# Patient Record
Sex: FEMALE | Race: WHITE | NOT HISPANIC OR LATINO | Employment: OTHER | URBAN - METROPOLITAN AREA
[De-identification: names, ages, dates, MRNs, and addresses within clinical notes are randomized per-mention and may not be internally consistent; named-entity substitution may affect disease eponyms.]

---

## 2017-01-25 ENCOUNTER — HOSPITAL ENCOUNTER (OUTPATIENT)
Dept: RADIOLOGY | Facility: MEDICAL CENTER | Age: 82
End: 2017-01-25
Attending: INTERNAL MEDICINE
Payer: MEDICARE

## 2017-01-25 DIAGNOSIS — I15.9 SECONDARY HYPERTENSION: ICD-10-CM

## 2017-01-25 DIAGNOSIS — I48.92 ATRIAL FLUTTER BY ELECTROCARDIOGRAM (HCC): ICD-10-CM

## 2017-01-25 DIAGNOSIS — E78.5 HYPERLIPIDEMIA, UNSPECIFIED HYPERLIPIDEMIA TYPE: ICD-10-CM

## 2017-02-01 ENCOUNTER — OFFICE VISIT (OUTPATIENT)
Dept: MEDICAL GROUP | Facility: MEDICAL CENTER | Age: 82
End: 2017-02-01
Payer: MEDICARE

## 2017-02-01 VITALS
WEIGHT: 123 LBS | DIASTOLIC BLOOD PRESSURE: 80 MMHG | SYSTOLIC BLOOD PRESSURE: 146 MMHG | HEART RATE: 59 BPM | BODY MASS INDEX: 24.8 KG/M2 | TEMPERATURE: 98.1 F | RESPIRATION RATE: 16 BRPM | OXYGEN SATURATION: 96 % | HEIGHT: 59 IN

## 2017-02-01 DIAGNOSIS — E11.9 CONTROLLED TYPE 2 DIABETES MELLITUS WITHOUT COMPLICATION, UNSPECIFIED LONG TERM INSULIN USE STATUS: ICD-10-CM

## 2017-02-01 DIAGNOSIS — N18.30 CKD (CHRONIC KIDNEY DISEASE) STAGE 3, GFR 30-59 ML/MIN (HCC): ICD-10-CM

## 2017-02-01 DIAGNOSIS — I10 HTN (HYPERTENSION), BENIGN: ICD-10-CM

## 2017-02-01 DIAGNOSIS — I50.22 CHRONIC SYSTOLIC CONGESTIVE HEART FAILURE (HCC): ICD-10-CM

## 2017-02-01 DIAGNOSIS — I48.0 PAROXYSMAL ATRIAL FIBRILLATION (HCC): ICD-10-CM

## 2017-02-01 DIAGNOSIS — E78.5 HYPERLIPIDEMIA, UNSPECIFIED HYPERLIPIDEMIA TYPE: ICD-10-CM

## 2017-02-01 LAB
HBA1C MFR BLD: 7 % (ref ?–5.8)
INT CON NEG: NORMAL
INT CON POS: NORMAL

## 2017-02-01 PROCEDURE — 1101F PT FALLS ASSESS-DOCD LE1/YR: CPT | Performed by: FAMILY MEDICINE

## 2017-02-01 PROCEDURE — 99214 OFFICE O/P EST MOD 30 MIN: CPT | Performed by: FAMILY MEDICINE

## 2017-02-01 PROCEDURE — G8484 FLU IMMUNIZE NO ADMIN: HCPCS | Performed by: FAMILY MEDICINE

## 2017-02-01 PROCEDURE — 4040F PNEUMOC VAC/ADMIN/RCVD: CPT | Performed by: FAMILY MEDICINE

## 2017-02-01 PROCEDURE — G8420 CALC BMI NORM PARAMETERS: HCPCS | Performed by: FAMILY MEDICINE

## 2017-02-01 PROCEDURE — 1036F TOBACCO NON-USER: CPT | Performed by: FAMILY MEDICINE

## 2017-02-01 PROCEDURE — G8432 DEP SCR NOT DOC, RNG: HCPCS | Performed by: FAMILY MEDICINE

## 2017-02-01 PROCEDURE — 83036 HEMOGLOBIN GLYCOSYLATED A1C: CPT | Performed by: FAMILY MEDICINE

## 2017-02-01 PROCEDURE — G8598 ASA/ANTIPLAT THER USED: HCPCS | Performed by: FAMILY MEDICINE

## 2017-02-01 ASSESSMENT — PATIENT HEALTH QUESTIONNAIRE - PHQ9: CLINICAL INTERPRETATION OF PHQ2 SCORE: 0

## 2017-02-01 NOTE — PROGRESS NOTES
CC: Regular follow up .    HPI:   Ana presents today for regular follow up.    Type 2 diabetes mellitus , has not been adequately controlled. A1C is 7.0. Has been asymptomatic.She is currently on Lantus 10 units in PM, and Novolog 6 units 3 times daily, patient has been having multiple hypoglycemic episodes in the past 3 month.    Hypertension, BP has been adequately controlled on current medication. Denies headache, chest pain, and SOB.She ius on Amlodipine 5 mg, carvedilol 25 mg bid. No side effects    Chronic kidney disease) stage 3, her last eGFR was 38. She has been asymptomatic.Advised to void nephrotoxic medication. Will continue watch kidney functions.    Chronic systolic congestive heart failure, her last echo was done recently showed EF of 35 %.Currently denies SOB, her leg swelling has improved a lot.  Currently she is on Lasix from 20 mg daily, and spironolactone 25 mg daily. Disucced with the patient that she might need evaluation for possible AICD if not responding to medical treatment. She follows up with cardiology Dr Simmons.    Paroxysmal atrial fibrillation , has been stable, and asymptomatic.has controlled rate and rhythm. Has been on BB, and on Eliquis     Hyperlipidemia, she has been tolerating the statin. Denies muscle pain LFTs has been normal, she is on Lipitor 80 mg daily.        Patient Active Problem List    Diagnosis Date Noted   • Physical deconditioning 03/24/2016     Priority: High   • Acute kidney injury (CMS-HCC) 03/24/2016     Priority: High   • Generalized weakness 03/19/2016     Priority: High   • Nephritis and nephropathy, not specified as acute or chronic, with other specified pathological lesion in kidney, in diseases classified elsewhere 09/08/2014     Priority: High   • Acute renal failure superimposed on stage 3 chronic kidney disease (CMS-HCC) 03/19/2016     Priority: Medium   • Hyponatremia 03/18/2016     Priority: Medium   • Aortic atherosclerosis (CMS-[facility])  04/28/2015     Priority: Medium   • CKD (chronic kidney disease) stage 3, GFR 30-59 ml/min 10/07/2014     Priority: Medium   • Diabetes mellitus (CMS-HCC) 09/08/2014     Priority: Medium   • Peripheral neuropathy (CMS-HCC) 09/10/2009     Priority: Medium   • HTN (hypertension), benign      Priority: Medium   • CHF (congestive heart failure) (CMS-HCC)      Priority: Medium   • DJD (degenerative joint disease)      Priority: Medium   • Hyperlipidemia      Priority: Low   • CAD (coronary artery disease)      Priority: Low   • HTN (hypertension) 11/23/2016   • DM type 2 (diabetes mellitus, type 2) (CMS-HCC) 11/23/2016   • Syncope 11/22/2016   • Acute systolic congestive heart failure, NYHA class 2 (CMS-HCC) 11/22/2016   • 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   • Aspiration into airway 07/18/2016   • Acute respiratory failure (CMS-HCC) 07/18/2016   • Lower urinary tract infectious disease 07/17/2016   • PAF (paroxysmal atrial fibrillation)-probably reason for Eliquis 07/17/2016   • Leukocytosis 07/16/2016   • Presbyesophagus 07/16/2016   • Pneumonia 07/15/2016   • Hypoglycemia associated with diabetes (CMS-HCC) 07/15/2016   • Medical home 03/25/2016   • Type 2 diabetes mellitus (CMS-HCC) 03/19/2016   • Encounter for long-term (current) use of insulin (CMS-HCC) 09/08/2014   • Renal failure 01/09/2013   • Diabetes    • Cystocele        Current Outpatient Prescriptions   Medication Sig Dispense Refill   • apixaban (ELIQUIS) 5mg Tab Take 1 Tab by mouth 2 Times a Day. 60 Tab 11   • carvedilol (COREG) 25 MG Tab Take 1 Tab by mouth 2 times a day, with meals. 60 Tab 2   • atorvastatin (LIPITOR) 80 MG tablet Take 1 Tab by mouth every morning. 90 Tab 1   • amlodipine (NORVASC) 5 MG Tab Take 1 Tab by mouth every day. 90 Tab 1   • spironolactone (ALDACTONE) 25 MG Tab Take 1 Tab by mouth every day. 30 Tab 3   • multivitamin (THERAGRAN) Tab Take 1 Tab by mouth every day. 30 Tab 1   • furosemide (LASIX) 20 MG Tab Take 1 Tab by mouth 2 times a  "day. 60 Tab 3   • insulin glargine (LANTUS) 100 UNIT/ML Solution Inject 20 Units as instructed every evening.     • insulin aspart (NOVOLOG) 100 UNIT/ML Solution Inject 5 Units as instructed 3 times a day before meals.     • docusate sodium (COLACE) 100 MG Cap Take 100 mg by mouth 2 times a day as needed for Constipation. Take 1 capsule by mouth twice daily as needed for constipation for up to 15 days.       No current facility-administered medications for this visit.         Allergies as of 02/01/2017 - Kirill as Reviewed 02/01/2017   Allergen Reaction Noted   • Tetracaine hcl [tetcaine]  10/29/2014   • Vancomycin  10/29/2014   • Vigamox [moxifloxacin]  10/29/2014        ROS: Denies any chest pain, Shortness of breath, Changes bowel or bladder, Lower extremity edema.    Physical Exam:  /80 mmHg  Pulse 59  Temp(Src) 36.7 °C (98.1 °F)  Resp 16  Ht 1.499 m (4' 11.02\")  Wt 55.792 kg (123 lb)  BMI 24.83 kg/m2  SpO2 96%  Gen.: Well-developed, well-nourished, no apparent distress,pleasant and cooperative with the examination  Skin:  Warm and dry with good turgor. No rashes or suspicious lesions in visible areas  HEENT:Sinuses nontender with palpation, TMs clear, nares patent with pink mucosa and clear rhinorrhea,no septal deviation ,polyps or lesions. lips without lesions, oropharynx clear.  Neck: Trachea midline,no masses or adenopathy. No JVD.  Cor: Regular rate and rhythm without murmur, gallop or rub.  Lungs: Respirations unlabored.Clear to auscultation with equal breath sounds bilaterally. No wheezes, rhonchi.  Extremities: No cyanosis, no clubbing , bilateral leg edema.          Assessment and Plan.   81 y.o. female     1.Type 2 diabetes mellitus without complication  Adequately controlled. A1C is 7.  Continue on Lantus 10 units in PM, patient has h/o multiple hypoglycemic episodes so will stop Novolog for now. Patient advised to check BS 3 times daily for a month, and RTC in 1 month for " reevaluation.    - POCT A1C    3. HTN (hypertension), benign  Has been adequately controlled on current medication. Denies headache, chest pain, and SOB.  Continue on Amlodipine 5 mg, carvedilol 25 mg bid    4. CKD (chronic kidney disease) stage 3, GFR 30-59 ml/min  Last eGFR was 38. Asymptomatic.  Could be because of her CHF.  Will continue watch.    5. Chronic systolic congestive heart failure  Last echo was done recently showed EF of 35 %.  Currently she is on medical treatment.  Continue Lasix from 20 mg daily, and spironolactone 25 mg daily  Needs evaluation for possible AICD if not responding to medical treatment.  RTC in a month for reevaluation.  She follows up with cardiology Dr Simmons.    5. Paroxysmal atrial fibrillation (CMS-HCC)  Stable, asymptomatic.Controlled rate and rhythm.   Continue  on Eliquis     6. Hyperlipidemia, unspecified hyperlipidemia type  He has been tolerating the statin. Denies muscle pain LFTs has been normal  Continue on Lipitor 80 mg daily.

## 2017-02-01 NOTE — MR AVS SNAPSHOT
"        Ana Uriostegui Melissa   2017 11:20 AM   Office Visit   MRN: [id number]    Department:  [address] [place] [address] Group   Dept Phone:  [phone]    Description:  Female : 1935   Provider:  Rich Coffey M.D.           Reason for Visit     Follow-Up routine f/u       Allergies as of 2017     Allergen Noted Reactions    Tetracaine Hcl [Tetcaine] 10/29/2014       Vancomycin 10/29/2014       Vigamox [Moxifloxacin] 10/29/2014         You were diagnosed with     Controlled type 2 diabetes mellitus without complication, unspecified long term insulin use status (CMS-HCC)   [6461497]       HTN (hypertension), benign   [606194]       Hyperlipidemia, unspecified hyperlipidemia type   [6793445]       CKD (chronic kidney disease) stage 3, GFR 30-59 ml/min   [379191]       Chronic systolic congestive heart failure (CMS-HCC)   [372398]       Paroxysmal atrial fibrillation (CMS-HCC)   [514905]         Vital Signs     Blood Pressure Pulse Temperature Respirations Height Weight    146/80 mmHg 59 36.7 °C (98.1 °F) 16 1.499 m (4' 11.02\") 55.792 kg (123 lb)    Body Mass Index Oxygen Saturation Smoking Status             24.83 kg/m2 96% Never Smoker          Basic Information     Date Of Birth Sex Race Ethnicity Preferred Language    1935 Female White Non- English      Your appointments     2017  3:15 PM   ECHO with ECHO [facility] EXAM 10   ECHOCARDIOLOGY [facility] ([facility])    [address] [facility] [address]   [phone]           No prep            2017 10:15 AM   NM CARDIAC STRESS TEST (30) with [facility] NM DSPECT 2   [facility] ([facility])    [address] [facility] [id number] [phone]           NPO for 4 hours prior to scan.  No caffeine for 24 hours prior to test (decaf, coffee, cola, tea, chocolate, Excedrin, and Anacin).  No Viagra or other ED meds for 48 hours.  Warn patient of length of test and to bring list of meds.      "    Feb 28, 2017  1:40 PM   Established Patient with Rich Coffey M.D.   [facility] [address]    [address]  [facility] [address]  [place] [address] [phone]           You will be receiving a confirmation call a few days before your appointment from our automated call confirmation system.              Problem List              ICD-10-CM Priority Class Noted - Resolved    HTN (hypertension), benign I10 Medium  Unknown - Present    Diabetes    Unknown - Present    Hyperlipidemia E78.5 Low  Unknown - Present    CAD (coronary artery disease) I25.10 Low  Unknown - Present    CHF (congestive heart failure) (CMS-HCC) I50.9 Medium  Unknown - Present    DJD (degenerative joint disease) M19.90 Medium  Unknown - Present    Cystocele N81.10   Unknown - Present    Peripheral neuropathy (CMS-HCC) G62.9 Medium  9/10/2009 - Present    Renal failure N19   1/9/2013 - Present    Diabetes mellitus (CMS-HCC) E11.9 Medium  9/8/2014 - Present    Encounter for long-term (current) use of insulin (CMS-HCC) Z79.4   9/8/2014 - Present    Nephritis and nephropathy, not specified as acute or chronic, with other specified pathological lesion in kidney, in diseases classified elsewhere N05.8 High  9/8/2014 - Present    CKD (chronic kidney disease) stage 3, GFR 30-59 ml/min N18.3 Medium  10/7/2014 - Present    Aortic atherosclerosis (CMS-HCC) I70.0 Medium  4/28/2015 - Present    Hyponatremia E87.1 Medium  3/18/2016 - Present    Generalized weakness R53.1 High  3/19/2016 - Present    Acute renal failure superimposed on stage 3 chronic kidney disease (CMS-HCC) N17.9, N18.3 Medium  3/19/2016 - Present    Type 2 diabetes mellitus (CMS-HCC) E11.9   3/19/2016 - Present    Physical deconditioning R53.81 High  3/24/2016 - Present    Acute kidney injury (CMS-HCC) N17.9 High  3/24/2016 - Present    Medical home Z78.9   3/25/2016 - Present    Pneumonia J18.9   7/15/2016 - Present    Hypoglycemia associated with diabetes  (CMS-HCC) E11.649   7/15/2016 - Present    Leukocytosis D72.829   7/16/2016 - Present    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   7/16/2016 - Present    Lower urinary tract infectious disease N39.0   7/17/2016 - Present    PAF (paroxysmal atrial fibrillation)-probably reason for Eliquis I48.0   7/17/2016 - Present    Aspiration into airway T17.908A   7/18/2016 - Present    Acute respiratory failure (CMS-HCC) J96.00   7/18/2016 - Present    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   7/19/2016 - Present    Syncope R55   11/22/2016 - Present    Acute systolic congestive heart failure, NYHA class 2 (CMS-HCC) I50.21   11/22/2016 - Present    HTN (hypertension) I10   11/23/2016 - Present    DM type 2 (diabetes mellitus, type 2) (CMS-HCC) E11.9   11/23/2016 - Present      Health Maintenance        Date Due Completion Dates    IMM DTaP/Tdap/Td Vaccine (1 - Tdap) 6/22/1956 ---    PAP SMEAR 6/22/1958 ---    COLONOSCOPY 6/22/1987 ---    IMM ZOSTER VACCINE 6/22/1997 ---    BONE DENSITY 1/23/2013 1/23/2008, 1/23/2008    URINE ACR / MICROALBUMIN 11/5/2015 11/5/2014, 7/1/2010    MAMMOGRAM 2/6/2016 2/6/2015, 2/3/2015, 2/3/2015, 2/3/2015, 9/21/2010, 9/24/2008, 8/30/2006, 8/24/2005, 8/23/2004    RETINAL SCREENING 3/17/2016 3/17/2015 (Done), 4/10/2014    Override on 3/17/2015: Done    IMM INFLUENZA (1) 9/1/2016 10/1/2015, 10/3/2014    DIABETES MONOFILAMENT / LE EXAM 9/1/2016 9/1/2015 (Done), 3/2/2015 (N/S), 3/2/2015 (Done), 3/2/2015 (N/S), 3/2/2015 (N/S), 3/2/2015 (Done), 9/8/2014    Override on 9/1/2015: Done    Override on 3/2/2015: (N/S)    Override on 3/2/2015: Done    Override on 3/2/2015: (N/S)    Override on 3/2/2015: (N/S)    Override on 3/2/2015: Done    A1C SCREENING 10/19/2016 4/19/2016, 2/3/2016, 8/3/2015, 3/2/2015, 10/29/2014, 9/8/2014, 1/29/2014    FASTING LIPID PROFILE 11/24/2017 11/24/2016, 8/10/2015, 11/5/2014, 6/2/2014, 4/4/2014, 7/1/2010    SERUM CREATININE 12/9/2017 12/9/2016, 11/26/2016, 11/25/2016, 11/24/2016, 11/22/2016, 7/20/2016, 7/18/2016,  7/17/2016, 7/16/2016, 7/15/2016, 5/16/2016, 4/25/2016, 4/18/2016, 4/14/2016, 4/11/2016, 4/9/2016, 4/5/2016, 4/4/2016, 4/3/2016, 4/2/2016, 4/1/2016, 3/31/2016, 3/30/2016, 3/29/2016, 3/25/2016, 3/24/2016, 3/23/2016, 3/22/2016, 3/21/2016, 3/20/2016, 3/19/2016, 3/18/2016, 3/17/2016, 2/22/2016, 8/10/2015, 2/17/2015, 2/17/2015, 12/15/2014, 11/19/2014, 11/5/2014, 10/11/2014, 10/10/2014, 10/9/2014, 10/8/2014, 10/7/2014, 10/6/2014, 4/29/2014, 4/4/2014, 1/29/2014, 7/1/2010            Results     POCT Hemoglobin A1C      Component    Glycohemoglobin    7.0    Internal Control Negative    Internal Control Positive                        Current Immunizations     13-VALENT PCV PREVNAR 9/16/2015    Influenza TIV (IM) 10/1/2015, 10/3/2014    Pneumococcal polysaccharide vaccine (PPSV-23) 10/29/2014, 9/6/2009    Tuberculin Skin Test 6/26/2016, 4/19/2016 10:57 AM      Below and/or attached are the medications your provider expects you to take. Review all of your home medications and newly ordered medications with your provider and/or pharmacist. Follow medication instructions as directed by your provider and/or pharmacist. Please keep your medication list with you and share with your provider. Update the information when medications are discontinued, doses are changed, or new medications (including over-the-counter products) are added; and carry medication information at all times in the event of emergency situations     Allergies:  TETRACAINE HCL - (reactions not documented)     VANCOMYCIN - (reactions not documented)     VIGAMOX - (reactions not documented)               Medications  Valid as of: February 01, 2017 - 11:55 AM    Generic Name Brand Name Tablet Size Instructions for use    AmLODIPine Besylate (Tab) NORVASC 5 MG Take 1 Tab by mouth every day.        Apixaban (Tab) ELIQUIS 5mg Take 1 Tab by mouth 2 Times a Day.        Atorvastatin Calcium (Tab) LIPITOR 80 MG Take 1 Tab by mouth every morning.        Carvedilol (Tab)  COREG 25 MG Take 1 Tab by mouth 2 times a day, with meals.        Docusate Sodium (Cap) COLACE 100 MG Take 100 mg by mouth 2 times a day as needed for Constipation. Take 1 capsule by mouth twice daily as needed for constipation for up to 15 days.        Furosemide (Tab) LASIX 20 MG Take 1 Tab by mouth 2 times a day.        Insulin Aspart (Solution) NOVOLOG 100 UNIT/ML Inject 5 Units as instructed 3 times a day before meals.        Insulin Glargine (Solution) LANTUS 100 UNIT/ML Inject 20 Units as instructed every evening.        Multiple Vitamin (Tab) THERAGRAN  Take 1 Tab by mouth every day.        Spironolactone (Tab) ALDACTONE 25 MG Take 1 Tab by mouth every day.        .                 Medicines prescribed today were sent to:     [facility] - [place], [place] - [address]    [address] [facility] [address]    Phone: [phone] Fax: [phone]    Open 24 Hours?: No    [facility], [place] - [address] [address]    Phone: [phone] Fax: [phone]    Open 24 Hours?: No    [facility] [address] [place], [place] - [address] [facility]    [address] [facility] [address]    Phone: [phone] Fax: [phone]    Open 24 Hours?: No      Medication refill instructions:       If your prescription bottle indicates you have medication refills left, it is not necessary to call your provider’s office. Please contact your pharmacy and they will refill your medication.    If your prescription bottle indicates you do not have any refills left, you may request refills at any time through one of the following ways: The online [employer] system (except Urgent Care), by calling your provider’s office, or by asking your pharmacy to contact your provider’s office with a refill request. Medication refills are processed only during regular business hours and may not be available until the next business day. Your provider may request additional  information or to have a follow-up visit with you prior to refilling your medication.   *Please Note: Medication refills are assigned a new Rx number when refilled electronically. Your pharmacy may indicate that no refills were authorized even though a new prescription for the same medication is available at the pharmacy. Please request the medicine by name with the pharmacy before contacting your provider for a refill.        Other Notes About Your Plan     Patient is enrolled in [facility] with Dr. Coffey.    8/3/16--Chart reviewed for case management intervention.  Not appropriate for chronic care management at this time.           MyChart Status: Patient Declined

## 2017-02-06 DIAGNOSIS — I10 ESSENTIAL HYPERTENSION: ICD-10-CM

## 2017-02-06 RX ORDER — AMLODIPINE BESYLATE 5 MG/1
5 TABLET ORAL DAILY
Qty: 90 TAB | Refills: 1 | Status: SHIPPED | OUTPATIENT
Start: 2017-02-06 | End: 2017-02-07 | Stop reason: SDUPTHER

## 2017-02-07 DIAGNOSIS — I10 ESSENTIAL HYPERTENSION: ICD-10-CM

## 2017-02-07 RX ORDER — AMLODIPINE BESYLATE 5 MG/1
5 TABLET ORAL DAILY
Qty: 90 TAB | Refills: 1 | Status: SHIPPED | OUTPATIENT
Start: 2017-02-07 | End: 2017-07-25

## 2017-02-08 ENCOUNTER — HOSPITAL ENCOUNTER (OUTPATIENT)
Dept: CARDIOLOGY | Facility: MEDICAL CENTER | Age: 82
End: 2017-02-08
Attending: FAMILY MEDICINE
Payer: MEDICARE

## 2017-02-08 ENCOUNTER — HOSPITAL ENCOUNTER (OUTPATIENT)
Dept: LAB | Facility: MEDICAL CENTER | Age: 82
End: 2017-02-08
Attending: INTERNAL MEDICINE
Payer: MEDICARE

## 2017-02-08 DIAGNOSIS — I48.92 ATRIAL FLUTTER BY ELECTROCARDIOGRAM (HCC): ICD-10-CM

## 2017-02-08 DIAGNOSIS — E78.5 HYPERLIPIDEMIA, UNSPECIFIED HYPERLIPIDEMIA TYPE: ICD-10-CM

## 2017-02-08 DIAGNOSIS — I15.9 SECONDARY HYPERTENSION: ICD-10-CM

## 2017-02-08 LAB
ANION GAP SERPL CALC-SCNC: 9 MMOL/L (ref 0–11.9)
BASOPHILS # BLD AUTO: 0.04 K/UL (ref 0–0.12)
BASOPHILS NFR BLD AUTO: 0.6 % (ref 0–1.8)
BNP SERPL-MCNC: 248 PG/ML (ref 0–100)
BUN SERPL-MCNC: 61 MG/DL (ref 8–22)
CALCIUM SERPL-MCNC: 10.9 MG/DL (ref 8.5–10.5)
CHLORIDE SERPL-SCNC: 100 MMOL/L (ref 96–112)
CHOLEST SERPL-MCNC: 125 MG/DL (ref 100–199)
CO2 SERPL-SCNC: 29 MMOL/L (ref 20–33)
CREAT SERPL-MCNC: 1.78 MG/DL (ref 0.5–1.4)
EOSINOPHIL # BLD: 0.13 K/UL (ref 0–0.51)
EOSINOPHIL NFR BLD AUTO: 1.9 % (ref 0–6.9)
ERYTHROCYTE [DISTWIDTH] IN BLOOD BY AUTOMATED COUNT: 48 FL (ref 35.9–50)
GLUCOSE SERPL-MCNC: 133 MG/DL (ref 65–99)
HCT VFR BLD AUTO: 40.2 % (ref 37–47)
HDLC SERPL-MCNC: 48 MG/DL
HGB BLD-MCNC: 12.9 G/DL (ref 12–16)
IMM GRANULOCYTES # BLD AUTO: 0.02 K/UL (ref 0–0.11)
IMM GRANULOCYTES NFR BLD AUTO: 0.3 % (ref 0–0.9)
LDLC SERPL CALC-MCNC: 57 MG/DL
LYMPHOCYTES # BLD: 1.15 K/UL (ref 1–4.8)
LYMPHOCYTES NFR BLD AUTO: 17.2 % (ref 22–41)
MCH RBC QN AUTO: 31.5 PG (ref 27–33)
MCHC RBC AUTO-ENTMCNC: 32.1 G/DL (ref 33.6–35)
MCV RBC AUTO: 98.3 FL (ref 81.4–97.8)
MONOCYTES # BLD: 0.61 K/UL (ref 0–0.85)
MONOCYTES NFR BLD AUTO: 9.1 % (ref 0–13.4)
NEUTROPHILS # BLD: 4.75 K/UL (ref 2–7.15)
NEUTROPHILS NFR BLD AUTO: 70.9 % (ref 44–72)
NRBC # BLD AUTO: 0 K/UL
NRBC BLD-RTO: 0 /100 WBC
PLATELET # BLD AUTO: 209 K/UL (ref 164–446)
PMV BLD AUTO: 12.2 FL (ref 9–12.9)
POTASSIUM SERPL-SCNC: 4 MMOL/L (ref 3.6–5.5)
RBC # BLD AUTO: 4.09 M/UL (ref 4.2–5.4)
SODIUM SERPL-SCNC: 138 MMOL/L (ref 135–145)
TRIGL SERPL-MCNC: 101 MG/DL (ref 0–149)
WBC # BLD AUTO: 6.7 K/UL (ref 4.8–10.8)

## 2017-02-08 PROCEDURE — 93306 TTE W/DOPPLER COMPLETE: CPT

## 2017-02-10 LAB
LV EJECT FRACT MOD 2C 99903: 46.52
LV EJECT FRACT MOD 4C 99902: 44.01
LV EJECT FRACT MOD BP 99901: 47.4

## 2017-02-28 ENCOUNTER — APPOINTMENT (OUTPATIENT)
Dept: MEDICAL GROUP | Facility: MEDICAL CENTER | Age: 82
End: 2017-02-28
Payer: MEDICARE

## 2017-03-20 ENCOUNTER — OFFICE VISIT (OUTPATIENT)
Dept: MEDICAL GROUP | Facility: MEDICAL CENTER | Age: 82
End: 2017-03-20
Payer: MEDICARE

## 2017-03-20 VITALS
OXYGEN SATURATION: 92 % | DIASTOLIC BLOOD PRESSURE: 60 MMHG | BODY MASS INDEX: 24.8 KG/M2 | HEIGHT: 59 IN | SYSTOLIC BLOOD PRESSURE: 120 MMHG | HEART RATE: 64 BPM | WEIGHT: 123 LBS | TEMPERATURE: 98.2 F

## 2017-03-20 DIAGNOSIS — I10 HTN (HYPERTENSION), BENIGN: ICD-10-CM

## 2017-03-20 DIAGNOSIS — Z79.4 TYPE 2 DIABETES MELLITUS WITHOUT COMPLICATION, WITH LONG-TERM CURRENT USE OF INSULIN (HCC): ICD-10-CM

## 2017-03-20 DIAGNOSIS — E78.5 HYPERLIPIDEMIA, UNSPECIFIED HYPERLIPIDEMIA TYPE: ICD-10-CM

## 2017-03-20 DIAGNOSIS — I48.0 PAF (PAROXYSMAL ATRIAL FIBRILLATION) (HCC): ICD-10-CM

## 2017-03-20 DIAGNOSIS — I50.22 CHRONIC SYSTOLIC CONGESTIVE HEART FAILURE (HCC): ICD-10-CM

## 2017-03-20 DIAGNOSIS — N18.30 CKD (CHRONIC KIDNEY DISEASE) STAGE 3, GFR 30-59 ML/MIN (HCC): ICD-10-CM

## 2017-03-20 DIAGNOSIS — E11.9 TYPE 2 DIABETES MELLITUS WITHOUT COMPLICATION, WITH LONG-TERM CURRENT USE OF INSULIN (HCC): ICD-10-CM

## 2017-03-20 PROCEDURE — 99215 OFFICE O/P EST HI 40 MIN: CPT | Mod: 25 | Performed by: NURSE PRACTITIONER

## 2017-03-20 PROCEDURE — 1101F PT FALLS ASSESS-DOCD LE1/YR: CPT | Performed by: NURSE PRACTITIONER

## 2017-03-20 PROCEDURE — G8598 ASA/ANTIPLAT THER USED: HCPCS | Performed by: NURSE PRACTITIONER

## 2017-03-20 PROCEDURE — 4040F PNEUMOC VAC/ADMIN/RCVD: CPT | Performed by: NURSE PRACTITIONER

## 2017-03-20 PROCEDURE — G8420 CALC BMI NORM PARAMETERS: HCPCS | Performed by: NURSE PRACTITIONER

## 2017-03-20 PROCEDURE — 1036F TOBACCO NON-USER: CPT | Performed by: NURSE PRACTITIONER

## 2017-03-20 PROCEDURE — G8484 FLU IMMUNIZE NO ADMIN: HCPCS | Performed by: NURSE PRACTITIONER

## 2017-03-20 RX ORDER — FUROSEMIDE 40 MG/1
60 TABLET ORAL DAILY
Qty: 30 TAB | Status: SHIPPED | DISCHARGE
Start: 2017-03-20 | End: 2020-09-10

## 2017-03-20 NOTE — PROGRESS NOTES
RN-PHYLLISE Note  Type 2 Diabetes  Subjective:     Health changes since last visit/interval Hx: General Health has been good.  States not hungry but eating 3 meals.      Medications (including changes made today)  Current Outpatient Prescriptions   Medication Sig Dispense Refill   • amlodipine (NORVASC) 5 MG Tab Take 1 Tab by mouth every day. 90 Tab 1   • apixaban (ELIQUIS) 5mg Tab Take 1 Tab by mouth 2 Times a Day. 60 Tab 11   • carvedilol (COREG) 25 MG Tab Take 1 Tab by mouth 2 times a day, with meals. 60 Tab 2   • atorvastatin (LIPITOR) 80 MG tablet Take 1 Tab by mouth every morning. 90 Tab 1   • spironolactone (ALDACTONE) 25 MG Tab Take 1 Tab by mouth every day. 30 Tab 3   • multivitamin (THERAGRAN) Tab Take 1 Tab by mouth every day. 30 Tab 1   • furosemide (LASIX) 20 MG Tab Take 1 Tab by mouth 2 times a day. 60 Tab 3   • insulin glargine (LANTUS) 100 UNIT/ML Solution Inject 10 Units as instructed every evening.     • insulin aspart (NOVOLOG) 100 UNIT/ML Solution Inject 5 Units as instructed 3 times a day before meals.     • docusate sodium (COLACE) 100 MG Cap Take 100 mg by mouth 2 times a day as needed for Constipation. Take 1 capsule by mouth twice daily as needed for constipation for up to 15 days.       No current facility-administered medications for this visit.         Taking daily ASA: No, on Eliquis  Taking above medications as prescribed: Yes   Patient Denies side effects of medication.    Exercise: Up with cane walking  Diet: Breakfast us muffin or oatmeal.  Lunch is soup.  Dinner is meat, vegetable, and starch.    Health Maintenance:   Health Maintenance Topics with due status: Overdue       Topic Date Due    Annual Wellness Visit 1935    IMM DTaP/Tdap/Td Vaccine 06/22/1956    PAP SMEAR 06/22/1958    COLONOSCOPY 06/22/1987    IMM ZOSTER VACCINE 06/22/1997    BONE DENSITY 01/23/2013    URINE ACR / MICROALBUMIN 11/05/2015    MAMMOGRAM 02/06/2016    RETINAL SCREENING 03/17/2016    IMM INFLUENZA  09/01/2016    DIABETES MONOFILAMENT / LE EXAM 09/01/2016         DM:   Last A1c:   Lab Results   Component Value Date/Time    GLYCOHEMOGLOBIN 7.0 02/01/2017 11:38 AM      A1c goal: < 7    Glucose monitoring frequency: Testing 3 times daily.  trend:   Hypoglycemic episodes: no, not since stopping the Novolog     Last Retinal Exam: Due for her annual eye exam  Daily Foot Exam: yes  Routine Dental Exams: no    Lab Results   Component Value Date/Time    MICRO ALB CREAT RATIO 145* 11/05/2014 10:04 AM    MICROALBUMIN, URINE RANDOM 18.7 11/05/2014 10:04 AM        ACR Albumin/Creatinine Ratio goal <30     Diabetic complications: cardiovascular disease    HTN:   Blood pressure goal <140/<80 .   Currently Rx ACE/ARB: No    Dyslipidemia:    Lab Results   Component Value Date/Time    CHOLESTEROL, 02/08/2017 11:31 AM    LDL 57 02/08/2017 11:31 AM    HDL 48 02/08/2017 11:31 AM    TRIGLYCERIDES 101 02/08/2017 11:31 AM       Lab Results   Component Value Date/Time    SODIUM 138 02/08/2017 11:31 AM    POTASSIUM 4.0 02/08/2017 11:31 AM    CHLORIDE 100 02/08/2017 11:31 AM    CO2 29 02/08/2017 11:31 AM    GLUCOSE 133* 02/08/2017 11:31 AM    BUN 61* 02/08/2017 11:31 AM    CREATININE 1.78* 02/08/2017 11:31 AM     Lab Results   Component Value Date/Time    ALKALINE PHOSPHATASE 72 11/22/2016 10:28 PM    AST(SGOT) 22 11/22/2016 10:28 PM    ALT(SGPT) 14 11/22/2016 10:28 PM    TOTAL BILIRUBIN 0.5 11/22/2016 10:28 PM        Currently Rx Statin: Yes      She  reports that she has never smoked. She has never used smokeless tobacco.    Objective:     Exam:  Monofilament: done  Monofilament testing with a 10 gram force: sensation intact: intact bilaterally  Visual Inspection: Feet without maceration, ulcers, fissures.  Feet dry and mild edema.  Pedal pulses: intact bilaterally      Plan:     - Diabetic diet discussed in detail-plate method.  - Home glucose monitoring.  - She will test and log.    - She will walk for 20-30 minutes  daily.  - Discussed importance of immunizations and yearly eye exams.   - Encouraged patient to attend diabetes education program.   -Educational material distributed.   - Advised daily foot exams. Educated on signs of infection.       Recommended medication changes: States watching her diet and her blood sugars have stayed in the  range with just Lantus 10 units daily.  Denies any neuropathy pain at this time.

## 2017-03-20 NOTE — PROGRESS NOTES
Subjective:      Ana Torres is a 81 y.o. female who presents with Diabetes            Diabetes    Ana Torres is here today to be seen by myself and the diabetic nurse for diabetes and other chronic problems.      1. Type 2 diabetes mellitus without complication, with long-term current use of insulin ([facility])  Patient is currently only taking Lantus 10 units and stopped her short acting insulin. Despite this, her hemoglobin A1c is good at 7.0 for her age and comorbidities. She is not having hypoglycemia any longer. She brings with her home blood sugar readings which also are good. She denies any recent hypoglycemia and is alert and talkative.    2. CKD (chronic kidney disease) stage 3, GFR 30-59 ml/min  Patient has shown consistently low GFR readings and is currently on spironolactone and has no problems with urination. Blood pressure and diabetes is controlled.    3. HTN (hypertension), benign  Blood pressure looks good today on her amlodipine and carvedilol. She also takes spironolactone and Lasix.    4. Chronic systolic congestive heart failure (CMS-[facility])  Patient reports she saw her cardiologist not too long ago and her Lasix dosage was changed to 60 mg a day. She has lab work done on a regular basis and reports no increased lower extremity edema today, shortness of breath or chest pain.    5. Hyperlipidemia, unspecified hyperlipidemia type  Most recent lipid panel was good on her statin.    6. PAF (paroxysmal atrial fibrillation)-probably reason for Eliquis  Patient currently takes Eliquis and reports no palpitations, chest pain or bruising.    Social History   Substance Use Topics   • Smoking status: Never Smoker    • Smokeless tobacco: Never Used   • Alcohol Use: No     Current Outpatient Prescriptions   Medication Sig Dispense Refill   • furosemide (LASIX) 40 MG Tab Take 1.5 Tabs by mouth every day. 30 Tab    • amlodipine (NORVASC) 5 MG Tab Take 1 Tab by mouth every day. 90 Tab 1  "  • apixaban (ELIQUIS) 5mg Tab Take 1 Tab by mouth 2 Times a Day. 60 Tab 11   • carvedilol (COREG) 25 MG Tab Take 1 Tab by mouth 2 times a day, with meals. 60 Tab 2   • atorvastatin (LIPITOR) 80 MG tablet Take 1 Tab by mouth every morning. 90 Tab 1   • spironolactone (ALDACTONE) 25 MG Tab Take 1 Tab by mouth every day. 30 Tab 3   • multivitamin (THERAGRAN) Tab Take 1 Tab by mouth every day. 30 Tab 1   • insulin glargine (LANTUS) 100 UNIT/ML Solution Inject 10 Units as instructed every evening.     • docusate sodium (COLACE) 100 MG Cap Take 100 mg by mouth 2 times a day as needed for Constipation. Take 1 capsule by mouth twice daily as needed for constipation for up to 15 days.       No current facility-administered medications for this visit.   History reviewed. No pertinent family history.   Past Medical History   Diagnosis Date   • HTN (hypertension), benign    • Hyperlipidemia    • CAD (coronary artery disease)    • Cystocele    • PVD (peripheral vascular disease) (CMS-HCC)    • DJD (degenerative joint disease)    • CHF (congestive heart failure) (CMS-HCC)    • Unspecified urinary incontinence    • Urinary bladder disorder    • Diabetes      insulin   • CATARACT    • Renal disorder      \"watching my kidneys\"     • Myocardial infarct (CMS-HCC) 2000   • Medical home        Review of Systems   All other systems reviewed and are negative.         Objective:     /60 mmHg  Pulse 64  Temp(Src) 36.8 °C (98.2 °F)  Ht 1.499 m (4' 11\")  Wt 55.792 kg (123 lb)  BMI 24.83 kg/m2  SpO2 92%     Physical Exam   Constitutional: She is oriented to person, place, and time. She appears well-developed and well-nourished. No distress.   HENT:   Head: Normocephalic and atraumatic.   Right Ear: External ear normal.   Left Ear: External ear normal.   Nose: Nose normal.   Eyes: Right eye exhibits no discharge. Left eye exhibits no discharge.   Neck: Normal range of motion. Neck supple. No thyromegaly present.   Cardiovascular: " Normal rate and regular rhythm.  Exam reveals no gallop and no friction rub.    Murmur heard.  1+ bilateral lower extremity edema.   Pulmonary/Chest: Effort normal and breath sounds normal. She has no wheezes. She has no rales.   Musculoskeletal: She exhibits no edema or tenderness.   Neurological: She is alert and oriented to person, place, and time. She displays normal reflexes.   Skin: Skin is warm and dry. No rash noted. She is not diaphoretic.   Psychiatric: She has a normal mood and affect. Her behavior is normal. Judgment and thought content normal.   Nursing note and vitals reviewed.              Assessment/Plan:     1. Type 2 diabetes mellitus without complication, with long-term current use of insulin ([facility])  The diabetic nurse and myself discussed this case and we feel the patient is doing well with her current Lantus 10 units and no changes will be made. A diabetic retinal exam was done today as well as a monofilament exam by the RN. She will be following with , her PCP.   - Diabetic Monofilament LE Exam  - POCT Retinal Eye Exam    2. CKD (chronic kidney disease) stage 3, GFR 30-59 ml/min  GFR is continuing to be low and is most likely secondary to her age, hypertension and diabetes. She is not taking NSAIDs.    3. HTN (hypertension), benign  Blood pressure continues to be well controlled on her amlodipine, furosemide, carvedilol and spironolactone.    4. Chronic systolic congestive heart failure (CMS-[facility])  Patient's medication list updated and her cardiologist was added to her care teams. No evidence of heart failure in the office today.  - furosemide (LASIX) 40 MG Tab; Take 1.5 Tabs by mouth every day.  Dispense: 30 Tab    5. Hyperlipidemia, unspecified hyperlipidemia type  Cholesterol levels look very good from her last testing.    6. PAF (paroxysmal atrial fibrillation)-probably reason for Eliquis  Heart rhythm is normal in the office today and she continues on her anticoagulation and beta  blocker.

## 2017-03-20 NOTE — MR AVS SNAPSHOT
"        Ana Uriostegui Sabasjess   3/20/2017 11:00 AM   Office Visit   MRN: [id number]    Department:  [address] [place] [address]   Dept Phone:  [phone]    Description:  Female : 1935   Provider:  RENNY Boone; DIABETES RN           Reason for Visit     Diabetes           Allergies as of 3/20/2017     Allergen Noted Reactions    Tetracaine Hcl [Tetcaine] 10/29/2014       Vancomycin 10/29/2014       Vigamox [Moxifloxacin] 10/29/2014         You were diagnosed with     Type 2 diabetes mellitus without complication, with long-term current use of insulin (CMS-HCC)   [3330823]       CKD (chronic kidney disease) stage 3, GFR 30-59 ml/min   [022223]       HTN (hypertension), benign   [811109]       Chronic systolic congestive heart failure (CMS-HCC)   [916059]       Hyperlipidemia, unspecified hyperlipidemia type   [5633654]       PAF (paroxysmal atrial fibrillation) (CMS-HCC)   [612854]         Vital Signs     Blood Pressure Pulse Temperature Height Weight Body Mass Index    120/60 mmHg 64 36.8 °C (98.2 °F) 1.499 m (4' 11\") 55.792 kg (123 lb) 24.83 kg/m2    Oxygen Saturation Smoking Status                92% Never Smoker           Basic Information     Date Of Birth Sex Race Ethnicity Preferred Language    1935 Female White Non- English      Your appointments     2017 11:00 AM   Established Patient with Rich Coffey M.D.   [facility] [address]    [address] [place] [id number] [phone]           You will be receiving a confirmation call a few days before your appointment from our automated call confirmation system.              Problem List              ICD-10-CM Priority Class Noted - Resolved    HTN (hypertension), benign I10 Medium  Unknown - Present    Hyperlipidemia E78.5 Low  Unknown - Present    CAD (coronary artery disease) I25.10 Low  Unknown - Present    CHF (congestive heart failure) (CMS-HCC) I50.9 Medium  Unknown - " Present    DJD (degenerative joint disease) M19.90 Medium  Unknown - Present    Cystocele N81.10   Unknown - Present    Peripheral neuropathy (CMS-HCC) G62.9 Medium  9/10/2009 - Present    Renal failure N19   1/9/2013 - Present    Encounter for long-term (current) use of insulin (CMS-HCC) Z79.4   9/8/2014 - Present    Nephritis and nephropathy, not specified as acute or chronic, with other specified pathological lesion in kidney, in diseases classified elsewhere N05.8 High  9/8/2014 - Present    CKD (chronic kidney disease) stage 3, GFR 30-59 ml/min N18.3 Medium  10/7/2014 - Present    Aortic atherosclerosis (CMS-HCC) I70.0 Medium  4/28/2015 - Present    Hyponatremia E87.1 Medium  3/18/2016 - Present    Generalized weakness R53.1 High  3/19/2016 - Present    Acute renal failure superimposed on stage 3 chronic kidney disease (CMS-HCC) N17.9, N18.3 Medium  3/19/2016 - Present    Physical deconditioning R53.81 High  3/24/2016 - Present    Acute kidney injury (CMS-HCC) N17.9 High  3/24/2016 - Present    Medical home Z78.9   3/25/2016 - Present    Hypoglycemia associated with diabetes (CMS-HCC) E11.649   7/15/2016 - Present    Leukocytosis D72.829   7/16/2016 - Present    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   7/16/2016 - Present    Lower urinary tract infectious disease N39.0   7/17/2016 - Present    PAF (paroxysmal atrial fibrillation)-probably reason for Eliquis I48.0   7/17/2016 - Present    Aspiration into airway T17.908A   7/18/2016 - Present    Acute respiratory failure (CMS-HCC) J96.00   7/18/2016 - Present    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   7/19/2016 - Present    Syncope R55   11/22/2016 - Present    Acute systolic congestive heart failure, NYHA class 2 (CMS-HCC) I50.21   11/22/2016 - Present    HTN (hypertension) I10   11/23/2016 - Present    DM type 2 (diabetes mellitus, type 2) (CMS-HCC) E11.9   11/23/2016 - Present    Type 2 diabetes mellitus without complication, with long-term current use of insulin ([facility]) E11.9, Z79.4    3/20/2017 - Present      Health Maintenance        Date Due Completion Dates    IMM DTaP/Tdap/Td Vaccine (1 - Tdap) 6/22/1956 ---    PAP SMEAR 6/22/1958 ---    COLONOSCOPY 6/22/1987 ---    IMM ZOSTER VACCINE 6/22/1997 ---    BONE DENSITY 1/23/2013 1/23/2008, 1/23/2008    URINE ACR / MICROALBUMIN 11/5/2015 11/5/2014, 7/1/2010    MAMMOGRAM 2/6/2016 2/6/2015, 2/3/2015, 2/3/2015, 2/3/2015, 9/21/2010, 9/24/2008, 8/30/2006, 8/24/2005, 8/23/2004    RETINAL SCREENING 3/17/2016 3/17/2015 (Done), 4/10/2014    Override on 3/17/2015: Done    IMM INFLUENZA (1) 9/1/2016 10/1/2015, 10/3/2014    A1C SCREENING 8/1/2017 2/1/2017, 4/19/2016, 2/3/2016, 8/3/2015, 3/2/2015, 10/29/2014, 9/8/2014, 1/29/2014    FASTING LIPID PROFILE 2/8/2018 2/8/2017, 11/24/2016, 8/10/2015, 11/5/2014, 6/2/2014, 4/4/2014, 7/1/2010    SERUM CREATININE 2/8/2018 2/8/2017, 12/9/2016, 11/26/2016, 11/25/2016, 11/24/2016, 11/22/2016, 7/20/2016, 7/18/2016, 7/17/2016, 7/16/2016, 7/15/2016, 5/16/2016, 4/25/2016, 4/18/2016, 4/14/2016, 4/11/2016, 4/9/2016, 4/5/2016, 4/4/2016, 4/3/2016, 4/2/2016, 4/1/2016, 3/31/2016, 3/30/2016, 3/29/2016, 3/25/2016, 3/24/2016, 3/23/2016, 3/22/2016, 3/21/2016, 3/20/2016, 3/19/2016, 3/18/2016, 3/17/2016, 2/22/2016, 8/10/2015, 2/17/2015, 2/17/2015, 12/15/2014, 11/19/2014, 11/5/2014, 10/11/2014, 10/10/2014, 10/9/2014, 10/8/2014, 10/7/2014, 10/6/2014, 4/29/2014, 4/4/2014, 1/29/2014, 7/1/2010    DIABETES MONOFILAMENT / LE EXAM 3/20/2018 3/20/2017, 9/1/2015 (Done), 3/2/2015 (N/S), 3/2/2015 (Done), 3/2/2015 (N/S), 3/2/2015 (N/S), 3/2/2015 (Done), 9/8/2014    Override on 9/1/2015: Done    Override on 3/2/2015: (N/S)    Override on 3/2/2015: Done    Override on 3/2/2015: (N/S)    Override on 3/2/2015: (N/S)    Override on 3/2/2015: Done            Current Immunizations     13-VALENT PCV PREVNAR 9/16/2015    Influenza TIV (IM) 10/1/2015, 10/3/2014    Pneumococcal polysaccharide vaccine (PPSV-23) 10/29/2014, 9/6/2009    Tuberculin Skin Test  6/26/2016, 4/19/2016 10:57 AM      Below and/or attached are the medications your provider expects you to take. Review all of your home medications and newly ordered medications with your provider and/or pharmacist. Follow medication instructions as directed by your provider and/or pharmacist. Please keep your medication list with you and share with your provider. Update the information when medications are discontinued, doses are changed, or new medications (including over-the-counter products) are added; and carry medication information at all times in the event of emergency situations     Allergies:  TETRACAINE HCL - (reactions not documented)     VANCOMYCIN - (reactions not documented)     VIGAMOX - (reactions not documented)               Medications  Valid as of: March 20, 2017 - 11:43 AM    Generic Name Brand Name Tablet Size Instructions for use    AmLODIPine Besylate (Tab) NORVASC 5 MG Take 1 Tab by mouth every day.        Apixaban (Tab) ELIQUIS 5mg Take 1 Tab by mouth 2 Times a Day.        Atorvastatin Calcium (Tab) LIPITOR 80 MG Take 1 Tab by mouth every morning.        Carvedilol (Tab) COREG 25 MG Take 1 Tab by mouth 2 times a day, with meals.        Docusate Sodium (Cap) COLACE 100 MG Take 100 mg by mouth 2 times a day as needed for Constipation. Take 1 capsule by mouth twice daily as needed for constipation for up to 15 days.        Furosemide (Tab) LASIX 40 MG Take 1.5 Tabs by mouth every day.        Insulin Glargine (Solution) LANTUS 100 UNIT/ML Inject 10 Units as instructed every evening.        Multiple Vitamin (Tab) THERAGRAN  Take 1 Tab by mouth every day.        Spironolactone (Tab) ALDACTONE 25 MG Take 1 Tab by mouth every day.        .                 Medicines prescribed today were sent to:     [id number] - [place] - [address] [facility]    [address] [place] [address]    Phone: [phone] Fax: [phone]    Open 24 Hours?: No    [facility] - [place] -  [address] [address]    Phone: [phone] Fax: [phone]    Open 24 Hours?: No    [facility] [address] [place] [address] - [address] [facility]    [address] [facility] [address]    Phone: [phone] Fax: [phone]    Open 24 Hours?: No      Medication refill instructions:       If your prescription bottle indicates you have medication refills left, it is not necessary to call your provider’s office. Please contact your pharmacy and they will refill your medication.    If your prescription bottle indicates you do not have any refills left, you may request refills at any time through one of the following ways: The online Arzeda system (except Urgent Care), by calling your provider’s office, or by asking your pharmacy to contact your provider’s office with a refill request. Medication refills are processed only during regular business hours and may not be available until the next business day. Your provider may request additional information or to have a follow-up visit with you prior to refilling your medication.   *Please Note: Medication refills are assigned a new Rx number when refilled electronically. Your pharmacy may indicate that no refills were authorized even though a new prescription for the same medication is available at the pharmacy. Please request the medicine by name with the pharmacy before contacting your provider for a refill.        Other Notes About Your Plan     Patient is enrolled in [facility] with Dr. Coffey.    8/3/16--Chart reviewed for case management intervention.  Not appropriate for chronic care management at this time.           Arzeda Status: Patient Declined

## 2017-04-19 ENCOUNTER — HOSPITAL ENCOUNTER (OUTPATIENT)
Dept: LAB | Facility: MEDICAL CENTER | Age: 82
End: 2017-04-19
Attending: INTERNAL MEDICINE
Payer: MEDICARE

## 2017-04-19 LAB
ANION GAP SERPL CALC-SCNC: 9 MMOL/L (ref 0–11.9)
BNP SERPL-MCNC: 258 PG/ML (ref 0–100)
BUN SERPL-MCNC: 69 MG/DL (ref 8–22)
CALCIUM SERPL-MCNC: 9.8 MG/DL (ref 8.5–10.5)
CHLORIDE SERPL-SCNC: 108 MMOL/L (ref 96–112)
CO2 SERPL-SCNC: 20 MMOL/L (ref 20–33)
CREAT SERPL-MCNC: 1.74 MG/DL (ref 0.5–1.4)
GFR SERPL CREATININE-BSD FRML MDRD: 28 ML/MIN/1.73 M 2
GLUCOSE SERPL-MCNC: 109 MG/DL (ref 65–99)
POTASSIUM SERPL-SCNC: 4.6 MMOL/L (ref 3.6–5.5)
SODIUM SERPL-SCNC: 137 MMOL/L (ref 135–145)

## 2017-04-19 PROCEDURE — 83880 ASSAY OF NATRIURETIC PEPTIDE: CPT

## 2017-04-19 PROCEDURE — 80048 BASIC METABOLIC PNL TOTAL CA: CPT

## 2017-04-19 PROCEDURE — 36415 COLL VENOUS BLD VENIPUNCTURE: CPT

## 2017-05-23 ENCOUNTER — PATIENT OUTREACH (OUTPATIENT)
Dept: HEALTH INFORMATION MANAGEMENT | Facility: OTHER | Age: 82
End: 2017-05-23

## 2017-05-23 ENCOUNTER — TELEPHONE (OUTPATIENT)
Dept: HEALTH INFORMATION MANAGEMENT | Facility: OTHER | Age: 82
End: 2017-05-23

## 2017-05-23 NOTE — Clinical Note
5/23/2017  Ana Uriostegui [facility]  [address] [place] [address]     Dear Ana,    Based on your medical history, our records indicate that you are eligible for [facility] Care Coordination, a free program that focuses on coordinating the care of individuals with ongoing or complex medical needs. Coordinated care can decrease the total cost of care and, most importantly, improve your overall health.    As a Care Coordination participant, you’ll be assigned a personal care coordinator - a medical provider that specializes in carefully monitoring your health and providing care in between visits with your primary care provider and specialists. This program is of no cost to you as it’s a part of [facility]’s ongoing commitment to serving the people of our community.    Benefits of our free Care Coordination program include:  • Priority appointment scheduling with your [facility] healthcare team  • A personal care coordinator to coordinate your healthcare   • A comprehensive needs assessment  • Development of an individualized care plan   • Chronic disease education  • A free in-home monitoring device (for eligible patients)    Please contact us at [phone] as soon as possible to confirm your enrollment. Once you have confirmed your participation in this program, we will schedule an introduction with your personal care coordinator.     For your convenience, we are available 5 days a week from 8:30 a.m. - 5 p.m.    We look forward to speaking with you soon.    Sincerely,   Teetee BAIRD

## 2017-05-23 NOTE — TELEPHONE ENCOUNTER
Patient is over the recommended age and is showing overdue for Pap Smear, Mammogram, Colonoscopy and Bone Density in Health Maintenance. She said she no longer gets any of these exams.    Please reply to this message as to whether these tests are appropriate and I will update the Health Maintenance Topic or contact the patient to schedule.

## 2017-05-23 NOTE — PROGRESS NOTES
Attempt #:1    WebIZ Checked & Epic Updated: yes  HealthConnect Verified: yes  Verify PCP: yes    Communication Preference Obtained: yes     Review Care Team: yes    Annual Wellness Visit Scheduling  1. Scheduling Status:Scheduled     Care Coordination Enrollment (Attempt to Enroll in Care Coordination)  2. Is patient appropriate: YES  3. Is patient interested: NO - not interested     Care Gap Scheduling (Attempt to Schedule EACH Overdue Care Gap!)     Health Maintenance Due   Topic Date Due   • Annual Wellness Visit  Scheduled   • PAP SMEAR  Excempt   • COLONOSCOPY  Excempt   • IMM ZOSTER VACCINE  07/10/2014   • BONE DENSITY  Excempt   • URINE ACR / MICROALBUMIN  Informed   • MAMMOGRAM  Excempt   • RETINAL SCREENING  Requesting Records         MyChart Activation: declined  MyChart Bud: no  Virtual Visits: no  Opt In to Text Messages: no

## 2017-06-01 ENCOUNTER — PATIENT OUTREACH (OUTPATIENT)
Dept: HEALTH INFORMATION MANAGEMENT | Facility: OTHER | Age: 82
End: 2017-06-01

## 2017-06-01 NOTE — PROGRESS NOTES
Outcome: CALL BACK    WebIZ Checked & Epic Updated:  no    HealthConnect Verified: yes    Attempt # 1

## 2017-06-02 ENCOUNTER — HOSPITAL ENCOUNTER (EMERGENCY)
Facility: MEDICAL CENTER | Age: 82
End: 2017-06-02
Attending: EMERGENCY MEDICINE
Payer: MEDICARE

## 2017-06-02 ENCOUNTER — APPOINTMENT (OUTPATIENT)
Dept: RADIOLOGY | Facility: MEDICAL CENTER | Age: 82
End: 2017-06-02
Attending: EMERGENCY MEDICINE
Payer: MEDICARE

## 2017-06-02 VITALS
SYSTOLIC BLOOD PRESSURE: 110 MMHG | WEIGHT: 130 LBS | TEMPERATURE: 98.2 F | RESPIRATION RATE: 19 BRPM | BODY MASS INDEX: 26.21 KG/M2 | OXYGEN SATURATION: 81 % | HEART RATE: 48 BPM | HEIGHT: 59 IN | DIASTOLIC BLOOD PRESSURE: 57 MMHG

## 2017-06-02 DIAGNOSIS — R60.0 PEDAL EDEMA: ICD-10-CM

## 2017-06-02 DIAGNOSIS — I50.9 CHRONIC CONGESTIVE HEART FAILURE, UNSPECIFIED CONGESTIVE HEART FAILURE TYPE: ICD-10-CM

## 2017-06-02 LAB
ALBUMIN SERPL BCP-MCNC: 4.1 G/DL (ref 3.2–4.9)
ALBUMIN/GLOB SERPL: 1.3 G/DL
ALP SERPL-CCNC: 79 U/L (ref 30–99)
ALT SERPL-CCNC: 18 U/L (ref 2–50)
ANION GAP SERPL CALC-SCNC: 11 MMOL/L (ref 0–11.9)
APPEARANCE UR: CLEAR
APTT PPP: 30.3 SEC (ref 24.7–36)
AST SERPL-CCNC: 22 U/L (ref 12–45)
BACTERIA #/AREA URNS HPF: ABNORMAL /HPF
BASOPHILS # BLD AUTO: 0.7 % (ref 0–1.8)
BASOPHILS # BLD: 0.05 K/UL (ref 0–0.12)
BILIRUB SERPL-MCNC: 0.6 MG/DL (ref 0.1–1.5)
BILIRUB UR QL STRIP.AUTO: NEGATIVE
BNP SERPL-MCNC: 312 PG/ML (ref 0–100)
BUN SERPL-MCNC: 60 MG/DL (ref 8–22)
CALCIUM SERPL-MCNC: 9.9 MG/DL (ref 8.5–10.5)
CHLORIDE SERPL-SCNC: 101 MMOL/L (ref 96–112)
CO2 SERPL-SCNC: 24 MMOL/L (ref 20–33)
COLOR UR: COLORLESS
CREAT SERPL-MCNC: 1.48 MG/DL (ref 0.5–1.4)
CULTURE IF INDICATED INDCX: YES UA CULTURE
EKG IMPRESSION: NORMAL
EOSINOPHIL # BLD AUTO: 0.26 K/UL (ref 0–0.51)
EOSINOPHIL NFR BLD: 3.6 % (ref 0–6.9)
ERYTHROCYTE [DISTWIDTH] IN BLOOD BY AUTOMATED COUNT: 43.6 FL (ref 35.9–50)
GFR SERPL CREATININE-BSD FRML MDRD: 34 ML/MIN/1.73 M 2
GLOBULIN SER CALC-MCNC: 3.1 G/DL (ref 1.9–3.5)
GLUCOSE SERPL-MCNC: 127 MG/DL (ref 65–99)
GLUCOSE UR STRIP.AUTO-MCNC: NEGATIVE MG/DL
HCT VFR BLD AUTO: 38.2 % (ref 37–47)
HGB BLD-MCNC: 12.9 G/DL (ref 12–16)
IMM GRANULOCYTES # BLD AUTO: 0.01 K/UL (ref 0–0.11)
IMM GRANULOCYTES NFR BLD AUTO: 0.1 % (ref 0–0.9)
INR PPP: 1.13 (ref 0.87–1.13)
KETONES UR STRIP.AUTO-MCNC: NEGATIVE MG/DL
LEUKOCYTE ESTERASE UR QL STRIP.AUTO: ABNORMAL
LYMPHOCYTES # BLD AUTO: 1.64 K/UL (ref 1–4.8)
LYMPHOCYTES NFR BLD: 22.9 % (ref 22–41)
MCH RBC QN AUTO: 32.5 PG (ref 27–33)
MCHC RBC AUTO-ENTMCNC: 33.8 G/DL (ref 33.6–35)
MCV RBC AUTO: 96.2 FL (ref 81.4–97.8)
MICRO URNS: ABNORMAL
MONOCYTES # BLD AUTO: 0.73 K/UL (ref 0–0.85)
MONOCYTES NFR BLD AUTO: 10.2 % (ref 0–13.4)
NEUTROPHILS # BLD AUTO: 4.46 K/UL (ref 2–7.15)
NEUTROPHILS NFR BLD: 62.5 % (ref 44–72)
NITRITE UR QL STRIP.AUTO: NEGATIVE
NRBC # BLD AUTO: 0 K/UL
NRBC BLD AUTO-RTO: 0 /100 WBC
PH UR STRIP.AUTO: 6.5 [PH]
PLATELET # BLD AUTO: 233 K/UL (ref 164–446)
PMV BLD AUTO: 11.7 FL (ref 9–12.9)
POTASSIUM SERPL-SCNC: 3.9 MMOL/L (ref 3.6–5.5)
PROT SERPL-MCNC: 7.2 G/DL (ref 6–8.2)
PROT UR QL STRIP: NEGATIVE MG/DL
PROTHROMBIN TIME: 14.9 SEC (ref 12–14.6)
RBC # BLD AUTO: 3.97 M/UL (ref 4.2–5.4)
RBC # URNS HPF: ABNORMAL /HPF
RBC UR QL AUTO: NEGATIVE
SODIUM SERPL-SCNC: 136 MMOL/L (ref 135–145)
SP GR UR STRIP.AUTO: 1
TROPONIN I SERPL-MCNC: 0.04 NG/ML (ref 0–0.04)
WBC # BLD AUTO: 7.2 K/UL (ref 4.8–10.8)
WBC #/AREA URNS HPF: ABNORMAL /HPF

## 2017-06-02 PROCEDURE — 83880 ASSAY OF NATRIURETIC PEPTIDE: CPT

## 2017-06-02 PROCEDURE — 71010 DX-CHEST-PORTABLE (1 VIEW): CPT

## 2017-06-02 PROCEDURE — 93005 ELECTROCARDIOGRAM TRACING: CPT | Performed by: EMERGENCY MEDICINE

## 2017-06-02 PROCEDURE — 85025 COMPLETE CBC W/AUTO DIFF WBC: CPT

## 2017-06-02 PROCEDURE — 700111 HCHG RX REV CODE 636 W/ 250 OVERRIDE (IP): Performed by: EMERGENCY MEDICINE

## 2017-06-02 PROCEDURE — 85730 THROMBOPLASTIN TIME PARTIAL: CPT

## 2017-06-02 PROCEDURE — 80053 COMPREHEN METABOLIC PANEL: CPT

## 2017-06-02 PROCEDURE — A9270 NON-COVERED ITEM OR SERVICE: HCPCS | Performed by: EMERGENCY MEDICINE

## 2017-06-02 PROCEDURE — 99284 EMERGENCY DEPT VISIT MOD MDM: CPT

## 2017-06-02 PROCEDURE — 96374 THER/PROPH/DIAG INJ IV PUSH: CPT

## 2017-06-02 PROCEDURE — 87186 SC STD MICRODIL/AGAR DIL: CPT

## 2017-06-02 PROCEDURE — 85610 PROTHROMBIN TIME: CPT

## 2017-06-02 PROCEDURE — 87086 URINE CULTURE/COLONY COUNT: CPT

## 2017-06-02 PROCEDURE — 81001 URINALYSIS AUTO W/SCOPE: CPT

## 2017-06-02 PROCEDURE — 84484 ASSAY OF TROPONIN QUANT: CPT

## 2017-06-02 PROCEDURE — 87077 CULTURE AEROBIC IDENTIFY: CPT

## 2017-06-02 PROCEDURE — 700102 HCHG RX REV CODE 250 W/ 637 OVERRIDE(OP): Performed by: EMERGENCY MEDICINE

## 2017-06-02 RX ORDER — POTASSIUM CHLORIDE 20 MEQ/1
40 TABLET, EXTENDED RELEASE ORAL ONCE
Status: COMPLETED | OUTPATIENT
Start: 2017-06-02 | End: 2017-06-02

## 2017-06-02 RX ORDER — FUROSEMIDE 10 MG/ML
80 INJECTION INTRAMUSCULAR; INTRAVENOUS ONCE
Status: COMPLETED | OUTPATIENT
Start: 2017-06-02 | End: 2017-06-02

## 2017-06-02 RX ADMIN — POTASSIUM CHLORIDE 40 MEQ: 1500 TABLET, EXTENDED RELEASE ORAL at 19:22

## 2017-06-02 RX ADMIN — FUROSEMIDE 80 MG: 10 INJECTION, SOLUTION INTRAMUSCULAR; INTRAVENOUS at 18:15

## 2017-06-02 ASSESSMENT — LIFESTYLE VARIABLES: DO YOU DRINK ALCOHOL: NO

## 2017-06-02 ASSESSMENT — PAIN SCALES - GENERAL: PAINLEVEL_OUTOF10: 0

## 2017-06-02 NOTE — ED NOTES
"Chief Complaint   Patient presents with   • Sent by MD     Cardiologist [place].    • Weight Gain     5-6 lbs over the past week.  \"My furosemide stopped working.\"    Pt to triage in NAD.  Pt denies SOB or any other symptoms other than weight gain, \"I feel fine.\"  Pt educated on triage process and instructed to notify triage RN of any change in status.           "

## 2017-06-02 NOTE — ED AVS SNAPSHOT
Mobly Access Code: Activation code not generated  Current Mobly Status: Patient Declined    Your email address is not on file at [facility].  Email Addresses are required for you to sign up for Mobly, please contact [phone] to verify your personal information and to provide your email address prior to attempting to register for Tercicat.    Ana Torres  [address] [place] [address]    Mobly  A secure, online tool to manage your health information     [facility]’s Mobly® is a secure, online tool that connects you to your personalized health information from the privacy of your home -- day or night - making it very easy for you to manage your healthcare. Once the activation process is completed, you can even access your medical information using the Mobly bud, which is available for free in the Apple Bud store or Google Play store.     To learn more about Mobly, visit [email or web address]    There are two levels of access available (as shown below):   My Chart Features  [facility] Primary Care Doctor [facility]  Specialists [facility]  Urgent  Care Non-[facility] Primary Care Doctor   Email your healthcare team securely and privately 24/7 X X X    Manage appointments: schedule your next appointment; view details of past/upcoming appointments X      Request prescription refills. X      View recent personal medical records, including lab and immunizations X X X X   View health record, including health history, allergies, medications X X X X   Read reports about your outpatient visits, procedures, consult and ER notes X X X X   See your discharge summary, which is a recap of your hospital and/or ER visit that includes your diagnosis, lab results, and care plan X X  X     How to register for Tercicat:  Once your e-mail address has been verified, follow the following steps to sign up for Tercicat.     1. Go to  [email or web address]  2. Click on the Sign Up Now box, which takes you to the  New Member Sign Up page. You will need to provide the following information:  a. Enter your Listen Edition Access Code exactly as it appears at the top of this page. (You will not need to use this code after you’ve completed the sign-up process. If you do not sign up before the expiration date, you must request a new code.)   b. Enter your date of birth.   c. Enter your home email address.   d. Click Submit, and follow the next screen’s instructions.  3. Create a Listen Edition ID. This will be your Listen Edition login ID and cannot be changed, so think of one that is secure and easy to remember.  4. Create a Listen Edition password. You can change your password at any time.  5. Enter your Password Reset Question and Answer. This can be used at a later time if you forget your password.   6. Enter your e-mail address. This allows you to receive e-mail notifications when new information is available in Listen Edition.  7. Click Sign Up. You can now view your health information.    For assistance activating your Listen Edition account, call [phone]

## 2017-06-02 NOTE — ED AVS SNAPSHOT
6/2/2017    Ana Uriostegui Melissa  [address] [place] [address]    Dear Ana:    [facility] wants to ensure your discharge home is safe and you or your loved ones have had all of your questions answered regarding your care after you leave the hospital.    Below is a list of resources and contact information should you have any questions regarding your hospital stay, follow-up instructions, or active medical symptoms.    Questions or Concerns Regarding… Contact   Medical Questions Related to Your Discharge  (7 days a week, 8am-5pm) Contact a Nurse Care Coordinator   [phone]   Medical Questions Not Related to Your Discharge  (24 hours a day / 7 days a week)  Contact the Nurse Health Line   [phone]    Medications or Discharge Instructions Refer to your discharge packet   or contact your [facility] Primary Care Provider   [phone]   Follow-up Appointment(s) Schedule your appointment via Imgur   or contact Scheduling [phone]   Billing Review your statement via Imgur  or contact Billing [phone]   Medical Records Review your records via Imgur   or contact Medical Records [phone]     You may receive a telephone call within two days of discharge. This call is to make certain you understand your discharge instructions and have the opportunity to have any questions answered. You can also easily access your medical information, test results and upcoming appointments via the Imgur free online health management tool. You can learn more and sign up at [email or web address]. For assistance setting up your Imgur account, please call [phone].    Once again, we want to ensure your discharge home is safe and that you have a clear understanding of any next steps in your care. If you have any questions or concerns, please do not hesitate to contact us, we are here for you. Thank you for choosing [facility] for your healthcare needs.    Sincerely,    Your [facility] Healthcare Team

## 2017-06-02 NOTE — ED AVS SNAPSHOT
Home Care Instructions                                                                                                                Ana Torres   MRN: [id number]    Department:  [facility], Emergency Dept   Date of Visit:  6/2/2017            [facility], Emergency Dept    [address]    [place] [address]    Phone:  [phone]      You were seen by     Ismael Miramontes M.D.      Your Diagnosis Was     Chronic congestive heart failure, unspecified congestive heart failure type (CMS-HCC)     I50.9       These are the medications you received during your hospitalization from 06/02/2017 1412 to 06/02/2017 1916     Date/Time Order Dose Route Action    06/02/2017 1815 furosemide (LASIX) injection 80 mg 80 mg Intravenous Given      Follow-up Information     1. Follow up with Adryan Simmons M.D..    Specialty:  Cardiology    Why:  follow-up with her cardiologist on Monday. Over the weekend  you can take 1 or 2 extra tablets of your Lasix    Contact information    [address] [address] [phone]        Medication Information     Review all of your home medications and newly ordered medications with your primary doctor and/or pharmacist as soon as possible. Follow medication instructions as directed by your doctor and/or pharmacist.     Please keep your complete medication list with you and share with your physician. Update the information when medications are discontinued, doses are changed, or new medications (including over-the-counter products) are added; and carry medication information at all times in the event of emergency situations.               Medication List      ASK your doctor about these medications        Instructions    Morning Afternoon Evening Bedtime    amlodipine 5 MG Tabs   Commonly known as:  NORVASC        Take 1 Tab by mouth every day.   Dose:  5 mg                        apixaban 5mg Tabs   Commonly known  as:  ELIQUIS        Take 1 Tab by mouth 2 Times a Day.   Dose:  5 mg                        atorvastatin 80 MG tablet   Commonly known as:  LIPITOR        Take 1 Tab by mouth every morning.   Dose:  80 mg                        carvedilol 25 MG Tabs   Commonly known as:  COREG        Take 1 Tab by mouth 2 times a day, with meals.   Dose:  25 mg                        docusate sodium 100 MG Caps   Commonly known as:  COLACE        Take 100 mg by mouth 2 times a day as needed for Constipation. Take 1 capsule by mouth twice daily as needed for constipation for up to 15 days.   Dose:  100 mg                        furosemide 40 MG Tabs   Commonly known as:  LASIX        Take 1.5 Tabs by mouth every day.   Dose:  60 mg                        insulin glargine 100 UNIT/ML Soln   Commonly known as:  LANTUS        Inject 10 Units as instructed every evening.   Dose:  10 Units                        multivitamin Tabs        Take 1 Tab by mouth every day.   Dose:  1 Tab                        spironolactone 25 MG Tabs   Commonly known as:  ALDACTONE        Take 1 Tab by mouth every day.   Dose:  25 mg                                Procedures and tests performed during your visit     APTT    BTYPE NATRIURETIC PEPTIDE    CARDIAC MONITORING    CBC WITH DIFFERENTIAL    COMP METABOLIC PANEL    DX-CHEST-PORTABLE (1 VIEW)    EKG (ER)    ESTIMATED GFR    PROTHROMBIN TIME    SALINE LOCK    TROPONIN    URINALYSIS,CULTURE IF INDICATED    URINE CULTURE(NEW)    URINE MICROSCOPIC (W/UA)        Discharge Instructions       Edema  Edema is an abnormal buildup of fluids. It is more common in your legs and thighs. Painless swelling of the feet and ankles is more likely as a person ages. It also is common in looser skin, like around your eyes.  HOME CARE   · Keep the affected body part above the level of the heart while lying down.  · Do not sit still or stand for a long time.  · Do not put anything right under your knees when you lie  down.  · Do not wear tight clothes on your upper legs.  · Exercise your legs to help the puffiness (swelling) go down.  · Wear elastic bandages or support stockings as told by your doctor.  · A low-salt diet may help lessen the puffiness.  · Only take medicine as told by your doctor.  GET HELP IF:  · Treatment is not working.  · You have heart, liver, or kidney disease and notice that your skin looks puffy or shiny.  · You have puffiness in your legs that does not get better when you raise your legs.  · You have sudden weight gain for no reason.  GET HELP RIGHT AWAY IF:   · You have shortness of breath or chest pain.  · You cannot breathe when you lie down.  · You have pain, redness, or warmth in the areas that are puffy.  · You have heart, liver, or kidney disease and get edema all of a sudden.  · You have a fever and your symptoms get worse all of a sudden.  MAKE SURE YOU:   · Understand these instructions.  · Will watch your condition.  · Will get help right away if you are not doing well or get worse.     This information is not intended to replace advice given to you by your health care provider. Make sure you discuss any questions you have with your health care provider.     Document Released: 06/05/2009 Document Revised: 12/23/2014 Document Reviewed: 10/10/2014  [employer] Interactive Patient Education ©2016 [employer]  Heart Failure  Heart failure is a condition in which the heart has trouble pumping blood. This means your heart does not pump blood efficiently for your body to work well. In some cases of heart failure, fluid may back up into your lungs or you may have swelling (edema) in your lower legs. Heart failure is usually a long-term (chronic) condition. It is important for you to take good care of yourself and follow your health care provider's treatment plan.  CAUSES   Some health conditions can cause heart failure. Those health conditions include:  · High blood pressure (hypertension).  Hypertension causes the heart muscle to work harder than normal. When pressure in the blood vessels is high, the heart needs to pump (contract) with more force in order to circulate blood throughout the body. High blood pressure eventually causes the heart to become stiff and weak.  · Coronary artery disease (CAD). CAD is the buildup of cholesterol and fat (plaque) in the arteries of the heart. The blockage in the arteries deprives the heart muscle of oxygen and blood. This can cause chest pain and may lead to a heart attack. High blood pressure can also contribute to CAD.  · Heart attack (myocardial infarction). A heart attack occurs when one or more arteries in the heart become blocked. The loss of oxygen damages the muscle tissue of the heart. When this happens, part of the heart muscle dies. The injured tissue does not contract as well and weakens the heart's ability to pump blood.  · Abnormal heart valves. When the heart valves do not open and close properly, it can cause heart failure. This makes the heart muscle pump harder to keep the blood flowing.  · Heart muscle disease (cardiomyopathy or myocarditis). Heart muscle disease is damage to the heart muscle from a variety of causes. These can include drug or alcohol abuse, infections, or unknown reasons. These can increase the risk of heart failure.  · Lung disease. Lung disease makes the heart work harder because the lungs do not work properly. This can cause a strain on the heart, leading it to fail.  · Diabetes. Diabetes increases the risk of heart failure. High blood sugar contributes to high fat (lipid) levels in the blood. Diabetes can also cause slow damage to tiny blood vessels that carry important nutrients to the heart muscle. When the heart does not get enough oxygen and food, it can cause the heart to become weak and stiff. This leads to a heart that does not contract efficiently.  · Other conditions can contribute to heart failure. These include  abnormal heart rhythms, thyroid problems, and low blood counts (anemia).  Certain unhealthy behaviors can increase the risk of heart failure, including:  · Being overweight.  · Smoking or chewing tobacco.  · Eating foods high in fat and cholesterol.  · Abusing illicit drugs or alcohol.  · Lacking physical activity.  SYMPTOMS   Heart failure symptoms may vary and can be hard to detect. Symptoms may include:  · Shortness of breath with activity, such as climbing stairs.  · Persistent cough.  · Swelling of the feet, ankles, legs, or abdomen.  · Unexplained weight gain.  · Difficulty breathing when lying flat (orthopnea).  · Waking from sleep because of the need to sit up and get more air.  · Rapid heartbeat.  · Fatigue and loss of energy.  · Feeling light-headed, dizzy, or close to fainting.  · Loss of appetite.  · Nausea.  · Increased urination during the night (nocturia).  DIAGNOSIS   A diagnosis of heart failure is based on your history, symptoms, physical examination, and diagnostic tests. Diagnostic tests for heart failure may include:  · Echocardiography.  · Electrocardiography.  · Chest X-ray.  · Blood tests.  · Exercise stress test.  · Cardiac angiography.  · Radionuclide scans.  TREATMENT   Treatment is aimed at managing the symptoms of heart failure. Medicines, behavioral changes, or surgical intervention may be necessary to treat heart failure.  · Medicines to help treat heart failure may include:  ¨ Angiotensin-converting enzyme (ACE) inhibitors. This type of medicine blocks the effects of a blood protein called angiotensin-converting enzyme. ACE inhibitors relax (dilate) the blood vessels and help lower blood pressure.  ¨ Angiotensin receptor blockers (ARBs). This type of medicine blocks the actions of a blood protein called angiotensin. Angiotensin receptor blockers dilate the blood vessels and help lower blood pressure.  ¨ Water pills (diuretics). Diuretics cause the kidneys to remove salt and water from  the blood. The extra fluid is removed through urination. This loss of extra fluid lowers the volume of blood the heart pumps.  ¨ Beta blockers. These prevent the heart from beating too fast and improve heart muscle strength.  ¨ Digitalis. This increases the force of the heartbeat.  · Healthy behavior changes include:  ¨ Obtaining and maintaining a healthy weight.  ¨ Stopping smoking or chewing tobacco.  ¨ Eating heart-healthy foods.  ¨ Limiting or avoiding alcohol.  ¨ Stopping illicit drug use.  ¨ Physical activity as directed by your health care provider.  · Surgical treatment for heart failure may include:  ¨ A procedure to open blocked arteries, repair damaged heart valves, or remove damaged heart muscle tissue.  ¨ A pacemaker to improve heart muscle function and control certain abnormal heart rhythms.  ¨ An internal cardioverter defibrillator to treat certain serious abnormal heart rhythms.  ¨ A left ventricular assist device (LVAD) to assist the pumping ability of the heart.  HOME CARE INSTRUCTIONS   · Take medicines only as directed by your health care provider. Medicines are important in reducing the workload of your heart, slowing the progression of heart failure, and improving your symptoms.  ¨ Do not stop taking your medicine unless directed by your health care provider.  ¨ Do not skip any dose of medicine.  ¨ Refill your prescriptions before you run out of medicine. Your medicines are needed every day.  · Engage in moderate physical activity if directed by your health care provider. Moderate physical activity can benefit some people. The elderly and people with severe heart failure should consult with a health care provider for physical activity recommendations.  · Eat heart-healthy foods. Food choices should be free of trans fat and low in saturated fat, cholesterol, and salt (sodium). Healthy choices include fresh or frozen fruits and vegetables, fish, lean meats, legumes, fat-free or low-fat dairy  products, and whole grain or high fiber foods. Talk to a dietitian to learn more about heart-healthy foods.  · Limit sodium if directed by your health care provider. Sodium restriction may reduce symptoms of heart failure in some people. Talk to a dietitian to learn more about heart-healthy seasonings.  · Use healthy cooking methods. Healthy cooking methods include roasting, grilling, broiling, baking, poaching, steaming, or stir-frying. Talk to a dietitian to learn more about healthy cooking methods.  · Limit fluids if directed by your health care provider. Fluid restriction may reduce symptoms of heart failure in some people.  · Weigh yourself every day. Daily weights are important in the early recognition of excess fluid. You should weigh yourself every morning after you urinate and before you eat breakfast. Wear the same amount of clothing each time you weigh yourself. Record your daily weight. Provide your health care provider with your weight record.  · Monitor and record your blood pressure if directed by your health care provider.  · Check your pulse if directed by your health care provider.  · Lose weight if directed by your health care provider. Weight loss may reduce symptoms of heart failure in some people.  · Stop smoking or chewing tobacco. Nicotine makes your heart work harder by causing your blood vessels to constrict. Do not use nicotine gum or patches before talking to your health care provider.  · Keep all follow-up visits as directed by your health care provider. This is important.  · Limit alcohol intake to no more than 1 drink per day for nonpregnant women and 2 drinks per day for men. One drink equals 12 ounces of beer, 5 ounces of wine, or 1½ ounces of hard liquor. Drinking more than that is harmful to your heart. Tell your health care provider if you drink alcohol several times a week. Talk with your health care provider about whether alcohol is safe for you. If your heart has already been  damaged by alcohol or you have severe heart failure, drinking alcohol should be stopped completely.  · Stop illicit drug use.  · Stay up-to-date with immunizations. It is especially important to prevent respiratory infections through current pneumococcal and influenza immunizations.  · Manage other health conditions such as hypertension, diabetes, thyroid disease, or abnormal heart rhythms as directed by your health care provider.  · Learn to manage stress.  · Plan rest periods when fatigued.  · Learn strategies to manage high temperatures. If the weather is extremely hot:  ¨ Avoid vigorous physical activity.  ¨ Use air conditioning or fans or seek a cooler location.  ¨ Avoid caffeine and alcohol.  ¨ Wear loose-fitting, lightweight, and light-colored clothing.  · Learn strategies to manage cold temperatures. If the weather is extremely cold:  ¨ Avoid vigorous physical activity.  ¨ Layer clothes.  ¨ Wear mittens or gloves, a hat, and a scarf when going outside.  ¨ Avoid alcohol.  · Obtain ongoing education and support as needed.  · Participate in or seek rehabilitation as needed to maintain or improve independence and quality of life.  SEEK MEDICAL CARE IF:   · You have a rapid weight gain.  · You have increasing shortness of breath that is unusual for you.  · You are unable to participate in your usual physical activities.  · You tire easily.  · You cough more than normal, especially with physical activity.  · You have any or more swelling in areas such as your hands, feet, ankles, or abdomen.  · You are unable to sleep because it is hard to breathe.  · You feel like your heart is beating fast (palpitations).  · You become dizzy or light-headed upon standing up.  SEEK IMMEDIATE MEDICAL CARE IF:   · You have difficulty breathing.  · There is a change in mental status such as decreased alertness or difficulty with concentration.  · You have a pain or discomfort in your chest.  · You have an episode of fainting  (syncope).  MAKE SURE YOU:   · Understand these instructions.  · Will watch your condition.  · Will get help right away if you are not doing well or get worse.     This information is not intended to replace advice given to you by your health care provider. Make sure you discuss any questions you have with your health care provider.     Document Released: 12/18/2006 Document Revised: 05/03/2016 Document Reviewed: 01/17/2014  [employer] Interactive Patient Education ©2016 [employer]            Patient Information     Patient Information    Following emergency treatment: all patient requiring follow-up care must return either to a private physician or a clinic if your condition worsens before you are able to obtain further medical attention, please return to the emergency room.     Billing Information    At [facility], we work to make the billing process streamlined for our patients.  Our Representatives are here to answer any questions you may have regarding your hospital bill.  If you have insurance coverage and have supplied your insurance information to us, we will submit a claim to your insurer on your behalf.  Should you have any questions regarding your bill, we can be reached online or by phone as follows:  Online: You are able pay your bills online or live chat with our representatives about any billing questions you may have. We are here to help Monday - Friday from 8:00am to 7:30pm and 9:00am - 12:00pm on Saturdays.  Please visit [email or web address]  for more information.   Phone:  [phone] or [phone]    Please note that your emergency physician, surgeon, pathologist, radiologist, anesthesiologist, and other specialists are not employed by [facility] and will therefore bill separately for their services.  Please contact them directly for any questions concerning their bills at the numbers below:     Emergency Physician Services:  [phone]  [facility]:  [phone]  [facility]:  [phone]  [facility]:  [phone]    1. Your final bill may vary from the amount quoted upon discharge if all procedures are not complete at that time, or if your doctor has additional procedures of which we are not aware. You will receive an additional bill if you return to the Emergency Department at [facility] for suture removal regardless of the facility of which the sutures were placed.     2. Please arrange for settlement of this account at the emergency registration.    3. All self-pay accounts are due in full at the time of treatment.  If you are unable to meet this obligation then payment is expected within 4-5 days.     4. If you have had radiology studies (CT, X-ray, Ultrasound, MRI), you have received a preliminary result during your emergency department visit. Please contact the radiology department [phone] to receive a copy of your final result. Please discuss the Final result with your primary physician or with the follow up physician provided.     Crisis Hotline:  [place] Crisis Hotline:  5-389-SAYPLGJ or 1-880.412.3597  [place] Crisis Hotline:    1-280.607.7697 or 090-311-1716         ED Discharge Follow Up Questions    1. In order to provide you with very good care, we would like to follow up with a phone call in the next few days.  May we have your permission to contact you?     YES /  NO    2. What is the best phone number to call you? (       )_____-__________    3. What is the best time to call you?      Morning  /  Afternoon  /  Evening                   Patient Signature:  ____________________________________________________________    Date:  ____________________________________________________________      Your appointments     Jul 25, 2017 10:00 AM   ANNUAL WELLNESS with Rich Coffey M.D., [facility]    [address]    [address] [place], [place] [address]e [place] [address]  [place]  [address]   [phone]

## 2017-06-02 NOTE — ED PROVIDER NOTES
"ED Provider Note    CHIEF COMPLAINT  Chief Complaint   Patient presents with   • Sent by MD     Cardiologist Troy.    • Weight Gain     5-6 lbs over the past week.  \"My furosemide stopped working.\"        HPI  Ana Torres is a 81 y.o. female who presents for evaluation of leg swelling, 5-6 pounds weight gain. The patient has a complex past medical history as listed below. This includes coronary artery disease, CHF stage III kidney disease. She is on an extensive medication regimen including diuretics. She has reportedly been taking all them not changing any doses skipping any doses. She described the symptoms to her cardiologist Dr. Simmons. He is concerned that she could either be going into worsening heart and/or renal failure or combination of both and recommended she come here for evaluation. The patient denies any chest pain area she reports chronic shortness of breath. She reports worsening leg swelling over the past few days.    REVIEW OF SYSTEMS  See HPI for further details. Positive for weight gain or leg swelling general fatigue All other systems are negative.     PAST MEDICAL HISTORY  Past Medical History   Diagnosis Date   • HTN (hypertension), benign    • Hyperlipidemia    • CAD (coronary artery disease)    • Cystocele    • PVD (peripheral vascular disease) (CMS-[facility])    • DJD (degenerative joint disease)    • CHF (congestive heart failure) (CMS-HCC)    • Unspecified urinary incontinence    • Urinary bladder disorder    • Diabetes      insulin   • CATARACT    • Renal disorder      \"watching my kidneys\"     • Myocardial infarct (CMS-HCC) 2000   • Medical home        FAMILY HISTORY  No history of bleeding disorder    SOCIAL HISTORY  Social History     Social History   • Marital Status:      Spouse Name: N/A   • Number of Children: N/A   • Years of Education: N/A     Social History Main Topics   • Smoking status: Never Smoker    • Smokeless tobacco: Never Used   • Alcohol Use: No   • " "Drug Use: No   • Sexual Activity: Not Currently     Other Topics Concern   • Not on file     Social History Narrative    nonsmoker lives locally    SURGICAL HISTORY  Past Surgical History   Procedure Laterality Date   • Abdominal hysterectomy total     • Multiple coronary artery bypass  2004   • Cataract phaco with iol  3/12/2014     Performed by Murtaza Casey M.D. at SURGERY SAME DAY [facility] ORS       CURRENT MEDICATIONS    Current facility-administered medications:   •  potassium chloride SA (Kdur) tablet 40 mEq, 40 mEq, Oral, Once, Ismael Miramontes M.D.    Current outpatient prescriptions:   •  furosemide (LASIX) 40 MG Tab, Take 1.5 Tabs by mouth every day., Disp: 30 Tab, Rfl:   •  amlodipine (NORVASC) 5 MG Tab, Take 1 Tab by mouth every day., Disp: 90 Tab, Rfl: 1  •  insulin glargine (LANTUS) 100 UNIT/ML Solution, Inject 10 Units as instructed every evening., Disp: , Rfl:   •  apixaban (ELIQUIS) 5mg Tab, Take 1 Tab by mouth 2 Times a Day., Disp: 60 Tab, Rfl: 11  •  carvedilol (COREG) 25 MG Tab, Take 1 Tab by mouth 2 times a day, with meals., Disp: 60 Tab, Rfl: 2  •  atorvastatin (LIPITOR) 80 MG tablet, Take 1 Tab by mouth every morning., Disp: 90 Tab, Rfl: 1  •  spironolactone (ALDACTONE) 25 MG Tab, Take 1 Tab by mouth every day., Disp: 30 Tab, Rfl: 3  •  docusate sodium (COLACE) 100 MG Cap, Take 100 mg by mouth 2 times a day as needed for Constipation. Take 1 capsule by mouth twice daily as needed for constipation for up to 15 days., Disp: , Rfl:   •  multivitamin (THERAGRAN) Tab, Take 1 Tab by mouth every day., Disp: 30 Tab, Rfl: 1      ALLERGIES  Allergies   Allergen Reactions   • Tetracaine Hcl [Tetcaine]    • Vancomycin    • Vigamox [Moxifloxacin]        PHYSICAL EXAM  VITAL SIGNS: /57 mmHg  Pulse 47  Temp(Src) 36.8 °C (98.2 °F) (Temporal)  Resp 22  Ht 1.499 m (4' 11.02\")  Wt 58.968 kg (130 lb)  BMI 26.24 kg/m2  SpO2 97%      Constitutional: Well developed, Well nourished, No acute " distress, Non-toxic appearance.   HENT: Normocephalic, Atraumatic, Bilateral external ears normal, Oropharynx moist, No oral exudates, Nose normal.   Eyes: PERRLA, EOMI, Conjunctiva normal, No discharge.   Neck: Normal range of motion, No tenderness, Supple, No stridor.   Cardiovascular: Normal heart rate, Normal rhythm, No murmurs, No rubs, No gallops.   Thorax & Lungs: Lungs are clear to auscultation bilaterally No chest tenderness.   Abdomen: Bowel sounds normal, Soft, No tenderness, No masses, No pulsatile masses.   Skin: Warm, Dry, No erythema, No rash.   Back: No tenderness, No CVA tenderness.   Extremities: Intact distal pulses, bilateral 3+ pitting edema from the mid tibial area down to the feet.   Neurologic: Alert & oriented x 3, Normal motor function, Normal sensory function, No focal deficits noted.   Psychiatric: Affect normal, Judgment normal, Mood normal.     EKG  Interpretation by me rate 51 abnormal R-wave progression early repolarization noted in the precordial and lateral leads which is unchanged from prior. P waves are inconsistently visualized.  Results for orders placed or performed during the hospital encounter of 06/02/17   URINALYSIS,CULTURE IF INDICATED   Result Value Ref Range    Micro Urine Req Microscopic     Color Colorless     Character Clear     Specific Gravity 1.004 <1.035    Ph 6.5 5.0-8.0    Glucose Negative Negative mg/dL    Ketones Negative Negative mg/dL    Protein Negative Negative mg/dL    Bilirubin Negative Negative    Nitrite Negative Negative    Leukocyte Esterase Trace (A) Negative    Occult Blood Negative Negative    Culture Indicated Yes UA Culture   CBC WITH DIFFERENTIAL   Result Value Ref Range    WBC 7.2 4.8 - 10.8 K/uL    RBC 3.97 (L) 4.20 - 5.40 M/uL    Hemoglobin 12.9 12.0 - 16.0 g/dL    Hematocrit 38.2 37.0 - 47.0 %    MCV 96.2 81.4 - 97.8 fL    MCH 32.5 27.0 - 33.0 pg    MCHC 33.8 33.6 - 35.0 g/dL    RDW 43.6 35.9 - 50.0 fL    Platelet Count 233 164 - 446 K/uL     MPV 11.7 9.0 - 12.9 fL    Neutrophils-Polys 62.50 44.00 - 72.00 %    Lymphocytes 22.90 22.00 - 41.00 %    Monocytes 10.20 0.00 - 13.40 %    Eosinophils 3.60 0.00 - 6.90 %    Basophils 0.70 0.00 - 1.80 %    Immature Granulocytes 0.10 0.00 - 0.90 %    Nucleated RBC 0.00 /100 WBC    Neutrophils (Absolute) 4.46 2.00 - 7.15 K/uL    Lymphs (Absolute) 1.64 1.00 - 4.80 K/uL    Monos (Absolute) 0.73 0.00 - 0.85 K/uL    Eos (Absolute) 0.26 0.00 - 0.51 K/uL    Baso (Absolute) 0.05 0.00 - 0.12 K/uL    Immature Granulocytes (abs) 0.01 0.00 - 0.11 K/uL    NRBC (Absolute) 0.00 K/uL   COMP METABOLIC PANEL   Result Value Ref Range    Sodium 136 135 - 145 mmol/L    Potassium 3.9 3.6 - 5.5 mmol/L    Chloride 101 96 - 112 mmol/L    Co2 24 20 - 33 mmol/L    Anion Gap 11.0 0.0 - 11.9    Glucose 127 (H) 65 - 99 mg/dL    Bun 60 (H) 8 - 22 mg/dL    Creatinine 1.48 (H) 0.50 - 1.40 mg/dL    Calcium 9.9 8.5 - 10.5 mg/dL    AST(SGOT) 22 12 - 45 U/L    ALT(SGPT) 18 2 - 50 U/L    Alkaline Phosphatase 79 30 - 99 U/L    Total Bilirubin 0.6 0.1 - 1.5 mg/dL    Albumin 4.1 3.2 - 4.9 g/dL    Total Protein 7.2 6.0 - 8.2 g/dL    Globulin 3.1 1.9 - 3.5 g/dL    A-G Ratio 1.3 g/dL   TROPONIN   Result Value Ref Range    Troponin I 0.04 0.00 - 0.04 ng/mL   BTYPE NATRIURETIC PEPTIDE   Result Value Ref Range    B Natriuretic Peptide 312 (H) 0 - 100 pg/mL   PROTHROMBIN TIME   Result Value Ref Range    PT 14.9 (H) 12.0 - 14.6 sec    INR 1.13 0.87 - 1.13   APTT   Result Value Ref Range    APTT 30.3 24.7 - 36.0 sec   ESTIMATED GFR   Result Value Ref Range    GFR If  41 (A) >60 mL/min/1.73 m 2    GFR If Non  34 (A) >60 mL/min/1.73 m 2   URINE MICROSCOPIC (W/UA)   Result Value Ref Range    WBC 2-5 /hpf    RBC 0-2 /hpf    Bacteria Few (A) None /hpf   EKG (ER)   Result Value Ref Range    Report       [facility] Emergency Dept.    Test Date:  2017-06-02  Pt Name:    MORRO PINZON            Department: ER  MRN:         [id number]                      Room:       [facility]  Gender:     F                            Technician: [id number]  :        [id number]                   Requested By:ISMAEL MIRAMONTES  Order #:    [id number]                    Reading MD:    Measurements  Intervals                                Axis  Rate:       51                           P:  SD:                                      QRS:        -5  QRSD:       106                          T:          125  QT:         460  QTc:        424    Interpretive Statements  JUNCTIONAL ESCAPE RHYTHM  ABNRM R PROG, CONSIDER ASMI OR LEAD PLACEMENT  REPOL ABNRM SUGGESTS ISCHEMIA, LATERAL LEADS  Compared to ECG 2016 18:29:06  Junctional rhythm now present  Early repolarization now present  Possible ischemia now present  Sinus rhythm no longer present  Ventricular premature complex(es) no longer present  Myoc ardial infarct finding still present        RADIOLOGY/PROCEDURES  DX-CHEST-PORTABLE (1 VIEW)   Final Result      1.  Cardiomegaly.      2.  Elevation of the right hemidiaphragm.            COURSE & MEDICAL DECISION MAKING  Pertinent Labs & Imaging studies reviewed. (See chart for details)  Patient presents here with lower extremity edema and slightly elevated BNP. She has chronic CHF and cardiomegaly but no evidence of overt heart failure on exam. I think that she came in time before she got quite ill. I think that she will do well by getting a large dose of IV Lasix here. She was given 80 mg of IV Lasix. She is starting to diurese. I also gave her some supplemental oral potassium. I think that this should get her diuresed over the weekend and I recommended that she follow up with her regular doctor on Monday and she can take a few extra oral Lasix over the weekend     FINAL IMPRESSION  1. Lower extremity edema with mild CHF exacerbation         Electronically signed by: Ismael Miramontes, 2017 4:37 PM

## 2017-06-03 NOTE — ED NOTES
Discharge home w friend, given discharge instructions  And follow up, no questions at this time, iv dced no bleeding, provided clean dry clothes, to lobby via wheelchair

## 2017-06-03 NOTE — DISCHARGE INSTRUCTIONS
Edema  Edema is an abnormal buildup of fluids. It is more common in your legs and thighs. Painless swelling of the feet and ankles is more likely as a person ages. It also is common in looser skin, like around your eyes.  HOME CARE   · Keep the affected body part above the level of the heart while lying down.  · Do not sit still or stand for a long time.  · Do not put anything right under your knees when you lie down.  · Do not wear tight clothes on your upper legs.  · Exercise your legs to help the puffiness (swelling) go down.  · Wear elastic bandages or support stockings as told by your doctor.  · A low-salt diet may help lessen the puffiness.  · Only take medicine as told by your doctor.  GET HELP IF:  · Treatment is not working.  · You have heart, liver, or kidney disease and notice that your skin looks puffy or shiny.  · You have puffiness in your legs that does not get better when you raise your legs.  · You have sudden weight gain for no reason.  GET HELP RIGHT AWAY IF:   · You have shortness of breath or chest pain.  · You cannot breathe when you lie down.  · You have pain, redness, or warmth in the areas that are puffy.  · You have heart, liver, or kidney disease and get edema all of a sudden.  · You have a fever and your symptoms get worse all of a sudden.  MAKE SURE YOU:   · Understand these instructions.  · Will watch your condition.  · Will get help right away if you are not doing well or get worse.     This information is not intended to replace advice given to you by your health care provider. Make sure you discuss any questions you have with your health care provider.     Document Released: 06/05/2009 Document Revised: 12/23/2014 Document Reviewed: 10/10/2014  [employer] Interactive Patient Education ©2016 [employer]  Heart Failure  Heart failure is a condition in which the heart has trouble pumping blood. This means your heart does not pump blood efficiently for your body to work well. In some  cases of heart failure, fluid may back up into your lungs or you may have swelling (edema) in your lower legs. Heart failure is usually a long-term (chronic) condition. It is important for you to take good care of yourself and follow your health care provider's treatment plan.  CAUSES   Some health conditions can cause heart failure. Those health conditions include:  · High blood pressure (hypertension). Hypertension causes the heart muscle to work harder than normal. When pressure in the blood vessels is high, the heart needs to pump (contract) with more force in order to circulate blood throughout the body. High blood pressure eventually causes the heart to become stiff and weak.  · Coronary artery disease (CAD). CAD is the buildup of cholesterol and fat (plaque) in the arteries of the heart. The blockage in the arteries deprives the heart muscle of oxygen and blood. This can cause chest pain and may lead to a heart attack. High blood pressure can also contribute to CAD.  · Heart attack (myocardial infarction). A heart attack occurs when one or more arteries in the heart become blocked. The loss of oxygen damages the muscle tissue of the heart. When this happens, part of the heart muscle dies. The injured tissue does not contract as well and weakens the heart's ability to pump blood.  · Abnormal heart valves. When the heart valves do not open and close properly, it can cause heart failure. This makes the heart muscle pump harder to keep the blood flowing.  · Heart muscle disease (cardiomyopathy or myocarditis). Heart muscle disease is damage to the heart muscle from a variety of causes. These can include drug or alcohol abuse, infections, or unknown reasons. These can increase the risk of heart failure.  · Lung disease. Lung disease makes the heart work harder because the lungs do not work properly. This can cause a strain on the heart, leading it to fail.  · Diabetes. Diabetes increases the risk of heart failure.  High blood sugar contributes to high fat (lipid) levels in the blood. Diabetes can also cause slow damage to tiny blood vessels that carry important nutrients to the heart muscle. When the heart does not get enough oxygen and food, it can cause the heart to become weak and stiff. This leads to a heart that does not contract efficiently.  · Other conditions can contribute to heart failure. These include abnormal heart rhythms, thyroid problems, and low blood counts (anemia).  Certain unhealthy behaviors can increase the risk of heart failure, including:  · Being overweight.  · Smoking or chewing tobacco.  · Eating foods high in fat and cholesterol.  · Abusing illicit drugs or alcohol.  · Lacking physical activity.  SYMPTOMS   Heart failure symptoms may vary and can be hard to detect. Symptoms may include:  · Shortness of breath with activity, such as climbing stairs.  · Persistent cough.  · Swelling of the feet, ankles, legs, or abdomen.  · Unexplained weight gain.  · Difficulty breathing when lying flat (orthopnea).  · Waking from sleep because of the need to sit up and get more air.  · Rapid heartbeat.  · Fatigue and loss of energy.  · Feeling light-headed, dizzy, or close to fainting.  · Loss of appetite.  · Nausea.  · Increased urination during the night (nocturia).  DIAGNOSIS   A diagnosis of heart failure is based on your history, symptoms, physical examination, and diagnostic tests. Diagnostic tests for heart failure may include:  · Echocardiography.  · Electrocardiography.  · Chest X-ray.  · Blood tests.  · Exercise stress test.  · Cardiac angiography.  · Radionuclide scans.  TREATMENT   Treatment is aimed at managing the symptoms of heart failure. Medicines, behavioral changes, or surgical intervention may be necessary to treat heart failure.  · Medicines to help treat heart failure may include:  ¨ Angiotensin-converting enzyme (ACE) inhibitors. This type of medicine blocks the effects of a blood protein  called angiotensin-converting enzyme. ACE inhibitors relax (dilate) the blood vessels and help lower blood pressure.  ¨ Angiotensin receptor blockers (ARBs). This type of medicine blocks the actions of a blood protein called angiotensin. Angiotensin receptor blockers dilate the blood vessels and help lower blood pressure.  ¨ Water pills (diuretics). Diuretics cause the kidneys to remove salt and water from the blood. The extra fluid is removed through urination. This loss of extra fluid lowers the volume of blood the heart pumps.  ¨ Beta blockers. These prevent the heart from beating too fast and improve heart muscle strength.  ¨ Digitalis. This increases the force of the heartbeat.  · Healthy behavior changes include:  ¨ Obtaining and maintaining a healthy weight.  ¨ Stopping smoking or chewing tobacco.  ¨ Eating heart-healthy foods.  ¨ Limiting or avoiding alcohol.  ¨ Stopping illicit drug use.  ¨ Physical activity as directed by your health care provider.  · Surgical treatment for heart failure may include:  ¨ A procedure to open blocked arteries, repair damaged heart valves, or remove damaged heart muscle tissue.  ¨ A pacemaker to improve heart muscle function and control certain abnormal heart rhythms.  ¨ An internal cardioverter defibrillator to treat certain serious abnormal heart rhythms.  ¨ A left ventricular assist device (LVAD) to assist the pumping ability of the heart.  HOME CARE INSTRUCTIONS   · Take medicines only as directed by your health care provider. Medicines are important in reducing the workload of your heart, slowing the progression of heart failure, and improving your symptoms.  ¨ Do not stop taking your medicine unless directed by your health care provider.  ¨ Do not skip any dose of medicine.  ¨ Refill your prescriptions before you run out of medicine. Your medicines are needed every day.  · Engage in moderate physical activity if directed by your health care provider. Moderate physical  activity can benefit some people. The elderly and people with severe heart failure should consult with a health care provider for physical activity recommendations.  · Eat heart-healthy foods. Food choices should be free of trans fat and low in saturated fat, cholesterol, and salt (sodium). Healthy choices include fresh or frozen fruits and vegetables, fish, lean meats, legumes, fat-free or low-fat dairy products, and whole grain or high fiber foods. Talk to a dietitian to learn more about heart-healthy foods.  · Limit sodium if directed by your health care provider. Sodium restriction may reduce symptoms of heart failure in some people. Talk to a dietitian to learn more about heart-healthy seasonings.  · Use healthy cooking methods. Healthy cooking methods include roasting, grilling, broiling, baking, poaching, steaming, or stir-frying. Talk to a dietitian to learn more about healthy cooking methods.  · Limit fluids if directed by your health care provider. Fluid restriction may reduce symptoms of heart failure in some people.  · Weigh yourself every day. Daily weights are important in the early recognition of excess fluid. You should weigh yourself every morning after you urinate and before you eat breakfast. Wear the same amount of clothing each time you weigh yourself. Record your daily weight. Provide your health care provider with your weight record.  · Monitor and record your blood pressure if directed by your health care provider.  · Check your pulse if directed by your health care provider.  · Lose weight if directed by your health care provider. Weight loss may reduce symptoms of heart failure in some people.  · Stop smoking or chewing tobacco. Nicotine makes your heart work harder by causing your blood vessels to constrict. Do not use nicotine gum or patches before talking to your health care provider.  · Keep all follow-up visits as directed by your health care provider. This is important.  · Limit  alcohol intake to no more than 1 drink per day for nonpregnant women and 2 drinks per day for men. One drink equals 12 ounces of beer, 5 ounces of wine, or 1½ ounces of hard liquor. Drinking more than that is harmful to your heart. Tell your health care provider if you drink alcohol several times a week. Talk with your health care provider about whether alcohol is safe for you. If your heart has already been damaged by alcohol or you have severe heart failure, drinking alcohol should be stopped completely.  · Stop illicit drug use.  · Stay up-to-date with immunizations. It is especially important to prevent respiratory infections through current pneumococcal and influenza immunizations.  · Manage other health conditions such as hypertension, diabetes, thyroid disease, or abnormal heart rhythms as directed by your health care provider.  · Learn to manage stress.  · Plan rest periods when fatigued.  · Learn strategies to manage high temperatures. If the weather is extremely hot:  ¨ Avoid vigorous physical activity.  ¨ Use air conditioning or fans or seek a cooler location.  ¨ Avoid caffeine and alcohol.  ¨ Wear loose-fitting, lightweight, and light-colored clothing.  · Learn strategies to manage cold temperatures. If the weather is extremely cold:  ¨ Avoid vigorous physical activity.  ¨ Layer clothes.  ¨ Wear mittens or gloves, a hat, and a scarf when going outside.  ¨ Avoid alcohol.  · Obtain ongoing education and support as needed.  · Participate in or seek rehabilitation as needed to maintain or improve independence and quality of life.  SEEK MEDICAL CARE IF:   · You have a rapid weight gain.  · You have increasing shortness of breath that is unusual for you.  · You are unable to participate in your usual physical activities.  · You tire easily.  · You cough more than normal, especially with physical activity.  · You have any or more swelling in areas such as your hands, feet, ankles, or abdomen.  · You are unable  to sleep because it is hard to breathe.  · You feel like your heart is beating fast (palpitations).  · You become dizzy or light-headed upon standing up.  SEEK IMMEDIATE MEDICAL CARE IF:   · You have difficulty breathing.  · There is a change in mental status such as decreased alertness or difficulty with concentration.  · You have a pain or discomfort in your chest.  · You have an episode of fainting (syncope).  MAKE SURE YOU:   · Understand these instructions.  · Will watch your condition.  · Will get help right away if you are not doing well or get worse.     This information is not intended to replace advice given to you by your health care provider. Make sure you discuss any questions you have with your health care provider.     Document Released: 12/18/2006 Document Revised: 05/03/2016 Document Reviewed: 01/17/2014  [employer] Interactive Patient Education ©2016 [employer]

## 2017-06-04 LAB
BACTERIA UR CULT: ABNORMAL
BACTERIA UR CULT: ABNORMAL
SIGNIFICANT IND 70042: ABNORMAL
SITE SITE: ABNORMAL
SOURCE SOURCE: ABNORMAL

## 2017-06-04 NOTE — ED NOTES
ED Positive Culture Follow-up/Notification Note:    Date: 6/4/17     Patient seen in the ED on 6/2/2017 for weight gain. Referred by cardiologist due to concern for worsening CHF.     1. Chronic congestive heart failure, unspecified congestive heart failure type (CMS-HCC)    2. Pedal edema       Discharge Medication List as of 6/2/2017  7:16 PM          Allergies: Tetracaine hcl; Vancomycin; and Vigamox     Final cultures:   Results     Procedure Component Value Units Date/Time    URINE CULTURE(NEW) [[id number]]  (Abnormal)  (Susceptibility) Collected:  06/02/17 1700    Order Status:  Completed Specimen Information:  Urine Updated:  06/04/17 0913     Significant Indicator POS (POS)      Source UR      Site --      Urine Culture -- (A)      Urine Culture -- (A)      Result:        Escherichia coli  >100,000 cfu/mL      Culture & Susceptibility     ESCHERICHIA COLI     Antibiotic Sensitivity Microscan Unit Status    Ampicillin Sensitive <=8 mcg/mL Final    Cefepime Sensitive <=8 mcg/mL Final    Cefotaxime Sensitive <=2 mcg/mL Final    Cefotetan Sensitive <=16 mcg/mL Final    Ceftazidime Sensitive <=1 mcg/mL Final    Ceftriaxone Sensitive <=8 mcg/mL Final    Cefuroxime Sensitive <=4 mcg/mL Final    Cephalothin Sensitive <=8 mcg/mL Final    Ciprofloxacin Sensitive <=1 mcg/mL Final    Gentamicin Sensitive <=4 mcg/mL Final    Levofloxacin Sensitive <=2 mcg/mL Final    Nitrofurantoin Sensitive <=32 mcg/mL Final    Pip/Tazobactam Sensitive <=16 mcg/mL Final    Piperacillin Sensitive <=16 mcg/mL Final    Tigecycline Sensitive <=2 mcg/mL Final    Tobramycin Sensitive <=4 mcg/mL Final    Trimeth/Sulfa Sensitive <=2/38 mcg/mL Final                       URINALYSIS,CULTURE IF INDICATED [[id number]]  (Abnormal) Collected:  06/02/17 1700    Order Status:  Completed Specimen Information:  Urine Updated:  06/02/17 1731     Micro Urine Req Microscopic      Color Colorless      Character Clear      Specific Gravity 1.004      Ph 6.5       Glucose Negative mg/dL      Ketones Negative mg/dL      Protein Negative mg/dL      Bilirubin Negative      Nitrite Negative      Leukocyte Esterase Trace (A)      Occult Blood Negative      Culture Indicated Yes UA Culture           Plan:   Urine culture grew E coli.  Patient did not report urinary symptoms. Culture result represents asymptomatic bacteriuria and does not require treatment.    Damaso Darling

## 2017-07-17 ENCOUNTER — PATIENT OUTREACH (OUTPATIENT)
Dept: HEALTH INFORMATION MANAGEMENT | Facility: OTHER | Age: 82
End: 2017-07-17

## 2017-07-19 ENCOUNTER — TELEPHONE (OUTPATIENT)
Dept: MEDICAL GROUP | Facility: MEDICAL CENTER | Age: 82
End: 2017-07-19

## 2017-07-19 NOTE — Clinical Note
Request for Medical Records    Patient Name: Ana Torres    : 1935      Dear Doctor: Murtaza Casey,     The above named patient receives primary care at the [facility] by Rich Coffey M.D..  The patient informs us that you are her eye care Provider.    Please fax a copy of the most recent eye exam to [phone] or answer the  questions below and fax this sheet back to us at the above number.  Attached is a signed Release of Information.      Date of last eye exam: _____________    Retinal eye exam summary:        Please select the choice(s) that apply.    ____ No diabetic retinopathy    ____    Diabetic retinopathy present      Printed Name and Credentials: __________________________________    Signature of Eye Care Provider: _________________________________    We appreciate your assistance and collaboration in providing efficient patient care!    Kindest Regards,    [facility] [address] [place] [address] [phone]

## 2017-07-19 NOTE — LETTER
Request for Medical Records    Patient Name: Ana Torres    : 1935      Dear Doctor Carmela:    The above named patient receives primary care at the [facility] by Rich Coffey M.D..  The patient informs us that you are her eye care Provider.    Please fax a copy of the most recent eye exam to [phone] or answer the  questions below and fax this sheet back to us at the above number.  Attached is a signed Release of Information.      Date of last eye exam: _____________    Retinal eye exam summary:        Please select the choice(s) that apply.    ____ No diabetic retinopathy    ____    Diabetic retinopathy present      Printed Name and Credentials: __________________________________    Signature of Eye Care Provider: _________________________________    We appreciate your assistance and collaboration in providing efficient patient care!    Kindest Regards,    [facility] [address]  [address] [facility]  [place] [address] [phone]

## 2017-07-19 NOTE — LETTER
Critical [facility] hospital  Rich Coffey M.D.  [address] [facility] [address]  [facility] [address]  Fax: [phone]   Authorization for Release/Disclosure of   Protected Health Information   Name: ANA PINZON : 1935 SSN: [id number]   Address: [address] [place] [address] Phone:    [phone] (home)    I authorize the entity listed below to release/disclose the PHI below to:   Critical access hospital/Rich Coffey M.D. and Rich Coffey M.D.   Provider or Entity Name: Dr Casey     [facility]   Phone:      Fax:  [phone]   Reason for request: continuity of care   Information to be released:    [  ] LAST COLONOSCOPY,  including any PATH REPORT and follow-up  [  ] LAST FIT/COLOGUARD RESULT [  ] LAST DEXA  [  ] LAST MAMMOGRAM  [  ] LAST PAP  [  ] LAST LABS [x  ] RETINA EXAM REPORT  [  ] IMMUNIZATION RECORDS  [  ] Release all info      [  ] Check here and initial the line next to each item to release ALL health information INCLUDING  _____ Care and treatment for drug and / or alcohol abuse  _____ HIV testing, infection status, or AIDS  _____ Genetic Testing    DATES OF SERVICE OR TIME PERIOD TO BE DISCLOSED: _____________  I understand and acknowledge that:  * This Authorization may be revoked at any time by you in writing, except if your health information has already been used or disclosed.  * Your health information that will be used or disclosed as a result of you signing this authorization could be re-disclosed by the recipient. If this occurs, your re-disclosed health information may no longer be protected by State or Federal laws.  * You may refuse to sign this Authorization. Your refusal will not affect your ability to obtain treatment.  * This Authorization becomes effective upon signing and will  on (date) __________.      If no date is indicated, this Authorization will  one (1) year from the signature date.    Name: Ana Pinzon    Signature:  continuity of care   Date:     7/28/2021       PLEASE FAX REQUESTED RECORDS BACK TO: [phone]

## 2017-07-19 NOTE — LETTER
[facility]  Rich Coffey M.D.  [address]  [place] [address]  Fax: [phone]   Authorization for Release/Disclosure of   Protected Health Information   Name: MORRO PINZON : 1935 SSN: [id number]   Address: [address] [place] [address] [place] [address] Phone:    [phone] (home)    I authorize the entity listed below to release/disclose the PHI below to:   [facility]/Rich Coffey M.D. and Rich Coffey M.D.   Provider or Entity Name:                                                Dr Simmons   [facility]   Phone:      Fax:  [phone]   Reason for request: continuity of care   Information to be released:    [  ] LAST COLONOSCOPY,  including any PATH REPORT and follow-up  [  ] LAST FIT/COLOGUARD RESULT [  ] LAST DEXA  [  ] LAST MAMMOGRAM  [  ] LAST PAP  [  ] LAST LABS [x  ] RETINA EXAM REPORT  [  ] IMMUNIZATION RECORDS  [ X] Release all info - DOS 3/15/2017      [  ] Check here and initial the line next to each item to release ALL health information INCLUDING  _____ Care and treatment for drug and / or alcohol abuse  _____ HIV testing, infection status, or AIDS  _____ Genetic Testing    DATES OF SERVICE OR TIME PERIOD TO BE DISCLOSED: _____________  I understand and acknowledge that:  * This Authorization may be revoked at any time by you in writing, except if your health information has already been used or disclosed.  * Your health information that will be used or disclosed as a result of you signing this authorization could be re-disclosed by the recipient. If this occurs, your re-disclosed health information may no longer be protected by State or Federal laws.  * You may refuse to sign this Authorization. Your refusal will not affect your ability to obtain treatment.  * This Authorization becomes effective upon signing and will  on (date) __________.      If no date is indicated, this Authorization will  one (1) year  from the signature date.    Name: Ana Uriostegui Sabasjess    Signature:  continuity of care   Date:     7/28/2021     PLEASE FAX REQUESTED RECORDS BACK TO: [phone]

## 2017-07-19 NOTE — TELEPHONE ENCOUNTER
Future Appointments       Provider Department Center    7/25/2017 10:20 AM Rich Coffey M.D.; [place] [facility]  [address]          ANNUAL WELLNESS VISIT PRE-VISIT PLANNING     1.  Reviewed note from last office visit with PCP: YES Last office visit: 03/20/17    2.  If any orders were placed at last visit, do we have Results/Consult Notes?        •  Labs - Labs ordered, completed and results are in chart. 06/02/17       •  Imaging - Imaging ordered, completed and results are in chart. 06/02/17       •  Referrals - No referrals were ordered at last office visit.     3.  Immunizations were updated in [employer] using [employer]?: Yes       •  Web[employer] Recommendations: HEPATITIS A  and TDAP       •  Is patient due for Tdap? YES. Patient was notified of copay.       •  Is patient due for Shingles? NO     4.  Patient is due for the following Health Maintenance Topics:   Health Maintenance Due   Topic Date Due   • Annual Wellness Visit  SCHEDULED FOR 07/25/17   • BONE DENSITY  NEEDS ORDERS   • RETINAL SCREENING  REQUESTING RECORDS   • URINE ACR / MICROALBUMIN  02/22/2017       5.  Reviewed/Updated the following with patient:       •   Preferred Pharmacy? YES       •   Preferred Lab? YES       •   Medications? YES. Was Abstract Encounter opened and chart updated? YES       •   Social History? YES. Was Abstract Encounter opened and chart updated? YES       •   Family History? YES. Was Abstract Encounter opened and chart updated? YES    6.  Care Team Updated:       •   DME Company (gait device, O2, CPAP, etc.): YES       •   Other Specialists (eye doctor, derm, GYN, cardiology, endo, etc): YES       •   Last eye exam: 1 year    7. DPA/Advanced Directive:  Patient has Advanced Directive, but it is not on file. Instructed to bring in a copy to scan into their chart.    8.  Patient has the following Care Path diagnoses on Problem List:  DIABETES    Has patient ever had diabetes education? Yes,  and patient is interested in more. Referral pended.    CHF    1.  Date of last echo: 02/10/17 45%  2.  Has patient ever had education regarding CHF? Yes, and is NOT interested in more at this time.  3.  Is patient under the care of a cardiologist? Yes, CareTeam was updated.      9.  Specialty Comments was updated with diagnosis information provided by SCP: YES there is a note    10.  Patient was advised: “This is a free wellness visit. The provider will screen for medical conditions to help you stay healthy. If you have other concerns to address you may be asked to discuss these at a separate visit or there may be an additional fee.”     11.  Patient was informed to arrive 15 min prior to their scheduled appointment and bring in their medication bottles?  YES

## 2017-07-19 NOTE — Clinical Note
[facility]  Rich Coffey M.D.  [address]  [place] [address]  Fax: [phone]   Authorization for Release/Disclosure of   Protected Health Information   Name: ANA PINZON : 1935 SSN: [id number]   Address: [address] [place] [address] Phone:    [phone] (home)    I authorize the entity listed below to release/disclose the PHI below to:   [facility]/Rich Coffey M.D. and Rich Coffey M.D.   Provider or Entity Name: Murtaza Casey M.D.   Address   [facility]   Phone:    Fax: [phone]   Reason for request: continuity of care   Information to be released:    [  ] LAST COLONOSCOPY,  including any PATH REPORT and follow-up  [  ] LAST FIT/COLOGUARD RESULT [  ] LAST DEXA  [  ] LAST MAMMOGRAM  [  ] LAST PAP  [  ] LAST LABS [x] RETINA EXAM REPORT  [  ] IMMUNIZATION RECORDS  [  ] Release all info      [  ] Check here and initial the line next to each item to release ALL health information INCLUDING  _____ Care and treatment for drug and / or alcohol abuse  _____ HIV testing, infection status, or AIDS  _____ Genetic Testing    DATES OF SERVICE OR TIME PERIOD TO BE DISCLOSED: _____________  I understand and acknowledge that:  * This Authorization may be revoked at any time by you in writing, except if your health information has already been used or disclosed.  * Your health information that will be used or disclosed as a result of you signing this authorization could be re-disclosed by the recipient. If this occurs, your re-disclosed health information may no longer be protected by State or Federal laws.  * You may refuse to sign this Authorization. Your refusal will not affect your ability to obtain treatment.  * This Authorization becomes effective upon signing and will  on (date) __________.      If no date is indicated, this Authorization will  one (1) year from the signature date.    Name: Ana Uriostegui  Melissa    Signature:                   Date: 6/25/2018       PLEASE FAX REQUESTED RECORDS BACK TO: [phone]

## 2017-07-25 ENCOUNTER — TELEPHONE (OUTPATIENT)
Dept: MEDICAL GROUP | Facility: MEDICAL CENTER | Age: 82
End: 2017-07-25

## 2017-07-25 ENCOUNTER — OFFICE VISIT (OUTPATIENT)
Dept: MEDICAL GROUP | Facility: MEDICAL CENTER | Age: 82
End: 2017-07-25
Payer: MEDICARE

## 2017-07-25 VITALS
DIASTOLIC BLOOD PRESSURE: 46 MMHG | OXYGEN SATURATION: 96 % | SYSTOLIC BLOOD PRESSURE: 102 MMHG | WEIGHT: 127.09 LBS | TEMPERATURE: 98 F | HEIGHT: 58 IN | HEART RATE: 52 BPM | RESPIRATION RATE: 16 BRPM | BODY MASS INDEX: 26.68 KG/M2

## 2017-07-25 DIAGNOSIS — Z00.00 HEALTH CARE MAINTENANCE: ICD-10-CM

## 2017-07-25 DIAGNOSIS — I50.22 CHRONIC SYSTOLIC CONGESTIVE HEART FAILURE (HCC): ICD-10-CM

## 2017-07-25 DIAGNOSIS — Z00.00 MEDICARE ANNUAL WELLNESS VISIT, INITIAL: ICD-10-CM

## 2017-07-25 DIAGNOSIS — Z78.0 MENOPAUSE: ICD-10-CM

## 2017-07-25 DIAGNOSIS — I48.0 PAROXYSMAL ATRIAL FIBRILLATION (HCC): ICD-10-CM

## 2017-07-25 DIAGNOSIS — E78.5 HYPERLIPIDEMIA, UNSPECIFIED HYPERLIPIDEMIA TYPE: ICD-10-CM

## 2017-07-25 DIAGNOSIS — N18.3 CKD (CHRONIC KIDNEY DISEASE), STAGE 3 (MODERATE): ICD-10-CM

## 2017-07-25 DIAGNOSIS — E11.9 DIABETES MELLITUS TYPE 2 IN NONOBESE (HCC): ICD-10-CM

## 2017-07-25 DIAGNOSIS — I10 HTN (HYPERTENSION), BENIGN: ICD-10-CM

## 2017-07-25 PROCEDURE — G0438 PPPS, INITIAL VISIT: HCPCS | Performed by: FAMILY MEDICINE

## 2017-07-25 RX ORDER — LISINOPRIL 20 MG/1
20 TABLET ORAL DAILY
COMMUNITY
End: 2020-07-20 | Stop reason: SDUPTHER

## 2017-07-25 ASSESSMENT — PAIN SCALES - GENERAL: PAINLEVEL: NO PAIN

## 2017-07-25 ASSESSMENT — PATIENT HEALTH QUESTIONNAIRE - PHQ9: CLINICAL INTERPRETATION OF PHQ2 SCORE: 0

## 2017-07-25 NOTE — PROGRESS NOTES
Chief Complaint   Patient presents with   • Annual Wellness Visit         HPI:  Ana is a 82 y.o. here for Medicare Annual Wellness Visit    Patient Active Problem List    Diagnosis Date Noted   • Physical deconditioning 03/24/2016     Priority: High   • Acute kidney injury (CMS-HCC) 03/24/2016     Priority: High   • Generalized weakness 03/19/2016     Priority: High   • Nephritis and nephropathy, not specified as acute or chronic, with other specified pathological lesion in kidney, in diseases classified elsewhere 09/08/2014     Priority: High   • Acute renal failure superimposed on stage 3 chronic kidney disease (CMS-HCC) 03/19/2016     Priority: Medium   • Hyponatremia 03/18/2016     Priority: Medium   • Aortic atherosclerosis (CMS-HCC) 04/28/2015     Priority: Medium   • CKD (chronic kidney disease) stage 3, GFR 30-59 ml/min 10/07/2014     Priority: Medium   • Peripheral neuropathy 09/10/2009     Priority: Medium   • HTN (hypertension), benign      Priority: Medium   • CHF (congestive heart failure) (CMS-HCC)      Priority: Medium   • DJD (degenerative joint disease)      Priority: Medium   • Hyperlipidemia      Priority: Low   • CAD (coronary artery disease)      Priority: Low   • Type 2 diabetes mellitus without complication, with long-term current use of insulin ([facility]) 03/20/2017   • HTN (hypertension) 11/23/2016   • DM type 2 (diabetes mellitus, type 2) (CMS-HCC) 11/23/2016   • Syncope 11/22/2016   • Acute systolic congestive heart failure, NYHA class 2 (CMS-HCC) 11/22/2016   • 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   • Aspiration into airway 07/18/2016   • Acute respiratory failure (CMS-HCC) 07/18/2016   • Lower urinary tract infectious disease 07/17/2016   • PAF (paroxysmal atrial fibrillation)-probably reason for Eliquis 07/17/2016   • Leukocytosis 07/16/2016   • Presbyesophagus 07/16/2016   • Hypoglycemia associated with diabetes (CMS-HCC) 07/15/2016   • Medical home 03/25/2016   • Encounter for long-term  (current) use of insulin (CMS-[facility]) 09/08/2014   • Renal failure 01/09/2013   • Cystocele        Current Outpatient Prescriptions   Medication Sig Dispense Refill   • lisinopril (PRINIVIL) 20 MG Tab Take 20 mg by mouth every day.     • furosemide (LASIX) 40 MG Tab Take 1.5 Tabs by mouth every day. (Patient taking differently: Take 40 mg by mouth 2 Times a Day.) 30 Tab    • insulin glargine (LANTUS) 100 UNIT/ML Solution Inject 10 Units as instructed every evening.     • apixaban (ELIQUIS) 5mg Tab Take 1 Tab by mouth 2 Times a Day. (Patient taking differently: Take 2.5 mg by mouth 2 Times a Day.) 60 Tab 11   • carvedilol (COREG) 25 MG Tab Take 1 Tab by mouth 2 times a day, with meals. 60 Tab 2   • atorvastatin (LIPITOR) 80 MG tablet Take 1 Tab by mouth every morning. 90 Tab 1   • spironolactone (ALDACTONE) 25 MG Tab Take 1 Tab by mouth every day. 30 Tab 3   • docusate sodium (COLACE) 100 MG Cap Take 100 mg by mouth 2 times a day as needed for Constipation. Take 1 capsule by mouth twice daily as needed for constipation for up to 15 days.     • multivitamin (THERAGRAN) Tab Take 1 Tab by mouth every day. 30 Tab 1   • amlodipine (NORVASC) 5 MG Tab Take 1 Tab by mouth every day. (Patient not taking: Reported on 7/25/2017) 90 Tab 1     No current facility-administered medications for this visit.        Patient is taking medications as noted in medication list.  Current supplements as per medication list.     Allergies: Tetracaine hcl; Vancomycin; and Vigamox    Current social contact/activities: Visit with family, does all her own household choirs       Is patient current with immunizations? No, due for TDAP. Patient is interested in receiving NONE today.    She  reports that she has never smoked. She has never used smokeless tobacco. She reports that she does not drink alcohol or use illicit drugs.  Counseling given: Not Answered        DPA/Advanced directive: Patient has Advanced Directive, but it is not on file.  Instructed to bring in a copy to scan into their chart.    ROS:    Gait: Uses a cane and 4 wheel walker with seat     Ostomy: no     Other tubes: no     Amputations: no     Chronic oxygen use no     Last eye exam 1 year     Wears hearing aids: no     : Reports incontinence.       Screening:    DIABETES    Has patient ever had diabetes education? Yes, and patient is interested in more. Referral pended.    CHF    1.  Date of last echo: 02/10/17 45%  2.  Has patient ever had education regarding CHF? Yes, and is NOT interested in more at this time.  3.  Is patient under the care of a cardiologist? Yes, CareTeam was updated.    Depression Screening    Little interest or pleasure in doing things?  0 - not at all  Feeling down, depressed, or hopeless? 0 - not at all  Trouble falling or staying asleep, or sleeping too much?     Feeling tired or having little energy?     Poor appetite or overeating?     Feeling bad about yourself - or that you are a failure or have let yourself or your family down?    Trouble concentrating on things, such as reading the newspaper or watching television?    Moving or speaking so slowly that other people could have noticed.  Or the opposite - being so fidgety or restless that you have been moving around a lot more than usual?     Thoughts that you would be better off dead, or of hurting yourself?     Patient Health Questionnaire Score:        If depressive symptoms identified deferred to follow up visit unless specifically addressed in assessment and plan.    Interpretation of PHQ-9 Total Score   Score Severity   1-4 No Depression   5-9 Mild Depression   10-14 Moderate Depression   15-19 Moderately Severe Depression   20-27 Severe Depression      Screening for Cognitive Impairment    Three Minute Recall (apple, watch, vira)  2/3    Draw clock face with all 12 numbers set to the hand to show 10 minutes past 11 o'clock  1 4/5  If cognitive concerns identified, deferred for follow up unless  specifically addressed in assessment and plan.    Fall Risk Assessment    Has the patient had two or more falls in the last year or any fall with injury in the last year?  No  If fall risk identified, deferred for follow up unless specifically addressed in assessment and plan.      Safety Assessment    Throw rugs on floor.  No  Handrails on all stairs.  Yes  Good lighting in all hallways.  Yes  Difficulty hearing.  No  Patient counseled about all safety risks that were identified.    Functional Assessment ADLs    Are there any barriers preventing you from cooking for yourself or meeting nutritional needs?  No.    Are there any barriers preventing you from driving safely or obtaining transportation?  No.    Are there any barriers preventing you from using a telephone or calling for help?  No.    Are there any barriers preventing you from shopping?  No.    Are there any barriers preventing you from taking care of your own finances?  No.    Are there any barriers preventing you from managing your medications?  No.    Are you currently engaging any exercise or physical activity?  Yes.       Health Maintenance Summary                Annual Wellness Visit Overdue 1935     BONE DENSITY Overdue 1/23/2013      Done 1/23/2008 DS-BONE DENSITY STUDY (DEXA)     Patient has more history with this topic...    RETINAL SCREENING Overdue 4/6/2016      Done 4/6/2015 REFERRAL FOR RETINAL SCREENING EXAM     Patient has more history with this topic...    URINE ACR / MICROALBUMIN Overdue 2/22/2017      Done 2/22/2016 PROTEIN/CREAT RATIO URINE (A)     Patient has more history with this topic...    IMM DTaP/Tdap/Td Vaccine Postponed 11/23/2017 Originally 6/22/1954. Patient Refused    A1C SCREENING Next Due 8/1/2017      Done 2/1/2017 POCT A1C     Patient has more history with this topic...    IMM INFLUENZA Next Due 9/1/2017     FASTING LIPID PROFILE Next Due 2/8/2018      Done 2/8/2017 LIPID PROFILE     Patient has more history with  "this topic...    DIABETES MONOFILAMENT / LE EXAM Next Due 3/20/2018      Done 3/20/2017 AMB DIABETIC MONOFILAMENT LOWER EXTREMITY EXAM     Patient has more history with this topic...    SERUM CREATININE Next Due 6/2/2018      Done 6/2/2017 COMP METABOLIC PANEL (A)     Patient has more history with this topic...          Patient Care Team:  Rich Coffey M.D. as PCP - General (Geriatrics)  Adryan Simmons M.D. (Cardiology)  Murtaza Casey M.D. as Consulting Physician (Ophthalmology)      Social History   Substance Use Topics   • Smoking status: Never Smoker    • Smokeless tobacco: Never Used   • Alcohol Use: No     Family History   Problem Relation Age of Onset   • Hypertension Mother    • Heart Disease Mother    • Heart Disease Father      She  has a past medical history of HTN (hypertension), benign; Hyperlipidemia; CAD (coronary artery disease); Cystocele; PVD (peripheral vascular disease) (CMS-HCC); DJD (degenerative joint disease); CHF (congestive heart failure) (CMS-HCC); Unspecified urinary incontinence; Urinary bladder disorder; Diabetes; CATARACT; Renal disorder; Myocardial infarct (CMS-HCC) (2000); Medical home; and Hypertension. She also has no past medical history of Breast cancer (CMS-HCC).   Past Surgical History   Procedure Laterality Date   • Abdominal hysterectomy total     • Multiple coronary artery bypass  2004   • Cataract phaco with iol  3/12/2014     Performed by Murtaza Casey M.D. at SURGERY SAME DAY [facility]S         Exam:     Blood pressure 102/46, pulse 52, temperature 36.7 °C (98 °F), resp. rate 16, height 1.461 m (4' 9.5\"), weight 57.65 kg (127 lb 1.5 oz), SpO2 96 %. Body mass index is 27.01 kg/(m^2).    Hearing, good.  Dentition , good  Alert, oriented in no acute distress.  Eye contact is good, speech goal directed, affect calm    Assessment and Plan. The following treatment and monitoring plan is recommended:    82 y.o. female     1. Diabetes mellitus type 2 in " nonobese (CMS-HCC)  Adequately controlled. Last A1C was 7.  Continue on Lantus 10 units in PM, patient has h/o multiple hypoglycemic episodes so Novolog was stopped . Will check A1C    .- Initial Wellness Visit - Includes PPPS ()  - HEMOGLOBIN A1C; Future  - MICROALBUMIN CREAT RATIO URINE (LAB COLLECT); Future  - POCT Retinal Eye Exam    2. HTN (hypertension), benign  Has been adequately controlled on current medication. Denies headache, chest pain, and SOB.  Continue on Amlodipine 5 mg, carvedilol 25 mg bid    - Initial Wellness Visit - Includes PPPS ()    3. Hyperlipidemia, unspecified hyperlipidemia type  He has been tolerating the statin. Denies muscle pain LFTs has been normal  Continue on Lipitor 80 mg daily.    - Initial Wellness Visit - Includes PPPS ()    4. Chronic systolic congestive heart failure (CMS-HCC)  Last echo was done recently showed EF of 45 %.RVSP was 35 mmHg.( improved , EF was 35 %)  Currently she is on medical treatment.  Continue Lasix from 40 mg twice daily, and spironolactone 25 mg daily  She follows up with cardiology Dr Simmons.    - Initial Wellness Visit - Includes PPPS ()    5. Paroxysmal atrial fibrillation (CMS-HCC)  Stable, asymptomatic.Controlled rate and rhythm.    Continue  on Eliquis     - Initial Wellness Visit - Includes PPPS ()    6. CKD (chronic kidney disease), stage 3 (moderate)  Last eGFR was 34. Asymptomatic.  Could be because of her CHF.  Will continue watch.    - Initial Wellness Visit - Includes PPPS ()    7. Health care maintenance    - Initial Wellness Visit - Includes PPPS ()  - DS-BONE DENSITY STUDY (DEXA); Future    8. Medicare annual wellness visit, initial  Preventive, and chronic medical issues reviewed.    - Initial Wellness Visit - Includes PPPS ()    Health Care Screening recommendations as per orders if indicated.  Referrals offered: PT/OT/Nutrition counseling/Behavioral Health/Smoking cessation as per orders if  indicated.    Discussion today about general wellness and lifestyle habits:    · Prevent falls and reduce trip hazards; Cautioned about securing or removing rugs.  · Have a working fire alarm and carbon monoxide detector;   · Engage in regular physical activity and social activities       Follow-up:3 month.

## 2017-07-25 NOTE — TELEPHONE ENCOUNTER
Patient will go to see her ophthalmologist for her retinal exam.  Her eyes will not focus/dilate enough for the machine in the office.

## 2017-07-25 NOTE — MR AVS SNAPSHOT
"        Ana Uriostegui Melissa   2017 10:20 AM   Office Visit   MRN: [id number]    Department:  [address] [place] [address]   Dept Phone:  [phone]    Description:  Female : 1935   Provider:  Rich Coffey M.D.; HEALTH            Reason for Visit     Annual Wellness Visit           Allergies as of 2017     Allergen Noted Reactions    Tetracaine Hcl [Tetcaine] 10/29/2014       Vancomycin 10/29/2014       Vigamox [Moxifloxacin] 10/29/2014         You were diagnosed with     Diabetes mellitus type 2 in nonobese (CMS-HCC)   [302712]       HTN (hypertension), benign   [365538]       Hyperlipidemia, unspecified hyperlipidemia type   [6511878]       Chronic systolic congestive heart failure (CMS-HCC)   [637919]       Paroxysmal atrial fibrillation (CMS-HCC)   [318293]       CKD (chronic kidney disease), stage 3 (moderate)   [9099810]       Health care maintenance   [333743]       Medicare annual wellness visit, initial   [134576]       Menopause   [542444]         Vital Signs     Blood Pressure Pulse Temperature Respirations Height Weight    102/46 mmHg 52 36.7 °C (98 °F) 16 1.461 m (4' 9.5\") 57.65 kg (127 lb 1.5 oz)    Body Mass Index Oxygen Saturation Smoking Status             27.01 kg/m2 96% Never Smoker          Basic Information     Date Of Birth Sex Race Ethnicity Preferred Language    1935 Female White Non- English      Your appointments     Aug 07, 2017 10:20 AM   Diabetes Care Visit with Rich Coffey M.D., JUNIOR DIABETES RN   [address]    [address] [place] [address]  [facility] [id number]   [phone]           You will be receiving a confirmation call a few days before your appointment from our automated call confirmation system.              Problem List              ICD-10-CM Priority Class Noted - Resolved    HTN (hypertension), benign I10 Medium  Unknown - Present    Hyperlipidemia E78.5 Low  Unknown - Present    CAD " (coronary artery disease) I25.10 Low  Unknown - Present    CHF (congestive heart failure) (CMS-HCC) I50.9 Medium  Unknown - Present    DJD (degenerative joint disease) M19.90 Medium  Unknown - Present    Cystocele N81.10   Unknown - Present    Peripheral neuropathy G62.9 Medium  9/10/2009 - Present    Renal failure N19   1/9/2013 - Present    Encounter for long-term (current) use of insulin (CMS-HCC) Z79.4   9/8/2014 - Present    Nephritis and nephropathy, not specified as acute or chronic, with other specified pathological lesion in kidney, in diseases classified elsewhere N05.8 High  9/8/2014 - Present    CKD (chronic kidney disease) stage 3, GFR 30-59 ml/min N18.3 Medium  10/7/2014 - Present    Aortic atherosclerosis (CMS-HCC) I70.0 Medium  4/28/2015 - Present    Hyponatremia E87.1 Medium  3/18/2016 - Present    Generalized weakness R53.1 High  3/19/2016 - Present    Acute renal failure superimposed on stage 3 chronic kidney disease (CMS-HCC) N17.9, N18.3 Medium  3/19/2016 - Present    Physical deconditioning R53.81 High  3/24/2016 - Present    Acute kidney injury (CMS-HCC) N17.9 High  3/24/2016 - Present    Medical home Z78.9   3/25/2016 - Present    Hypoglycemia associated with diabetes (CMS-HCC) E11.649   7/15/2016 - Present    Leukocytosis D72.829   7/16/2016 - Present    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   7/16/2016 - Present    Lower urinary tract infectious disease N39.0   7/17/2016 - Present    PAF (paroxysmal atrial fibrillation)-probably reason for Eliquis I48.0   7/17/2016 - Present    Aspiration into airway T17.908A   7/18/2016 - Present    Acute respiratory failure (CMS-HCC) J96.00   7/18/2016 - Present    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   7/19/2016 - Present    Syncope R55   11/22/2016 - Present    Acute systolic congestive heart failure, NYHA class 2 (CMS-HCC) I50.21   11/22/2016 - Present    HTN (hypertension) I10   11/23/2016 - Present    DM type 2 (diabetes mellitus, type 2) (CMS-HCC) E11.9   11/23/2016 - Present     Type 2 diabetes mellitus without complication, with long-term current use of insulin (HCC) E11.9, Z79.4   3/20/2017 - Present      Health Maintenance        Date Due Completion Dates    BONE DENSITY 1/23/2013 1/23/2008, 1/23/2008    RETINAL SCREENING 4/6/2016 4/6/2015, 3/17/2015 (Done), 4/10/2014    Override on 3/17/2015: Done    URINE ACR / MICROALBUMIN 2/22/2017 2/22/2016, 11/5/2014, 4/29/2014, 7/1/2010    IMM DTaP/Tdap/Td Vaccine (1 - Tdap) 11/23/2017 (Originally 6/22/1954) ---    A1C SCREENING 8/1/2017 2/1/2017, 4/19/2016, 2/3/2016, 8/3/2015, 3/2/2015, 10/29/2014, 9/8/2014, 1/29/2014    IMM INFLUENZA (1) 9/1/2017 ---    FASTING LIPID PROFILE 2/8/2018 2/8/2017, 11/24/2016, 8/10/2015, 11/5/2014, 6/2/2014, 4/4/2014, 7/1/2010    DIABETES MONOFILAMENT / LE EXAM 3/20/2018 3/20/2017, 9/1/2015 (Done), 3/2/2015 (N/S), 3/2/2015 (Done), 3/2/2015 (N/S), 3/2/2015 (N/S), 3/2/2015 (Done), 9/8/2014    Override on 9/1/2015: Done    Override on 3/2/2015: (N/S)    Override on 3/2/2015: Done    Override on 3/2/2015: (N/S)    Override on 3/2/2015: (N/S)    Override on 3/2/2015: Done    SERUM CREATININE 6/2/2018 6/2/2017, 4/19/2017, 2/8/2017, 12/9/2016, 11/26/2016, 11/25/2016, 11/24/2016, 11/22/2016, 7/20/2016, 7/18/2016, 7/17/2016, 7/16/2016, 7/15/2016, 5/16/2016, 4/25/2016, 4/18/2016, 4/14/2016, 4/11/2016, 4/9/2016, 4/5/2016, 4/4/2016, 4/3/2016, 4/2/2016, 4/1/2016, 3/31/2016, 3/30/2016, 3/29/2016, 3/25/2016, 3/24/2016, 3/23/2016, 3/22/2016, 3/21/2016, 3/20/2016, 3/19/2016, 3/18/2016, 3/17/2016, 2/22/2016, 8/10/2015, 2/17/2015, 2/17/2015, 12/15/2014, 11/19/2014, 11/5/2014, 10/11/2014, 10/10/2014, 10/9/2014, 10/8/2014, 10/7/2014, 10/6/2014, 4/29/2014, 4/4/2014, 1/29/2014, 7/1/2010            Current Immunizations     13-VALENT PCV PREVNAR 9/16/2015    Pneumococcal polysaccharide vaccine (PPSV-23) 10/29/2014, 9/6/2009    SHINGLES VACCINE 10/1/2016    Tuberculin Skin Test 6/26/2016, 4/19/2016 10:57 AM      Below and/or attached are the  medications your provider expects you to take. Review all of your home medications and newly ordered medications with your provider and/or pharmacist. Follow medication instructions as directed by your provider and/or pharmacist. Please keep your medication list with you and share with your provider. Update the information when medications are discontinued, doses are changed, or new medications (including over-the-counter products) are added; and carry medication information at all times in the event of emergency situations     Allergies:  TETRACAINE HCL - (reactions not documented)     VANCOMYCIN - (reactions not documented)     VIGAMOX - (reactions not documented)               Medications  Valid as of: July 25, 2017 - 11:26 AM    Generic Name Brand Name Tablet Size Instructions for use    Apixaban (Tab) ELIQUIS 5mg Take 1 Tab by mouth 2 Times a Day.        Atorvastatin Calcium (Tab) LIPITOR 80 MG Take 1 Tab by mouth every morning.        Carvedilol (Tab) COREG 25 MG Take 1 Tab by mouth 2 times a day, with meals.        Docusate Sodium (Cap) COLACE 100 MG Take 100 mg by mouth 2 times a day as needed for Constipation. Take 1 capsule by mouth twice daily as needed for constipation for up to 15 days.        Furosemide (Tab) LASIX 40 MG Take 1.5 Tabs by mouth every day.        Insulin Glargine (Solution) LANTUS 100 UNIT/ML Inject 10 Units as instructed every evening.        Lisinopril (Tab) PRINIVIL 20 MG Take 20 mg by mouth every day.        Multiple Vitamin (Tab) THERAGRAN  Take 1 Tab by mouth every day.        Spironolactone (Tab) ALDACTONE 25 MG Take 1 Tab by mouth every day.        .                 Medicines prescribed today were sent to:     [id number] - [place] - [address]    [address] [facility] [place] [address]    Phone: [phone] Fax: [phone]    Open 24 Hours?: No      Medication refill instructions:       If your prescription bottle indicates you have medication refills  left, it is not necessary to call your provider’s office. Please contact your pharmacy and they will refill your medication.    If your prescription bottle indicates you do not have any refills left, you may request refills at any time through one of the following ways: The online Online Prasad system (except Urgent Care), by calling your provider’s office, or by asking your pharmacy to contact your provider’s office with a refill request. Medication refills are processed only during regular business hours and may not be available until the next business day. Your provider may request additional information or to have a follow-up visit with you prior to refilling your medication.   *Please Note: Medication refills are assigned a new Rx number when refilled electronically. Your pharmacy may indicate that no refills were authorized even though a new prescription for the same medication is available at the pharmacy. Please request the medicine by name with the pharmacy before contacting your provider for a refill.        Your To Do List     Future Labs/Procedures Complete By Expires    DS-BONE DENSITY STUDY (DEXA)  As directed 1/24/2018    HEMOGLOBIN A1C  As directed 7/25/2018    MICROALBUMIN CREAT RATIO URINE (LAB COLLECT)  As directed 7/25/2018      Other Notes About Your Plan     Patient is enrolled in [facility] with Dr. Coffey.    8/3/16--Chart reviewed for case management intervention.  Not appropriate for chronic care management at this time.           Online Prasad Status: Patient Declined

## 2017-08-03 ENCOUNTER — HOSPITAL ENCOUNTER (OUTPATIENT)
Dept: LAB | Facility: MEDICAL CENTER | Age: 82
End: 2017-08-03
Attending: FAMILY MEDICINE
Payer: MEDICARE

## 2017-08-03 DIAGNOSIS — E11.9 DIABETES MELLITUS TYPE 2 IN NONOBESE (HCC): ICD-10-CM

## 2017-08-03 LAB
CREAT UR-MCNC: 53.3 MG/DL
EST. AVERAGE GLUCOSE BLD GHB EST-MCNC: 140 MG/DL
HBA1C MFR BLD: 6.5 % (ref 0–5.6)
MICROALBUMIN UR-MCNC: 0.9 MG/DL
MICROALBUMIN/CREAT UR: 17 MG/G (ref 0–30)

## 2017-08-03 PROCEDURE — 36415 COLL VENOUS BLD VENIPUNCTURE: CPT

## 2017-08-03 PROCEDURE — 82570 ASSAY OF URINE CREATININE: CPT

## 2017-08-03 PROCEDURE — 82043 UR ALBUMIN QUANTITATIVE: CPT

## 2017-08-03 PROCEDURE — 83036 HEMOGLOBIN GLYCOSYLATED A1C: CPT

## 2017-08-07 ENCOUNTER — OFFICE VISIT (OUTPATIENT)
Dept: MEDICAL GROUP | Facility: MEDICAL CENTER | Age: 82
End: 2017-08-07
Payer: MEDICARE

## 2017-08-07 VITALS
WEIGHT: 125 LBS | HEART RATE: 58 BPM | HEIGHT: 58 IN | TEMPERATURE: 97.5 F | OXYGEN SATURATION: 99 % | SYSTOLIC BLOOD PRESSURE: 130 MMHG | BODY MASS INDEX: 26.24 KG/M2 | DIASTOLIC BLOOD PRESSURE: 60 MMHG

## 2017-08-07 DIAGNOSIS — E11.9 DIABETES MELLITUS TYPE 2 IN NONOBESE (HCC): ICD-10-CM

## 2017-08-07 PROCEDURE — 99213 OFFICE O/P EST LOW 20 MIN: CPT | Performed by: FAMILY MEDICINE

## 2017-08-07 NOTE — MR AVS SNAPSHOT
"        Ana Uriostegui Melissa   2017 10:20 AM   Office Visit   MRN: [id number]    Department:  [address] [place] [address]   Dept Phone:  [phone]    Description:  Female : 1935   Provider:  Rich Coffey M.D.; DIABETES RN           Reason for Visit     Diabetes           Allergies as of 2017     Allergen Noted Reactions    Tetracaine Hcl [Tetcaine] 10/29/2014       Vancomycin 10/29/2014       Vigamox [Moxifloxacin] 10/29/2014         You were diagnosed with     Diabetes mellitus type 2 in nonobese (CMS-HCC)   [321084]         Vital Signs     Blood Pressure Pulse Temperature Height Weight Body Mass Index    130/60 mmHg 58 36.4 °C (97.5 °F) 1.461 m (4' 9.5\") 56.7 kg (125 lb) 26.56 kg/m2    Oxygen Saturation Smoking Status                99% Never Smoker           Basic Information     Date Of Birth Sex Race Ethnicity Preferred Language    1935 Female White Non- English      Problem List              ICD-10-CM Priority Class Noted - Resolved    HTN (hypertension), benign I10 Medium  Unknown - Present    Hyperlipidemia E78.5 Low  Unknown - Present    CAD (coronary artery disease) I25.10 Low  Unknown - Present    CHF (congestive heart failure) (CMS-HCC) I50.9 Medium  Unknown - Present    DJD (degenerative joint disease) M19.90 Medium  Unknown - Present    Cystocele N81.10   Unknown - Present    Peripheral neuropathy G62.9 Medium  9/10/2009 - Present    Renal failure N19   2013 - Present    Encounter for long-term (current) use of insulin (CMS-HCC) Z79.4   2014 - Present    Nephritis and nephropathy, not specified as acute or chronic, with other specified pathological lesion in kidney, in diseases classified elsewhere N05.8 High  2014 - Present    CKD (chronic kidney disease) stage 3, GFR 30-59 ml/min N18.3 Medium  10/7/2014 - Present    Aortic atherosclerosis (CMS-HCC) I70.0 Medium  2015 - Present    Hyponatremia E87.1 Medium  3/18/2016 - Present    Generalized " [facility]    Daily progress Note    Patient: Raoul Montano MRN: [id number]  CSN: [id number]    YOB: 1956  Age: 64 y.o. Sex: female    DOA: 4/26/2017 LOS:  LOS: 17 days                    Subjective:     CC: weakness    Pt is alert and awake, NAD  She is very anxious, wants more medication        Objective:      Visit Vitals    /73 (BP 1 Location: Right arm, BP Patient Position: At rest)    Pulse (!) 116    Temp 98.4 °F (36.9 °C)    Resp 22    Ht 5' 4\" (1.626 m)    Wt 68.1 kg (150 lb 2.1 oz)    SpO2 99%    BMI 25.77 kg/m2       Physical Exam:  NC/AT  OP clear, no thrush   NO JVD,TMG  S1/S2 RRR  CTAB, NO WHEEZING  NT,ND, NABS  NO EDEMA  MS 3+/5 in LE, 4/5 UE, Dec ROM Rt shoulder         Intake and Output:  Current Shift:  05/14 0701 - 05/14 1900  In: 200 [P.O.:200]  Out: -   Last three shifts:  05/12 1901 - 05/14 0700  In: 2130 [P.O.:120; I.V.:2010]  Out: 1625 [Urine:1625]    Recent Results (from the past 24 hour(s))   CBC WITH AUTOMATED DIFF    Collection Time: 05/14/17  4:34 AM   Result Value Ref Range    WBC 4.6 4.6 - 13.2 K/uL    RBC 3.13 (L) 4.20 - 5.30 M/uL    HGB 9.7 (L) 12.0 - 16.0 g/dL    HCT 28.5 (L) 35.0 - 45.0 %    MCV 91.1 74.0 - 97.0 FL    MCH 31.0 24.0 - 34.0 PG    MCHC 34.0 31.0 - 37.0 g/dL    RDW 18.9 (H) 11.6 - 14.5 %    PLATELET 921 721 - 024 K/uL    MPV 10.2 9.2 - 11.8 FL    NEUTROPHILS 41 40 - 73 %    LYMPHOCYTES 47 21 - 52 %    MONOCYTES 12 (H) 3 - 10 %    EOSINOPHILS 0 0 - 5 %    BASOPHILS 0 0 - 2 %    ABS. NEUTROPHILS 1.9 1.8 - 8.0 K/UL    ABS. LYMPHOCYTES 2.1 0.9 - 3.6 K/UL    ABS. MONOCYTES 0.6 0.05 - 1.2 K/UL    ABS. EOSINOPHILS 0.0 0.0 - 0.4 K/UL    ABS.  BASOPHILS 0.0 0.0 - 0.06 K/UL    DF AUTOMATED     METABOLIC PANEL, BASIC    Collection Time: 05/14/17  4:34 AM   Result Value Ref Range    Sodium 140 136 - 145 mmol/L    Potassium 3.6 3.5 - 5.5 mmol/L    Chloride 108 100 - 108 mmol/L    CO2 21 21 - 32 mmol/L    Anion gap 11 3.0 - 18 mmol/L    Glucose 92 74 - 99 mg/dL    BUN 5 (L) 7.0 - 18 MG/DL    Creatinine 0.47 (L) 0.6 - 1.3 MG/DL    BUN/Creatinine ratio 11 (L) 12 - 20      GFR est AA >60 >60 ml/min/1.73m2    GFR est non-AA >60 >60 ml/min/1.73m2    Calcium 7.9 (L) 8.5 - 10.1 MG/DL         Current Facility-Administered Medications:     pseudoephedrine (SUDAFED) tablet 30 mg, 30 mg, Oral, Q8H PRN, Robert Pedro MD    dextrose 5% - 0.45% NaCl with KCl 20 mEq/L infusion, 75 mL/hr, IntraVENous, CONTINUOUS, Viktoriya Recio MD, Last Rate: 75 mL/hr at 05/14/17 0343, 75 mL/hr at 05/14/17 0343    klack's mouthwash oral suspension (COMPOUNDED), 5 mL, Oral, QID, Robert Pedro MD, 5 mL at 05/14/17 1030    loratadine (CLARITIN) tablet 10 mg, 10 mg, Oral, DAILY, Robert Pedro MD, 10 mg at 05/14/17 1026    Thiamine Mononitrate (B-1) tablet 100 mg, 100 mg, Oral, DAILY, Cecilio Cordova MD, 100 mg at 05/14/17 1025    gabapentin (NEURONTIN) capsule 600 mg, 600 mg, Oral, TID, Cecilio Cordova MD, 600 mg at 05/14/17 1029    senna (SENOKOT) tablet 17.2 mg, 2 Tab, Oral, QHS, Bucks Nephew, DO, 17.2 mg at 05/13/17 2220    oxyCODONE IR (ROXICODONE) tablet 10 mg, 10 mg, Oral, Q4H PRN, Bucks Nephew, DO, 10 mg at 98/99/73 7886    folic acid (FOLVITE) tablet 1 mg, 1 mg, Oral, DAILY, Ken Booker MD, 1 mg at 05/14/17 1028    acetaminophen (TYLENOL) tablet 650 mg, 650 mg, Oral, Q4H PRN, Bar Duke MD    scopolamine (TRANSDERM-SCOP) 1.5 mg, 1.5 mg, TransDERmal, Q72H, Kayla Orn, DO, 1.5 mg at 05/12/17 1847    QUEtiapine (SEROquel) tablet 50 mg, 50 mg, Oral, QHS, Kayla Orn, DO, 50 mg at 05/13/17 2220    citalopram (CELEXA) tablet 20 mg, 20 mg, Oral, DAILY, Kayla Orn, DO, 20 mg at 05/14/17 1024    benzocaine-menthol (CEPACOL) lozenge 1 Lozenge, 1 Lozenge, Mucous Membrane, PRN, Kayla Orn, DO, 1 Lozenge at 04/28/17 1659    enoxaparin weakness R53.1 High  3/19/2016 - Present    Acute renal failure superimposed on stage 3 chronic kidney disease (CMS-HCC) N17.9, N18.3 Medium  3/19/2016 - Present    Physical deconditioning R53.81 High  3/24/2016 - Present    Acute kidney injury (CMS-HCC) N17.9 High  3/24/2016 - Present    Medical home Z78.9   3/25/2016 - Present    Hypoglycemia associated with diabetes (CMS-HCC) E11.649   7/15/2016 - Present    Leukocytosis D72.829   7/16/2016 - Present    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   7/16/2016 - Present    Lower urinary tract infectious disease N39.0   7/17/2016 - Present    PAF (paroxysmal atrial fibrillation)-probably reason for Eliquis I48.0   7/17/2016 - Present    Aspiration into airway T17.908A   7/18/2016 - Present    Acute respiratory failure (CMS-HCC) J96.00   7/18/2016 - Present    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   7/19/2016 - Present    Syncope R55   11/22/2016 - Present    Acute systolic congestive heart failure, NYHA class 2 (CMS-HCC) I50.21   11/22/2016 - Present    HTN (hypertension) I10   11/23/2016 - Present    DM type 2 (diabetes mellitus, type 2) (CMS-HCC) E11.9   11/23/2016 - Present    Type 2 diabetes mellitus without complication, with long-term current use of insulin ([facility]) E11.9, Z79.4   3/20/2017 - Present      Health Maintenance        Date Due Completion Dates    BONE DENSITY 1/23/2013 1/23/2008, 1/23/2008    RETINAL SCREENING 4/6/2016 4/6/2015, 3/17/2015 (Done), 4/10/2014    Override on 3/17/2015: Done    IMM DTaP/Tdap/Td Vaccine (1 - Tdap) 11/23/2017 (Originally 6/22/1954) ---    IMM INFLUENZA (1) 9/1/2017 ---    A1C SCREENING 2/3/2018 8/3/2017, 2/1/2017, 4/19/2016, 2/3/2016, 8/3/2015, 3/2/2015, 10/29/2014, 9/8/2014, 1/29/2014    FASTING LIPID PROFILE 2/8/2018 2/8/2017, 11/24/2016, 8/10/2015, 11/5/2014, 6/2/2014, 4/4/2014, 7/1/2010    DIABETES MONOFILAMENT / LE EXAM 3/20/2018 3/20/2017, 9/1/2015 (Done), 3/2/2015 (N/S), 3/2/2015 (Done), 3/2/2015 (N/S), 3/2/2015 (N/S), 3/2/2015 (Done), 9/8/2014     (LOVENOX) injection 40 mg, 40 mg, SubCUTAneous, Q24H, Viktoriya Recio MD, 40 mg at 05/14/17 0502    ondansetron (ZOFRAN) injection 4 mg, 4 mg, IntraVENous, Q4H PRN, Margarita Brush MD, 4 mg at 04/30/17 1159    LORazepam (ATIVAN) tablet 1 mg, 1 mg, Oral, Q6H PRN, Amarilys Claros DO, 1 mg at 05/09/17 1509    oxymetazoline (AFRIN) 0.05 % nasal spray 2 Spray, 2 Spray, Both Nostrils, BID PRN, Amarilys Claros DO    sodium chloride (NS) flush 5-10 mL, 5-10 mL, IntraVENous, PRN, Klaudia Mcfadden MD, 10 mL at 05/05/17 0544    Lab Results   Component Value Date/Time    Glucose 92 05/14/2017 04:34 AM    Glucose 90 05/12/2017 04:50 AM    Glucose 93 05/09/2017 06:25 AM    Glucose 79 05/08/2017 09:13 AM    Glucose 128 05/07/2017 03:15 AM        Assessment/Plan     Principal Problem:    Encephalopathy (4/27/2017)    Active Problems:    Dehydration (4/27/2017)      Hypokalemia (4/27/2017)      Lactic acidosis (4/27/2017)      Polycythemia ([address] (4/27/2017)      Leukocytosis (4/27/2017)      UTI (urinary tract infection) (4/27/2017)      Elevated liver enzymes (4/27/2017)      Acute inflammatory polyneuropathy (HCC) (5/5/2017)      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)      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)      - GBS  Axonal Type  Completed IVIG for 5 days   - UTI, completed treatment   - Transaminitis Likely 2/2 Fatty Liver  - COPD, stable   - Depression w/ Possible Component of Psychosis, on celexa and seroquel   - Acute Metabolic Encephalopathy, resolved  - AICHA, resolved  - otitis media, PCN and levaquin allergy, completed  doxy  - chronic pain   - thiamine and folate def, on supplement   - allergic rhinitis, add claritin  - dysphagia, improved with magic mouthwash  SLP to eval   - transient hypotension 2/2 poor po intake, no sign of sepsis         Placement in process   Monitor for polypharmacy         Olayinka Harrison MD  5/14/2017, 4:17 PM Override on 9/1/2015: Done    Override on 3/2/2015: (N/S)    Override on 3/2/2015: Done    Override on 3/2/2015: (N/S)    Override on 3/2/2015: (N/S)    Override on 3/2/2015: Done    SERUM CREATININE 6/2/2018 6/2/2017, 4/19/2017, 2/8/2017, 12/9/2016, 11/26/2016, 11/25/2016, 11/24/2016, 11/22/2016, 7/20/2016, 7/18/2016, 7/17/2016, 7/16/2016, 7/15/2016, 5/16/2016, 4/25/2016, 4/18/2016, 4/14/2016, 4/11/2016, 4/9/2016, 4/5/2016, 4/4/2016, 4/3/2016, 4/2/2016, 4/1/2016, 3/31/2016, 3/30/2016, 3/29/2016, 3/25/2016, 3/24/2016, 3/23/2016, 3/22/2016, 3/21/2016, 3/20/2016, 3/19/2016, 3/18/2016, 3/17/2016, 2/22/2016, 8/10/2015, 2/17/2015, 2/17/2015, 12/15/2014, 11/19/2014, 11/5/2014, 10/11/2014, 10/10/2014, 10/9/2014, 10/8/2014, 10/7/2014, 10/6/2014, 4/29/2014, 4/4/2014, 1/29/2014, 7/1/2010    URINE ACR / MICROALBUMIN 8/3/2018 8/3/2017, 2/22/2016, 11/5/2014, 4/29/2014, 7/1/2010            Current Immunizations     13-VALENT PCV PREVNAR 9/16/2015    Pneumococcal polysaccharide vaccine (PPSV-23) 10/29/2014, 9/6/2009    SHINGLES VACCINE 10/1/2016    Tuberculin Skin Test 6/26/2016, 4/19/2016 10:57 AM      Below and/or attached are the medications your provider expects you to take. Review all of your home medications and newly ordered medications with your provider and/or pharmacist. Follow medication instructions as directed by your provider and/or pharmacist. Please keep your medication list with you and share with your provider. Update the information when medications are discontinued, doses are changed, or new medications (including over-the-counter products) are added; and carry medication information at all times in the event of emergency situations     Allergies:  TETRACAINE HCL - (reactions not documented)     VANCOMYCIN - (reactions not documented)     VIGAMOX - (reactions not documented)               Medications  Valid as of: August 07, 2017 - 10:57 AM    Generic Name Brand Name Tablet Size Instructions for use    Apixaban  (Tab) ELIQUIS 5mg Take 1 Tab by mouth 2 Times a Day.        Atorvastatin Calcium (Tab) LIPITOR 80 MG Take 1 Tab by mouth every morning.        Carvedilol (Tab) COREG 25 MG Take 1 Tab by mouth 2 times a day, with meals.        Docusate Sodium (Cap) COLACE 100 MG Take 100 mg by mouth 2 times a day as needed for Constipation. Take 1 capsule by mouth twice daily as needed for constipation for up to 15 days.        Furosemide (Tab) LASIX 40 MG Take 1.5 Tabs by mouth every day.        Insulin Glargine (Solution) LANTUS 100 UNIT/ML Inject 10 Units as instructed every evening.        Lisinopril (Tab) PRINIVIL 20 MG Take 20 mg by mouth every day.        Multiple Vitamin (Tab) THERAGRAN  Take 1 Tab by mouth every day.        Spironolactone (Tab) ALDACTONE 25 MG Take 1 Tab by mouth every day.        .                 Medicines prescribed today were sent to:     [id number] - [place], [place] - [address]    [address] [facility] [address]    Phone: [phone] Fax: [phone]    Open 24 Hours?: No      Medication refill instructions:       If your prescription bottle indicates you have medication refills left, it is not necessary to call your provider’s office. Please contact your pharmacy and they will refill your medication.    If your prescription bottle indicates you do not have any refills left, you may request refills at any time through one of the following ways: The online [employer] system (except Urgent Care), by calling your provider’s office, or by asking your pharmacy to contact your provider’s office with a refill request. Medication refills are processed only during regular business hours and may not be available until the next business day. Your provider may request additional information or to have a follow-up visit with you prior to refilling your medication.   *Please Note: Medication refills are assigned a new Rx number when refilled electronically. Your pharmacy may indicate that no  refills were authorized even though a new prescription for the same medication is available at the pharmacy. Please request the medicine by name with the pharmacy before contacting your provider for a refill.        Other Notes About Your Plan     Patient is enrolled in [facility] with Dr. Coffey.    8/3/16--Chart reviewed for case management intervention.  Not appropriate for chronic care management at this time.           MyChart Status: Patient Declined

## 2017-08-07 NOTE — PROGRESS NOTES
RN-CDE Note  Type 2 Diabetes  Subjective:     Health changes since last visit/interval Hx: General  Health good    Medications (including changes made today)  Current Outpatient Prescriptions   Medication Sig Dispense Refill   • lisinopril (PRINIVIL) 20 MG Tab Take 20 mg by mouth every day.     • furosemide (LASIX) 40 MG Tab Take 1.5 Tabs by mouth every day. (Patient taking differently: Take 40 mg by mouth 2 Times a Day.) 30 Tab    • insulin glargine (LANTUS) 100 UNIT/ML Solution Inject 10 Units as instructed every evening.     • apixaban (ELIQUIS) 5mg Tab Take 1 Tab by mouth 2 Times a Day. (Patient taking differently: Take 2.5 mg by mouth 2 Times a Day.) 60 Tab 11   • carvedilol (COREG) 25 MG Tab Take 1 Tab by mouth 2 times a day, with meals. 60 Tab 2   • atorvastatin (LIPITOR) 80 MG tablet Take 1 Tab by mouth every morning. 90 Tab 1   • spironolactone (ALDACTONE) 25 MG Tab Take 1 Tab by mouth every day. 30 Tab 3   • multivitamin (THERAGRAN) Tab Take 1 Tab by mouth every day. 30 Tab 1   • docusate sodium (COLACE) 100 MG Cap Take 100 mg by mouth 2 times a day as needed for Constipation. Take 1 capsule by mouth twice daily as needed for constipation for up to 15 days.       No current facility-administered medications for this visit.         Taking daily ASA: No  Taking above medications as prescribed: Yes   Patient Denies side effects of medication.    Exercise: Up with cane and walks as tolerated  Diet: Eating 3 meals    Health Maintenance:   Health Maintenance Topics with due status: Overdue       Topic Date Due    BONE DENSITY 01/23/2013    RETINAL SCREENING 04/06/2016         DM:   Last A1c:   Lab Results   Component Value Date/Time    GLYCOHEMOGLOBIN 6.5* 08/03/2017 10:26 AM      A1c goal: < 7    Glucose monitoring frequency: Testing twice daily  trend:   Hypoglycemic episodes: no     Last Retinal Exam: Almost a year Provider: Carmela  Daily Foot Exam: yes  Routine Dental Exams: yes    Lab Results    Component Value Date/Time    MICRO ALB CREAT RATIO 17 08/03/2017 10:26 AM    MICROALBUMIN, URINE RANDOM 0.9 08/03/2017 10:26 AM        ACR Albumin/Creatinine Ratio goal <30     Diabetic complications: none    HTN:   Blood pressure goal <140/<80 .   Currently Rx ACE/ARB: Yes    Dyslipidemia:    Lab Results   Component Value Date/Time    CHOLESTEROL, 02/08/2017 11:31 AM    LDL 57 02/08/2017 11:31 AM    HDL 48 02/08/2017 11:31 AM    TRIGLYCERIDES 101 02/08/2017 11:31 AM       Lab Results   Component Value Date/Time    SODIUM 136 06/02/2017 04:54 PM    POTASSIUM 3.9 06/02/2017 04:54 PM    CHLORIDE 101 06/02/2017 04:54 PM    CO2 24 06/02/2017 04:54 PM    GLUCOSE 127* 06/02/2017 04:54 PM    BUN 60* 06/02/2017 04:54 PM    CREATININE 1.48* 06/02/2017 04:54 PM     Lab Results   Component Value Date/Time    ALKALINE PHOSPHATASE 79 06/02/2017 04:54 PM    AST(SGOT) 22 06/02/2017 04:54 PM    ALT(SGPT) 18 06/02/2017 04:54 PM    TOTAL BILIRUBIN 0.6 06/02/2017 04:54 PM        Currently Rx Statin: Yes      She  reports that she has never smoked. She has never used smokeless tobacco.    Objective:     Exam:  Monofilament: done  Monofilament testing with a 10 gram force: sensation intact: intact bilaterally  Visual Inspection: Feet without maceration, ulcers, fissures.  Pedal pulses: intact bilaterally      Plan:     - Diabetic diet discussed in detail-plate method.  - Home glucose monitoring.  - She will test and log.    - She will walk for 20-30 minutes daily.  - Reviewed medications and advised to take metformin after meals to decrease   G.I.upset.   - Discussed importance of immunizations and yearly eye exams.   - Encouraged patient to attend diabetes education program.   -Educational material distributed.   - Advised daily foot exams. Educated on signs of infection.           CROS: Denies any chest pain, Shortness of breath, Changes bowel or bladder, Lower extremity edema.    Physical Exam:  /60 mmHg  Pulse 58   "Temp(Src) 36.4 °C (97.5 °F)  Ht 1.461 m (4' 9.5\")  Wt 56.7 kg (125 lb)  BMI 26.56 kg/m2  SpO2 99%  Gen.: Well-developed, well-nourished, no apparent distress,pleasant and cooperative with the examination  Skin:  Warm and dry with good turgor. No rashes or suspicious lesions in visible areas  HEENT:Sinuses nontender with palpation, TMs clear, nares patent with pink mucosa and clear rhinorrhea,no septal deviation ,polyps or lesions. lips without lesions, oropharynx clear.  Neck: Trachea midline,no masses or adenopathy. No JVD.  Cor: Regular rate and rhythm without murmur, gallop or rub.  Lungs: Respirations unlabored.Clear to auscultation with equal breath sounds bilaterally. No wheezes, rhonchi.  Extremities: No cyanosis, clubbing or edema        Assessment and Plan.   82 y.o. female     1. Diabetes mellitus type 2 in nonobese (CMS-[facility])  Adequately controlled. Last A1C was 6.5..  Continue on Lantus 10 units in PM .     Recommended medication changes: No changes at this time.    "

## 2017-10-26 ENCOUNTER — PATIENT OUTREACH (OUTPATIENT)
Dept: HEALTH INFORMATION MANAGEMENT | Facility: OTHER | Age: 82
End: 2017-10-26

## 2017-10-26 NOTE — PROGRESS NOTES
Outcome: Requested Call Back     Please transfer to Patient Outreach Team at [phone] when patient returns call.    WebIZ Checked & Epic Updated:  yes    [facility] Verified: yes    Attempt # 1

## 2018-03-27 RX ORDER — INSULIN GLARGINE 100 [IU]/ML
10 INJECTION, SOLUTION SUBCUTANEOUS DAILY
Qty: 10 ML | Refills: 6 | Status: SHIPPED | OUTPATIENT
Start: 2018-03-27 | End: 2019-01-14 | Stop reason: SDUPTHER

## 2018-04-01 RX ORDER — INSULIN GLARGINE 100 [IU]/ML
10 INJECTION, SOLUTION SUBCUTANEOUS DAILY
Qty: 10 ML | Refills: 4 | Status: SHIPPED | OUTPATIENT
Start: 2018-04-01 | End: 2018-06-29 | Stop reason: SDUPTHER

## 2018-06-01 ENCOUNTER — HOSPITAL ENCOUNTER (OUTPATIENT)
Dept: LAB | Facility: MEDICAL CENTER | Age: 83
End: 2018-06-01
Attending: INTERNAL MEDICINE
Payer: MEDICARE

## 2018-06-01 LAB
ANION GAP SERPL CALC-SCNC: 10 MMOL/L (ref 0–11.9)
BASOPHILS # BLD AUTO: 0.5 % (ref 0–1.8)
BASOPHILS # BLD: 0.04 K/UL (ref 0–0.12)
BNP SERPL-MCNC: 348 PG/ML (ref 0–100)
BUN SERPL-MCNC: 60 MG/DL (ref 8–22)
CALCIUM SERPL-MCNC: 9.8 MG/DL (ref 8.5–10.5)
CHLORIDE SERPL-SCNC: 99 MMOL/L (ref 96–112)
CHOLEST SERPL-MCNC: 118 MG/DL (ref 100–199)
CO2 SERPL-SCNC: 26 MMOL/L (ref 20–33)
CREAT SERPL-MCNC: 1.66 MG/DL (ref 0.5–1.4)
EOSINOPHIL # BLD AUTO: 0.27 K/UL (ref 0–0.51)
EOSINOPHIL NFR BLD: 3.2 % (ref 0–6.9)
ERYTHROCYTE [DISTWIDTH] IN BLOOD BY AUTOMATED COUNT: 46.4 FL (ref 35.9–50)
GLUCOSE SERPL-MCNC: 66 MG/DL (ref 65–99)
HCT VFR BLD AUTO: 36.8 % (ref 37–47)
HDLC SERPL-MCNC: 53 MG/DL
HGB BLD-MCNC: 12.3 G/DL (ref 12–16)
IMM GRANULOCYTES # BLD AUTO: 0.03 K/UL (ref 0–0.11)
IMM GRANULOCYTES NFR BLD AUTO: 0.4 % (ref 0–0.9)
LDLC SERPL CALC-MCNC: 52 MG/DL
LYMPHOCYTES # BLD AUTO: 1.42 K/UL (ref 1–4.8)
LYMPHOCYTES NFR BLD: 16.8 % (ref 22–41)
MCH RBC QN AUTO: 32.5 PG (ref 27–33)
MCHC RBC AUTO-ENTMCNC: 33.4 G/DL (ref 33.6–35)
MCV RBC AUTO: 97.4 FL (ref 81.4–97.8)
MONOCYTES # BLD AUTO: 0.7 K/UL (ref 0–0.85)
MONOCYTES NFR BLD AUTO: 8.3 % (ref 0–13.4)
NEUTROPHILS # BLD AUTO: 6.01 K/UL (ref 2–7.15)
NEUTROPHILS NFR BLD: 70.8 % (ref 44–72)
NRBC # BLD AUTO: 0 K/UL
NRBC BLD-RTO: 0 /100 WBC
PLATELET # BLD AUTO: 273 K/UL (ref 164–446)
PMV BLD AUTO: 11 FL (ref 9–12.9)
POTASSIUM SERPL-SCNC: 4.2 MMOL/L (ref 3.6–5.5)
RBC # BLD AUTO: 3.78 M/UL (ref 4.2–5.4)
SODIUM SERPL-SCNC: 135 MMOL/L (ref 135–145)
TRIGL SERPL-MCNC: 66 MG/DL (ref 0–149)
WBC # BLD AUTO: 8.5 K/UL (ref 4.8–10.8)

## 2018-06-01 PROCEDURE — 85025 COMPLETE CBC W/AUTO DIFF WBC: CPT

## 2018-06-01 PROCEDURE — 80048 BASIC METABOLIC PNL TOTAL CA: CPT

## 2018-06-01 PROCEDURE — 83880 ASSAY OF NATRIURETIC PEPTIDE: CPT

## 2018-06-01 PROCEDURE — 80061 LIPID PANEL: CPT

## 2018-06-01 PROCEDURE — 36415 COLL VENOUS BLD VENIPUNCTURE: CPT

## 2018-06-29 ENCOUNTER — OFFICE VISIT (OUTPATIENT)
Dept: MEDICAL GROUP | Facility: MEDICAL CENTER | Age: 83
End: 2018-06-29
Payer: MEDICARE

## 2018-06-29 VITALS
TEMPERATURE: 98.3 F | OXYGEN SATURATION: 95 % | WEIGHT: 128 LBS | HEIGHT: 58 IN | RESPIRATION RATE: 16 BRPM | BODY MASS INDEX: 26.87 KG/M2 | SYSTOLIC BLOOD PRESSURE: 120 MMHG | HEART RATE: 56 BPM | DIASTOLIC BLOOD PRESSURE: 62 MMHG

## 2018-06-29 DIAGNOSIS — I48.20 CHRONIC ATRIAL FIBRILLATION (HCC): ICD-10-CM

## 2018-06-29 DIAGNOSIS — Z00.00 MEDICARE ANNUAL WELLNESS VISIT, SUBSEQUENT: ICD-10-CM

## 2018-06-29 DIAGNOSIS — N18.30 CKD (CHRONIC KIDNEY DISEASE) STAGE 3, GFR 30-59 ML/MIN (HCC): ICD-10-CM

## 2018-06-29 DIAGNOSIS — E78.2 MIXED HYPERLIPIDEMIA: ICD-10-CM

## 2018-06-29 DIAGNOSIS — I10 HTN (HYPERTENSION), BENIGN: ICD-10-CM

## 2018-06-29 DIAGNOSIS — E11.9 DIABETES MELLITUS TYPE 2 IN NONOBESE (HCC): ICD-10-CM

## 2018-06-29 DIAGNOSIS — I50.22 CHRONIC SYSTOLIC CONGESTIVE HEART FAILURE (HCC): ICD-10-CM

## 2018-06-29 LAB
HBA1C MFR BLD: 6.4 % (ref ?–5.8)
INT CON NEG: NEGATIVE
INT CON POS: POSITIVE

## 2018-06-29 PROCEDURE — 83036 HEMOGLOBIN GLYCOSYLATED A1C: CPT | Performed by: FAMILY MEDICINE

## 2018-06-29 RX ORDER — INSULIN GLARGINE 100 [IU]/ML
10 INJECTION, SOLUTION SUBCUTANEOUS DAILY
Qty: 10 ML | Refills: 4 | Status: SHIPPED | OUTPATIENT
Start: 2018-06-29 | End: 2020-09-10

## 2018-06-29 ASSESSMENT — PATIENT HEALTH QUESTIONNAIRE - PHQ9: CLINICAL INTERPRETATION OF PHQ2 SCORE: 0

## 2018-06-29 ASSESSMENT — ENCOUNTER SYMPTOMS: GENERAL WELL-BEING: GOOD

## 2018-06-29 ASSESSMENT — ACTIVITIES OF DAILY LIVING (ADL): BATHING_REQUIRES_ASSISTANCE: 0

## 2018-06-29 NOTE — PROGRESS NOTES
Chief Complaint   Patient presents with   • Annual Wellness Visit         HPI:  Ana is a 83 y.o. here for Medicare Annual Wellness Visit    Medicare annual wellness visit, subsequent  Preventive, and chronic medical issues reviewed.    Diabetes mellitus type 2 in nonobese (HCC)  Adequately controlled.A1C today is 6.4.Denies polyuria, polydipsia, and hypoglycemic episode. Due for monofilament foot exam.Has been on Lantus 10 units in PM    HTN (hypertension), benign  Has been adequately controlled on current medication. Denies headache, chest pain, and SOB.Has been on lisinopril 20 mg daily, Spironolactone 25 mg daily, and Carvedilol 25 mg bid.    Mixed hyperlipidemia  She has been tolerating the statin. Denies muscle pain LFTs has been normal, has been on Lipitor 80 mg daily.    Chronic systolic congestive heart failure (HCC)  Has been mostly asymptomatic. Her  last echo was done recently showed improvement in her EF was 35, now is 45 %.RVSP was 35 mmHg.has been Lasix from 40 mg twice daily, and spironolactone 25 mg daily. She follows up with cardiology Dr Simmons.    CKD (chronic kidney disease) stage 3, GFR 30-59 ml/min  She has been asymptomatic. Her last eGFR was 30. Asymptomatic.Discussed a referral to nephrology, patient preferred to weight and watch if it gets to stage 4.    Chronic atrial fibrillation (HCC)  Has been asymptomatic, denies chest pain, palpitation, and SOB..Controlled rate and rhythm. Has been  on Eliquis         Patient Active Problem List    Diagnosis Date Noted   • Physical deconditioning 03/24/2016     Priority: High   • Acute kidney injury (HCC) 03/24/2016     Priority: High   • Generalized weakness 03/19/2016     Priority: High   • Nephritis and nephropathy, not specified as acute or chronic, with other specified pathological lesion in kidney, in diseases classified elsewhere 09/08/2014     Priority: High   • Acute renal failure superimposed on stage 3 chronic kidney disease (HCC)  03/19/2016     Priority: Medium   • Hyponatremia 03/18/2016     Priority: Medium   • Aortic atherosclerosis ([facility]) 04/28/2015     Priority: Medium   • CKD (chronic kidney disease) stage 3, GFR 30-59 ml/min 10/07/2014     Priority: Medium   • Peripheral neuropathy ([facility]) 09/10/2009     Priority: Medium   • HTN (hypertension), benign      Priority: Medium   • CHF (congestive heart failure) ([facility])      Priority: Medium   • DJD (degenerative joint disease)      Priority: Medium   • Hyperlipidemia      Priority: Low   • CAD (coronary artery disease)      Priority: Low   • Type 2 diabetes mellitus without complication, with long-term current use of insulin ([facility]) 03/20/2017   • HTN (hypertension) 11/23/2016   • DM type 2 (diabetes mellitus, type 2) ([facility]) 11/23/2016   • Syncope 11/22/2016   • Acute systolic congestive heart failure, NYHA class 2 ([facility]) 11/22/2016   • 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   • Aspiration into airway 07/18/2016   • Acute respiratory failure ([facility]) 07/18/2016   • Lower urinary tract infectious disease 07/17/2016   • PAF (paroxysmal atrial fibrillation)-probably reason for Eliquis 07/17/2016   • Leukocytosis 07/16/2016   • Presbyesophagus 07/16/2016   • Hypoglycemia associated with diabetes ([facility]) 07/15/2016   • Medical home 03/25/2016   • Encounter for long-term (current) use of insulin ([facility]) 09/08/2014   • Renal failure 01/09/2013   • Cystocele        Current Outpatient Prescriptions   Medication Sig Dispense Refill   • Multiple Vitamins-Minerals (MULTI COMPLETE PO) Take  by mouth.     • insulin glargine (LANTUS) 100 UNIT/ML Solution Inject 10 Units as instructed every day. 10 mL 4   • insulin glargine (LANTUS) 100 UNIT/ML Solution Inject 10 Units as instructed every day. 10 mL 6   • lisinopril (PRINIVIL) 20 MG Tab Take 20 mg by mouth every day.     • furosemide (LASIX) 40 MG Tab Take 1.5 Tabs by mouth every day. (Patient taking differently: Take 40 mg by mouth 2 Times a Day.) 30 Tab    • apixaban (ELIQUIS)  5mg Tab Take 1 Tab by mouth 2 Times a Day. (Patient taking differently: Take 2.5 mg by mouth 2 Times a Day.) 60 Tab 11   • carvedilol (COREG) 25 MG Tab Take 1 Tab by mouth 2 times a day, with meals. 60 Tab 2   • atorvastatin (LIPITOR) 80 MG tablet Take 1 Tab by mouth every morning. 90 Tab 1   • spironolactone (ALDACTONE) 25 MG Tab Take 1 Tab by mouth every day. 30 Tab 3   • docusate sodium (COLACE) 100 MG Cap Take 100 mg by mouth 2 times a day as needed for Constipation. Take 1 capsule by mouth twice daily as needed for constipation for up to 15 days.     • multivitamin (THERAGRAN) Tab Take 1 Tab by mouth every day. (Patient not taking: Reported on 6/29/2018) 30 Tab 1     No current facility-administered medications for this visit.         Patient is taking medications as noted in medication list.  Current supplements as per medication list.     Allergies: Tetracaine hcl [tetcaine]; Vancomycin; and Vigamox [moxifloxacin]    Current social contact/activities: Visits with friends and family     Is patient current with immunizations? No, due for TDAP. Patient is interested in receiving NONE today.    She  reports that she has never smoked. She has never used smokeless tobacco. She reports that she does not drink alcohol or use drugs.  Counseling given: Not Answered        DPA/Advanced directive: Patient does not have an Advanced Directive.  A packet and workshop information was given on Advanced Directives.    ROS:    Gait: Uses a walker 4x4 and cane   Ostomy: No   Other tubes: No   Amputations: No   Chronic oxygen use No   Last eye exam 1 month ago   Wears hearing aids: No   : Reports urinary leakage during the last 6 months that has somewhat interfered with their daily activities or sleep.      Depression Screening    Little interest or pleasure in doing things?  0 - not at all  Feeling down, depressed, or hopeless? 0 - not at all  Patient Health Questionnaire Score: 0    If depressive symptoms identified deferred  to follow up visit unless specifically addressed in assessment and plan.    Interpretation of PHQ-9 Total Score   Score Severity   1-4 No Depression   5-9 Mild Depression   10-14 Moderate Depression   15-19 Moderately Severe Depression   20-27 Severe Depression    Screening for Cognitive Impairment    Three Minute Recall (leader, season, table)  3/3 Leader Season table  Jl clock face with all 12 numbers and set the hands to show 10 past 11.  Yes 5/5 clock 11:10  If cognitive concerns identified, deferred for follow up unless specifically addressed in assessment and plan.    Fall Risk Assessment    Has the patient had two or more falls in the last year or any fall with injury in the last year?  No  If fall risk identified, deferred for follow up unless specifically addressed in assessment and plan.    Safety Assessment    Throw rugs on floor.  No  Handrails on all stairs.  Yes  Good lighting in all hallways.  Yes  Difficulty hearing.  No  Patient counseled about all safety risks that were identified.    Functional Assessment ADLs    Are there any barriers preventing you from cooking for yourself or meeting nutritional needs?  No.    Are there any barriers preventing you from driving safely or obtaining transportation?  Yes. Pt reports friends and son does all the driving  Are there any barriers preventing you from using a telephone or calling for help?  No.    Are there any barriers preventing you from shopping?  No.    Are there any barriers preventing you from taking care of your own finances?  No.    Are there any barriers preventing you from managing your medications?  No.    Are there any barriers preventing you from showering, bathing or dressing yourself?  No.    Are you currently engaging in any exercise or physical activity?  No.     What is your perception of your health?  Good.    Health Maintenance Summary                IMM DTaP/Tdap/Td Vaccine Overdue 6/22/1954     BONE DENSITY Overdue 1/23/2013       Done 1/23/2008 DS-BONE DENSITY STUDY (DEXA)     Patient has more history with this topic...    RETINAL SCREENING Overdue 4/6/2016      Done 4/6/2015 REFERRAL FOR RETINAL SCREENING EXAM     Patient has more history with this topic...    A1C SCREENING Overdue 2/3/2018      Done 8/3/2017 HEMOGLOBIN A1C      Patient has more history with this topic...    DIABETES MONOFILAMENT / LE EXAM Overdue 3/20/2018      Done 3/20/2017 AMB DIABETIC MONOFILAMENT LOWER EXTREMITY EXAM     Patient has more history with this topic...    Annual Wellness Visit Next Due 7/26/2018      Done 7/25/2017 Visit Dx: Medicare annual wellness visit, initial    URINE ACR / MICROALBUMIN Next Due 8/3/2018      Done 8/3/2017 MICROALBUMIN CREAT RATIO URINE     Patient has more history with this topic...    IMM INFLUENZA Next Due 9/1/2018     FASTING LIPID PROFILE Next Due 6/1/2019      Done 6/1/2018 LIPID PROFILE     Patient has more history with this topic...    SERUM CREATININE Next Due 6/1/2019      Done 6/1/2018 BASIC METABOLIC PANEL      Patient has more history with this topic...          Patient Care Team:  Rich Coffey M.D. as PCP - General (Geriatrics)  Adryan Simmons M.D. (Cardiology)    Social History   Substance Use Topics   • Smoking status: Never Smoker   • Smokeless tobacco: Never Used   • Alcohol use No     Family History   Problem Relation Age of Onset   • Hypertension Mother    • Heart Disease Mother    • No Known Problems Maternal Grandmother    • No Known Problems Maternal Grandfather    • Diabetes Daughter    • Seizures Son    • No Known Problems Son    • Heart Disease Son      She  has a past medical history of CAD (coronary artery disease); CATARACT; CHF (congestive heart failure) (HCC); Cystocele; Diabetes; DJD (degenerative joint disease); HTN (hypertension), benign; Hyperlipidemia; Hypertension; Medical home; Myocardial infarct (HCC) (2000); PVD (peripheral vascular disease) (HCC); Renal disorder; Unspecified urinary  incontinence; and Urinary bladder disorder. She also has no past medical history of Breast cancer (HCC).   Past Surgical History:   Procedure Laterality Date   • CATARACT PHACO WITH IOL  3/12/2014    Performed by Murtaza Casey M.D. at SURGERY SAME DAY [facility]   • MULTIPLE CORONARY ARTERY BYPASS  2004   • ABDOMINAL HYSTERECTOMY TOTAL             Exam:     There were no vitals taken for this visit. There is no height or weight on file to calculate BMI.    Hearing, good .    Dentition , good  Alert, oriented in no acute distress.  Eye contact is good, speech goal directed, affect calm    Monofilament: done  Monofilament testing with a 10 gram force: sensation intact: intact bilaterally  Visual Inspection: Feet without maceration, ulcers, fissures.  Pedal pulses: intact bilaterally    Assessment and Plan. The following treatment and monitoring plan is recommended:    83 y.o. female     1. Medicare annual wellness visit, subsequent  Preventive, and chronic medical issues reviewed.    - ANNUAL WELLNESS VISIT SUBSEQ    2. Diabetes mellitus type 2 in nonobese (HCC)  Adequately controlled.A1C today is 6.4.  Continue on Lantus 10 units in PM  Monofilament foot exam showed no sign of neuropathy.    - POCT A1C  - Diabetic Monofilament LE Exam  - ANNUAL WELLNESS VISIT SUBSEQ    3. HTN (hypertension), benign  Has been adequately controlled on current medication. Denies headache, chest pain, and SOB.  Continue on lisinopril 20 mg daily, Spironolactone 25 mg daily, and Carvedilol 25 mg bid.    - ANNUAL WELLNESS VISIT SUBSEQ    4. Mixed hyperlipidemia  He has been tolerating the statin. Denies muscle pain LFTs has been normal  Continue on Lipitor 80 mg daily.    - ANNUAL WELLNESS VISIT SUBSEQ    5. Chronic systolic congestive heart failure (HCC)  Last echo was done recently showed EF of 45 %.RVSP was 35 mmHg.(EF improved , was 35 %)  Currently she is on medical treatment.  Continue Lasix from 40 mg twice daily, and  spironolactone 25 mg daily  She follows up with cardiology Dr Simmons.     - ANNUAL WELLNESS VISIT SUBSEQ    6. CKD (chronic kidney disease) stage 3, GFR 30-59 ml/min  Last eGFR was 30. Asymptomatic.Discussed a referral to nephrology, patient preferred to weight and watch if it gets to stage 4.  Could be because of her CHF.  Will continue watch.    - ANNUAL WELLNESS VISIT SUBSEQ    7. Chronic atrial fibrillation (HCC)  Stable, asymptomatic.Controlled rate and rhythm.    Continue  on Eliquis     - ANNUAL WELLNESS VISIT SUBSEQ        Services suggested:   Health Care Screening recommendations as per orders if indicated.  Referrals offered: PT/OT/Nutrition counseling/Behavioral Health/Smoking cessation as per orders if indicated.    Discussion today about general wellness and lifestyle habits:    · Prevent falls and reduce trip hazards; Cautioned about securing or removing rugs.  · Have a working fire alarm and carbon monoxide detector;   · Engage in regular physical activity and social activities       Follow-up: No Follow-up on file.

## 2018-12-03 ENCOUNTER — TELEPHONE (OUTPATIENT)
Dept: HEALTH INFORMATION MANAGEMENT | Facility: OTHER | Age: 83
End: 2018-12-03

## 2018-12-07 ENCOUNTER — HOSPITAL ENCOUNTER (OUTPATIENT)
Dept: LAB | Facility: MEDICAL CENTER | Age: 83
End: 2018-12-07
Attending: INTERNAL MEDICINE
Payer: MEDICARE

## 2018-12-07 LAB
ALBUMIN SERPL BCP-MCNC: 3.9 G/DL (ref 3.2–4.9)
ALBUMIN/GLOB SERPL: 1.3 G/DL
ALP SERPL-CCNC: 77 U/L (ref 30–99)
ALT SERPL-CCNC: 17 U/L (ref 2–50)
ANION GAP SERPL CALC-SCNC: 8 MMOL/L (ref 0–11.9)
AST SERPL-CCNC: 24 U/L (ref 12–45)
BILIRUB SERPL-MCNC: 0.8 MG/DL (ref 0.1–1.5)
BNP SERPL-MCNC: 554 PG/ML (ref 0–100)
BUN SERPL-MCNC: 37 MG/DL (ref 8–22)
CALCIUM SERPL-MCNC: 10 MG/DL (ref 8.5–10.5)
CHLORIDE SERPL-SCNC: 98 MMOL/L (ref 96–112)
CHOLEST SERPL-MCNC: 107 MG/DL (ref 100–199)
CO2 SERPL-SCNC: 25 MMOL/L (ref 20–33)
CREAT SERPL-MCNC: 1.38 MG/DL (ref 0.5–1.4)
GLOBULIN SER CALC-MCNC: 3 G/DL (ref 1.9–3.5)
GLUCOSE SERPL-MCNC: 75 MG/DL (ref 65–99)
HDLC SERPL-MCNC: 55 MG/DL
LDLC SERPL CALC-MCNC: 43 MG/DL
POTASSIUM SERPL-SCNC: 4.4 MMOL/L (ref 3.6–5.5)
PROT SERPL-MCNC: 6.9 G/DL (ref 6–8.2)
SODIUM SERPL-SCNC: 131 MMOL/L (ref 135–145)
TRIGL SERPL-MCNC: 46 MG/DL (ref 0–149)
TSH SERPL DL<=0.005 MIU/L-ACNC: 1.67 UIU/ML (ref 0.38–5.33)

## 2018-12-07 PROCEDURE — 36415 COLL VENOUS BLD VENIPUNCTURE: CPT

## 2018-12-07 PROCEDURE — 83036 HEMOGLOBIN GLYCOSYLATED A1C: CPT

## 2018-12-07 PROCEDURE — 83880 ASSAY OF NATRIURETIC PEPTIDE: CPT

## 2018-12-07 PROCEDURE — 80053 COMPREHEN METABOLIC PANEL: CPT

## 2018-12-07 PROCEDURE — 84443 ASSAY THYROID STIM HORMONE: CPT

## 2018-12-07 PROCEDURE — 80061 LIPID PANEL: CPT

## 2018-12-12 LAB
EST. AVERAGE GLUCOSE BLD GHB EST-MCNC: 137 MG/DL
HBA1C MFR BLD: 6.4 % (ref 0–5.6)

## 2019-01-14 RX ORDER — INSULIN GLARGINE 100 [IU]/ML
10 INJECTION, SOLUTION SUBCUTANEOUS DAILY
Qty: 10 ML | Refills: 6 | Status: SHIPPED | OUTPATIENT
Start: 2019-01-14 | End: 2020-04-01

## 2019-01-25 ENCOUNTER — TELEPHONE (OUTPATIENT)
Dept: MEDICAL GROUP | Facility: MEDICAL CENTER | Age: 84
End: 2019-01-25

## 2019-01-25 NOTE — TELEPHONE ENCOUNTER
Good morning, Ana is requesting lab orders be placed in her chart. Also, asking if I would please call when this is completed.  Thank you, and have a good day.

## 2019-01-30 NOTE — TELEPHONE ENCOUNTER
There is a certain criteria for diabetic patient to be eligible for that continuous blood glucose monitoring and she is not eligible for that, however last A1c was 6.5.  The insurance will not cover it.

## 2019-01-31 NOTE — TELEPHONE ENCOUNTER
Tried to contact pt regarding Dr Coffey reply for glucose diabetic testing.  Patient hung up twice.

## 2019-02-04 ENCOUNTER — TELEPHONE (OUTPATIENT)
Dept: MEDICAL GROUP | Facility: MEDICAL CENTER | Age: 84
End: 2019-02-04

## 2019-02-04 DIAGNOSIS — Z79.4 TYPE 2 DIABETES MELLITUS WITHOUT COMPLICATION, WITH LONG-TERM CURRENT USE OF INSULIN (HCC): ICD-10-CM

## 2019-02-04 DIAGNOSIS — E11.9 TYPE 2 DIABETES MELLITUS WITHOUT COMPLICATION, WITH LONG-TERM CURRENT USE OF INSULIN (HCC): ICD-10-CM

## 2019-02-04 NOTE — TELEPHONE ENCOUNTER
1. Caller Name: Ana                      Call Back Number: [phone] (home)       2. Message: patient is requesting a new freestyle nena meter be sent to her [facility] pharmacy.    3. Patient approves office to leave a detailed voicemail/MyChart message: yes

## 2019-04-10 ENCOUNTER — OFFICE VISIT (OUTPATIENT)
Dept: MEDICAL GROUP | Facility: MEDICAL CENTER | Age: 84
End: 2019-04-10
Payer: MEDICARE

## 2019-04-10 VITALS
HEART RATE: 60 BPM | BODY MASS INDEX: 27.5 KG/M2 | TEMPERATURE: 97.2 F | HEIGHT: 58 IN | OXYGEN SATURATION: 96 % | WEIGHT: 131 LBS | SYSTOLIC BLOOD PRESSURE: 130 MMHG | DIASTOLIC BLOOD PRESSURE: 60 MMHG

## 2019-04-10 DIAGNOSIS — I48.0 PAROXYSMAL ATRIAL FIBRILLATION (HCC): ICD-10-CM

## 2019-04-10 DIAGNOSIS — I10 HTN (HYPERTENSION), BENIGN: ICD-10-CM

## 2019-04-10 DIAGNOSIS — N18.30 CKD (CHRONIC KIDNEY DISEASE) STAGE 3, GFR 30-59 ML/MIN (HCC): ICD-10-CM

## 2019-04-10 DIAGNOSIS — I50.22 CHRONIC SYSTOLIC CHF (CONGESTIVE HEART FAILURE) (HCC): ICD-10-CM

## 2019-04-10 DIAGNOSIS — I42.9 CARDIOMYOPATHY, UNSPECIFIED TYPE (HCC): ICD-10-CM

## 2019-04-10 DIAGNOSIS — E78.2 MIXED HYPERLIPIDEMIA: ICD-10-CM

## 2019-04-10 DIAGNOSIS — Z79.4 TYPE 2 DIABETES MELLITUS WITHOUT COMPLICATION, WITH LONG-TERM CURRENT USE OF INSULIN (HCC): ICD-10-CM

## 2019-04-10 DIAGNOSIS — M79.675 TOE PAIN, LEFT: ICD-10-CM

## 2019-04-10 DIAGNOSIS — N25.81 SECONDARY HYPERPARATHYROIDISM OF RENAL ORIGIN (HCC): ICD-10-CM

## 2019-04-10 DIAGNOSIS — E11.9 TYPE 2 DIABETES MELLITUS WITHOUT COMPLICATION, WITH LONG-TERM CURRENT USE OF INSULIN (HCC): ICD-10-CM

## 2019-04-10 LAB
HBA1C MFR BLD: 6.1 % (ref 0–5.6)
INT CON NEG: ABNORMAL
INT CON POS: ABNORMAL

## 2019-04-10 PROCEDURE — 99214 OFFICE O/P EST MOD 30 MIN: CPT | Performed by: FAMILY MEDICINE

## 2019-04-10 PROCEDURE — 83036 HEMOGLOBIN GLYCOSYLATED A1C: CPT | Performed by: FAMILY MEDICINE

## 2019-04-10 PROCEDURE — 8041 PR SCP AHA: Performed by: FAMILY MEDICINE

## 2019-04-10 NOTE — PROGRESS NOTES
RN-KADE Note    Subjective:     Health changes since last visit/interval Hx: Having pain on the side of her left big toe.    Medications (including changes made today)  Current Outpatient Prescriptions   Medication Sig Dispense Refill   • Blood Glucose Monitoring Suppl Device Meter: Dispense Device of Insurance Preference(freestyle nena meter) . Sig. Use as directed for blood sugar monitoring. #1. NR. 1 Device 0   • insulin glargine (LANTUS) 100 UNIT/ML Solution Inject 10 Units as instructed every day. 10 mL 6   • Multiple Vitamins-Minerals (MULTI COMPLETE PO) Take  by mouth.     • insulin glargine (LANTUS) 100 UNIT/ML Solution Inject 10 Units as instructed every day. 10 mL 4   • lisinopril (PRINIVIL) 20 MG Tab Take 20 mg by mouth every day.     • furosemide (LASIX) 40 MG Tab Take 1.5 Tabs by mouth every day. (Patient taking differently: Take 40 mg by mouth 2 Times a Day.) 30 Tab    • apixaban (ELIQUIS) 5mg Tab Take 1 Tab by mouth 2 Times a Day. (Patient taking differently: Take 2.5 mg by mouth 2 Times a Day.) 60 Tab 11   • carvedilol (COREG) 25 MG Tab Take 1 Tab by mouth 2 times a day, with meals. 60 Tab 2   • atorvastatin (LIPITOR) 80 MG tablet Take 1 Tab by mouth every morning. 90 Tab 1   • spironolactone (ALDACTONE) 25 MG Tab Take 1 Tab by mouth every day. 30 Tab 3   • docusate sodium (COLACE) 100 MG Cap Take 100 mg by mouth 2 times a day as needed for Constipation. Take 1 capsule by mouth twice daily as needed for constipation for up to 15 days.     • multivitamin (THERAGRAN) Tab Take 1 Tab by mouth every day. 30 Tab 1     No current facility-administered medications for this visit.        Taking daily ASA: No  Taking above medications as prescribed: yes  SIDE EFFECTS: Patient denies side effects to medications    Exercise: Walking with cane  Diet: Eating the plate method and eating Meals on Wheels  Patient's body mass index is 27.38 kg/m². Exercise and nutrition counseling were performed at this  visit.      Health Maintenance:   Health Maintenance Due   Topic Date Due   • IMM HEP B VACCINE (1 of 3 - Risk 3-dose series) 06/22/1954   • IMM DTaP/Tdap/Td Vaccine (1 - Tdap) 06/22/1954   • BONE DENSITY  01/23/2013   • RETINAL SCREENING  04/06/2016   • IMM ZOSTER VACCINES (2 of 3) 11/26/2016   • URINE ACR / MICROALBUMIN  08/03/2018       IDM:   Last A1c:   Lab Results   Component Value Date/Time    HBA1C 6.1 (A) 04/10/2019 01:41 PM      A1C GOAL: < 7    Glucose monitoring frequency: Testing blood sugars randomly  staying around 100  Hypoglycemic episodes: no    Last [facility]inal Exam: October 2018 at [facility]  Daily Foot Exam: Yes   Routine Dental Exams: No    Lab Results   Component Value Date/Time    MALBCRT 17 08/03/2017 10:26 AM    MICROALBUR 0.9 08/03/2017 10:26 AM        ACR Albumin/Creatinine Ratio goal <30     HTN:   Blood pressure goal <140/<80 .   Currently Rx ACE/ARB: Yes    Dyslipidemia:    Lab Results   Component Value Date/Time    CHOLSTRLTOT 107 12/07/2018 09:55 AM    LDL 43 12/07/2018 09:55 AM    HDL 55 12/07/2018 09:55 AM    TRIGLYCERIDE 46 12/07/2018 09:55 AM       Lab Results   Component Value Date/Time    SODIUM 131 (L) 12/07/2018 09:55 AM    POTASSIUM 4.4 12/07/2018 09:55 AM    CHLORIDE 98 12/07/2018 09:55 AM    CO2 25 12/07/2018 09:55 AM    GLUCOSE 75 12/07/2018 09:55 AM    BUN 37 (H) 12/07/2018 09:55 AM    CREATININE 1.38 12/07/2018 09:55 AM     Lab Results   Component Value Date/Time    ALKPHOSPHAT 77 12/07/2018 09:55 AM    ASTSGOT 24 12/07/2018 09:55 AM    ALTSGPT 17 12/07/2018 09:55 AM    TBILIRUBIN 0.8 12/07/2018 09:55 AM        Currently Rx Statin: Yes    She  reports that she has never smoked. She has never used smokeless tobacco.    Objective:     Exam:  Monofilament: done   Monofilament testing with a 10 gram force: sensation intact: intact bilaterally  Visual Inspection: Feet without maceration, ulcers, fissures.  Pain on Left big toe that is getting better.  Pedal pulses:  intact bilaterally    Plan:     Discussed and educated on:   - All medications, side effects and compliance (discussed carefully)  - Annual eye examinations at Ophthalmology  - Home glucose monitoring emphasized  - Weight control and daily exercise    Recommended medication changes: Consider reducing or stopping Lantus insulin.      CC: Diabetes, hypertension, hyperlipidemia, CHF, CKD, A. fib.    HPI:   Ana presents today to discuss the following medical issues:    Diabetes mellitus type 2 in nonobese (HCC)  Adequately controlled.A1C today is 6.1.Denies polyuria, polydipsia, and hypoglycemic episode. Has been on Lantus 10 units at night.  Patient was seen today with my diabetic nurse and I agree with our plans.     HTN (hypertension), benign  Has been adequately controlled on current medication. Denies headache, chest pain, and SOB.Has been on lisinopril 20 mg daily, Spironolactone 25 mg daily, and Carvedilol 25 mg bid.     Mixed hyperlipidemia  She has been tolerating the statin. Denies muscle pain LFTs has been normal, has been on Lipitor 80 mg daily.     Chronic systolic congestive heart failure (HCC)  Has been mostly asymptomatic. Her  last echo was done recently showed improvement in her EF was 35, now is 45 %.RVSP was 35 mmHg.has been Lasix from 40 mg twice daily, and spironolactone 25 mg daily. She follows up with cardiology Dr Simmons.     CKD (chronic kidney disease) stage 3, GFR 30-59 ml/min  She has been asymptomatic. Her last eGFR was 30. Asymptomatic.Discussed a referral to nephrology, patient preferred to wait and watch if it gets to stage 4.     Chronic atrial fibrillation (HCC)  Has been asymptomatic, denies chest pain, palpitation, and SOB..Controlled rate and rhythm. Has been  on Eliquis      Patient Active Problem List    Diagnosis Date Noted   • Physical deconditioning 03/24/2016     Priority: High   • Acute kidney injury (HCC) 03/24/2016     Priority: High   • Generalized weakness  03/19/2016     Priority: High   • Nephritis and nephropathy, not specified as acute or chronic, with other specified pathological lesion in kidney, in diseases classified elsewhere 09/08/2014     Priority: High   • Acute renal failure superimposed on stage 3 chronic kidney disease ([facility]) 03/19/2016     Priority: Medium   • Hyponatremia 03/18/2016     Priority: Medium   • Aortic atherosclerosis ([facility]) 04/28/2015     Priority: Medium   • CKD (chronic kidney disease) stage 3, GFR 30-59 ml/min ([facility]) 10/07/2014     Priority: Medium   • Peripheral neuropathy 09/10/2009     Priority: Medium   • HTN (hypertension), benign      Priority: Medium   • CHF (congestive heart failure) ([facility])      Priority: Medium   • DJD (degenerative joint disease)      Priority: Medium   • Hyperlipidemia      Priority: Low   • CAD (coronary artery disease)      Priority: Low   • Type 2 diabetes mellitus without complication, with long-term current use of insulin ([facility]) 03/20/2017   • HTN (hypertension) 11/23/2016   • DM type 2 (diabetes mellitus, type 2) ([facility]) 11/23/2016   • Syncope 11/22/2016   • Acute systolic congestive heart failure, NYHA class 2 ([facility]) 11/22/2016   • 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   • Aspiration into airway 07/18/2016   • Acute respiratory failure ([facility]) 07/18/2016   • Lower urinary tract infectious disease 07/17/2016   • PAF (paroxysmal atrial fibrillation)-probably reason for Eliquis 07/17/2016   • Leukocytosis 07/16/2016   • Presbyesophagus 07/16/2016   • Hypoglycemia associated with diabetes ([facility]) 07/15/2016   • Medical home 03/25/2016   • Encounter for long-term (current) use of insulin ([facility]) 09/08/2014   • Renal failure 01/09/2013   • Cystocele        Current Outpatient Prescriptions   Medication Sig Dispense Refill   • Blood Glucose Monitoring Suppl Device Meter: Dispense Device of Insurance Preference(freestyle nena meter) . Sig. Use as directed for blood sugar monitoring. #1. NR. 1 Device 0   • insulin glargine (LANTUS)  "100 UNIT/ML Solution Inject 10 Units as instructed every day. 10 mL 6   • Multiple Vitamins-Minerals (MULTI COMPLETE PO) Take  by mouth.     • insulin glargine (LANTUS) 100 UNIT/ML Solution Inject 10 Units as instructed every day. 10 mL 4   • lisinopril (PRINIVIL) 20 MG Tab Take 20 mg by mouth every day.     • furosemide (LASIX) 40 MG Tab Take 1.5 Tabs by mouth every day. (Patient taking differently: Take 40 mg by mouth 2 Times a Day.) 30 Tab    • apixaban (ELIQUIS) 5mg Tab Take 1 Tab by mouth 2 Times a Day. (Patient taking differently: Take 2.5 mg by mouth 2 Times a Day.) 60 Tab 11   • carvedilol (COREG) 25 MG Tab Take 1 Tab by mouth 2 times a day, with meals. 60 Tab 2   • atorvastatin (LIPITOR) 80 MG tablet Take 1 Tab by mouth every morning. 90 Tab 1   • spironolactone (ALDACTONE) 25 MG Tab Take 1 Tab by mouth every day. 30 Tab 3   • docusate sodium (COLACE) 100 MG Cap Take 100 mg by mouth 2 times a day as needed for Constipation. Take 1 capsule by mouth twice daily as needed for constipation for up to 15 days.     • multivitamin (THERAGRAN) Tab Take 1 Tab by mouth every day. 30 Tab 1     No current facility-administered medications for this visit.          Allergies as of 04/10/2019 - Reviewed 04/10/2019   Allergen Reaction Noted   • Tetracaine hcl [tetcaine]  10/29/2014   • Vancomycin  10/29/2014   • Vigamox [moxifloxacin]  10/29/2014        ROS: Denies any chest pain, Shortness of breath, Changes bowel or bladder, Lower extremity edema.    Physical Exam:  /60   Pulse 60   Temp 36.2 °C (97.2 °F)   Ht 1.473 m (4' 10\")   Wt 59.4 kg (131 lb)   SpO2 96%   BMI 27.38 kg/m²   Gen.: Well-developed, well-nourished, no apparent distress,pleasant and cooperative with the examination  Skin:  Warm and dry with good turgor. No rashes or suspicious lesions in visible areas  HEENT:Sinuses nontender with palpation, TMs clear, nares patent with pink mucosa and clear rhinorrhea,no septal deviation ,polyps or lesions. " lips without lesions, oropharynx clear.  Neck: Trachea midline,no masses or adenopathy. No JVD.  Cor: Regular rate and rhythm without murmur, gallop or rub.  Lungs: Respirations unlabored.Clear to auscultation with equal breath sounds bilaterally. No wheezes, rhonchi.  Extremities: No cyanosis, clubbing or edema.      Assessment and Plan.   83 y.o. female     1. Type 2 diabetes mellitus without complication, with long-term current use of insulin (HCC)  Adequately controlled.A1C today is 6.1.  Discussed with patient a trial of stopping the Lantus.  But she wants to stop it gradually, she stated that she stopped it before and her A1c went up.  So she will start with 8 units at night.    - Diabetic Monofilament LE Exam  - POCT  A1C    2. HTN (hypertension), benign  Has been adequately controlled on current medication. Denies headache, chest pain, and SOB.  Continue on lisinopril 20 mg daily, Spironolactone 25 mg daily, and Carvedilol 25 mg bid.     3. Mixed hyperlipidemia  He has been tolerating the statin. Denies muscle pain LFTs has been normal  Continue on Lipitor 80 mg daily.     4. CKD (chronic kidney disease) stage 3, GFR 30-59 ml/min  Last eGFR was 30. Asymptomatic.Discussed a referral to nephrology, patient preferred to wait and watch if it gets to stage 4.  Continue to monitor kidney function.     5. Chronic atrial fibrillation (HCC)  Stable, asymptomatic.Controlled rate and rhythm.    Continue  on Eliquis   Continue follow-up with cardiology.     6. Chronic systolic congestive heart failure (HCC)/ Cardiomyopathy, unspecified  Last echo was done recently showed EF of 45 %.RVSP was 35 mmHg.(EF improved , was 35 %),  Hypokinetics apx. 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  Continue Lasix from 40 mg twice daily, and spironolactone 25 mg daily  She follows up with cardiology .    7. Toe pain, left  Resolved, possibly gout.  Patient advised to RTC if symptoms come back.    8.  Secondary hyperparathyroidism of renal origin (HCC)  Stable. Last PTH checked in 2/2016 was high, GFR at that time was 20.However patient has been having normal Calcium.

## 2019-05-03 ENCOUNTER — PATIENT OUTREACH (OUTPATIENT)
Dept: HEALTH INFORMATION MANAGEMENT | Facility: OTHER | Age: 84
End: 2019-05-03

## 2019-05-03 NOTE — PROGRESS NOTES
Outcome: Left Message    Please transfer to Patient Outreach Team at [phone] when patient returns call.    WebIZ Checked & Epic Updated:  yes    [facility]Connect Verified: yes    Attempt # 1

## 2019-05-03 NOTE — PROGRESS NOTES
Outcome: Pt requested a call back another day, busy.    Please transfer to Patient Outreach Team at [phone] when patient returns call.    Attempt # 2

## 2019-06-11 ENCOUNTER — HOSPITAL ENCOUNTER (OUTPATIENT)
Dept: CARDIOLOGY | Facility: MEDICAL CENTER | Age: 84
End: 2019-06-11
Attending: INTERNAL MEDICINE
Payer: MEDICARE

## 2019-06-11 ENCOUNTER — HOSPITAL ENCOUNTER (OUTPATIENT)
Dept: LAB | Facility: MEDICAL CENTER | Age: 84
End: 2019-06-11
Attending: INTERNAL MEDICINE
Payer: MEDICARE

## 2019-06-11 DIAGNOSIS — I42.9 FAMILIAL CARDIOMYOPATHY (HCC): ICD-10-CM

## 2019-06-11 LAB
ALBUMIN SERPL BCP-MCNC: 3.6 G/DL (ref 3.2–4.9)
ALBUMIN/GLOB SERPL: 0.9 G/DL
ALP SERPL-CCNC: 66 U/L (ref 30–99)
ALT SERPL-CCNC: 15 U/L (ref 2–50)
ANION GAP SERPL CALC-SCNC: 9 MMOL/L (ref 0–11.9)
AST SERPL-CCNC: 24 U/L (ref 12–45)
BILIRUB SERPL-MCNC: 0.7 MG/DL (ref 0.1–1.5)
BUN SERPL-MCNC: 41 MG/DL (ref 8–22)
CALCIUM SERPL-MCNC: 9.7 MG/DL (ref 8.5–10.5)
CHLORIDE SERPL-SCNC: 104 MMOL/L (ref 96–112)
CHOLEST SERPL-MCNC: 101 MG/DL (ref 100–199)
CO2 SERPL-SCNC: 26 MMOL/L (ref 20–33)
CREAT SERPL-MCNC: 1.42 MG/DL (ref 0.5–1.4)
EST. AVERAGE GLUCOSE BLD GHB EST-MCNC: 143 MG/DL
GLOBULIN SER CALC-MCNC: 3.8 G/DL (ref 1.9–3.5)
GLUCOSE SERPL-MCNC: 156 MG/DL (ref 65–99)
HBA1C MFR BLD: 6.6 % (ref 0–5.6)
HDLC SERPL-MCNC: 39 MG/DL
LDLC SERPL CALC-MCNC: 49 MG/DL
LV EJECT FRACT  99904: 45
LV EJECT FRACT MOD 2C 99903: 50.26
LV EJECT FRACT MOD 4C 99902: 44.63
LV EJECT FRACT MOD BP 99901: 45.38
POTASSIUM SERPL-SCNC: 3.9 MMOL/L (ref 3.6–5.5)
PROT SERPL-MCNC: 7.4 G/DL (ref 6–8.2)
SODIUM SERPL-SCNC: 139 MMOL/L (ref 135–145)
TRIGL SERPL-MCNC: 66 MG/DL (ref 0–149)

## 2019-06-11 PROCEDURE — 83036 HEMOGLOBIN GLYCOSYLATED A1C: CPT

## 2019-06-11 PROCEDURE — 83880 ASSAY OF NATRIURETIC PEPTIDE: CPT

## 2019-06-11 PROCEDURE — 93306 TTE W/DOPPLER COMPLETE: CPT

## 2019-06-11 PROCEDURE — 93306 TTE W/DOPPLER COMPLETE: CPT | Mod: 26 | Performed by: INTERNAL MEDICINE

## 2019-06-11 PROCEDURE — 80053 COMPREHEN METABOLIC PANEL: CPT

## 2019-06-11 PROCEDURE — 36415 COLL VENOUS BLD VENIPUNCTURE: CPT

## 2019-06-11 PROCEDURE — 80061 LIPID PANEL: CPT

## 2019-06-11 PROCEDURE — 700117 HCHG RX CONTRAST REV CODE 255: Performed by: INTERNAL MEDICINE

## 2019-06-11 RX ADMIN — HUMAN ALBUMIN MICROSPHERES AND PERFLUTREN 3 ML: 10; .22 INJECTION, SOLUTION INTRAVENOUS at 13:03

## 2019-06-12 LAB — BNP SERPL-MCNC: 832 PG/ML (ref 0–100)

## 2019-07-03 NOTE — PROGRESS NOTES
1. [facility]Connect Verified: yes    2. Verify PCP: yes    3. Review and add  to Care Team: yes    4.  Reviewed/Updated the following with patient:       •   Communication Preference Obtained? YES  • MyChart Activation: declined       •   E-Mail Address Obtained? NO       •   Appointment Day and Time Preferences? YES       •   Preferred Pharmacy? YES       •   Preferred Lab? YES    5. Care Gap Scheduling (Attempt to Schedule EACH Overdue Care Gap!)    Scheduled patient for Annual Wellness Visit       Review Care Team: yes         •   Family History (document living status of immediate family members and if + hx of cancer, diabetes, hypertension, hyperlipidemia, heart attack, stroke) NO/ Recently reviewed.    Patient was advised: “This is a free wellness visit. The provider will screen for medical conditions to help you stay healthy. If you have other concerns to address you may be asked to discuss these at a separate visit or there may be an additional fee.”     Patient was informed to arrive 15 min prior to their scheduled appointment and bring in their medication bottles.

## 2019-11-06 SDOH — ECONOMIC STABILITY: FOOD INSECURITY: WITHIN THE PAST 12 MONTHS, YOU WORRIED THAT YOUR FOOD WOULD RUN OUT BEFORE YOU GOT MONEY TO BUY MORE.: NEVER TRUE

## 2019-11-06 SDOH — ECONOMIC STABILITY: FOOD INSECURITY: WITHIN THE PAST 12 MONTHS, THE FOOD YOU BOUGHT JUST DIDN'T LAST AND YOU DIDN'T HAVE MONEY TO GET MORE.: NEVER TRUE

## 2019-11-06 NOTE — PROGRESS NOTES
1. Attempt #: Final    2. [facility]Connect Verified: yes    3. Verify PCP: yes    4. Review Care Team: yes    5.  Reviewed/Updated the following with patient:       •   Communication Preference Obtained? YES       •   Preferred Pharmacy? YES       •   Preferred Lab? YES       •   Family History (document living status of immediate family members and if + hx of cancer, diabetes, hypertension, hyperlipidemia, heart attack, stroke) YES    7. Annual Wellness Visit Scheduling  · Scheduling Status:Scheduled     8. Care Gap Scheduling (Attempt to Schedule EACH Overdue Care Gap!)     Health Maintenance Due   Topic Date Due   • IMM DTaP/Tdap/Td Vaccine (1 - Tdap) 06/22/1946   • IMM HEP B VACCINE (1 of 3 - Risk 3-dose series) 06/22/1954   • BONE DENSITY  01/23/2013   • RETINAL SCREENING  04/06/2016   • IMM ZOSTER VACCINES (2 of 3) 11/26/2016   • URINE ACR / MICROALBUMIN  08/03/2018   • Annual Wellness Visit  06/30/2019   • IMM INFLUENZA (1) 09/01/2019         9. [employer] Activation: declined    10. [employer] Bud: no    11. Virtual Visits: no    12. Opt In to Text Messages: no    13. Patient was advised: “This is a free wellness visit. The provider will screen for medical conditions to help you stay healthy. If you have other concerns to address you may be asked to discuss these at a separate visit or there may be an additional fee.”     14. Patient was informed to arrive 15 min prior to their scheduled appointment and bring in their medication bottles.

## 2019-11-20 ENCOUNTER — OFFICE VISIT (OUTPATIENT)
Dept: MEDICAL GROUP | Facility: MEDICAL CENTER | Age: 84
End: 2019-11-20
Payer: MEDICARE

## 2019-11-20 VITALS
WEIGHT: 118.2 LBS | HEIGHT: 59 IN | TEMPERATURE: 98.1 F | BODY MASS INDEX: 23.83 KG/M2 | OXYGEN SATURATION: 91 % | DIASTOLIC BLOOD PRESSURE: 60 MMHG | SYSTOLIC BLOOD PRESSURE: 122 MMHG | HEART RATE: 59 BPM

## 2019-11-20 DIAGNOSIS — I48.20 CHRONIC ATRIAL FIBRILLATION (HCC): ICD-10-CM

## 2019-11-20 DIAGNOSIS — Z23 NEED FOR VACCINATION: ICD-10-CM

## 2019-11-20 DIAGNOSIS — E11.9 TYPE 2 DIABETES MELLITUS WITHOUT COMPLICATION, WITH LONG-TERM CURRENT USE OF INSULIN (HCC): ICD-10-CM

## 2019-11-20 DIAGNOSIS — E21.3 HYPERPARATHYROIDISM (HCC): ICD-10-CM

## 2019-11-20 DIAGNOSIS — Z91.81 RISK FOR FALLS: ICD-10-CM

## 2019-11-20 DIAGNOSIS — Z79.4 TYPE 2 DIABETES MELLITUS WITHOUT COMPLICATION, WITH LONG-TERM CURRENT USE OF INSULIN (HCC): ICD-10-CM

## 2019-11-20 DIAGNOSIS — I10 ESSENTIAL HYPERTENSION: ICD-10-CM

## 2019-11-20 DIAGNOSIS — E78.2 MIXED HYPERLIPIDEMIA: ICD-10-CM

## 2019-11-20 DIAGNOSIS — I50.22 CHRONIC SYSTOLIC (CONGESTIVE) HEART FAILURE (HCC): ICD-10-CM

## 2019-11-20 DIAGNOSIS — N18.30 CKD (CHRONIC KIDNEY DISEASE) STAGE 3, GFR 30-59 ML/MIN (HCC): ICD-10-CM

## 2019-11-20 DIAGNOSIS — Z00.00 MEDICARE ANNUAL WELLNESS VISIT, SUBSEQUENT: ICD-10-CM

## 2019-11-20 DIAGNOSIS — Z00.00 HEALTHCARE MAINTENANCE: ICD-10-CM

## 2019-11-20 PROCEDURE — 90662 IIV NO PRSV INCREASED AG IM: CPT | Performed by: FAMILY MEDICINE

## 2019-11-20 PROCEDURE — G0439 PPPS, SUBSEQ VISIT: HCPCS | Performed by: FAMILY MEDICINE

## 2019-11-20 PROCEDURE — G0008 ADMIN INFLUENZA VIRUS VAC: HCPCS | Performed by: FAMILY MEDICINE

## 2019-11-20 ASSESSMENT — ACTIVITIES OF DAILY LIVING (ADL)
BATHING_REQUIRES_ASSISTANCE: 0
TRANSPORTATION COMMENTS: SON HELPS
SHOPPING_COMMENTS: SON HELPS

## 2019-11-20 ASSESSMENT — ENCOUNTER SYMPTOMS: GENERAL WELL-BEING: FAIR

## 2019-11-20 ASSESSMENT — PATIENT HEALTH QUESTIONNAIRE - PHQ9: CLINICAL INTERPRETATION OF PHQ2 SCORE: 0

## 2019-11-20 NOTE — PROGRESS NOTES
Chief Complaint   Patient presents with   • Annual Wellness Visit         HPI:  Ana is a 84 y.o. here for Medicare Annual Wellness Visit    Medicare annual wellness visit, subsequent  Preventive measures and chronic medical history reviewed.    Diabetes mellitus type 2 in nonobese (HCC)  Adequately controlled.A1C today is 6.9.Denies polyuria, polydipsia, and hypoglycemic episode. Has been on Lantus 8 units at night.         HTN (hypertension), benign  Has been adequately controlled on current medication. Denies headache, chest pain, and SOB.Has been on lisinopril 20 mg daily, Spironolactone 25 mg daily, and Carvedilol 25 mg bid.     Mixed hyperlipidemia  She has been tolerating the statin. Denies muscle pain LFTs has been normal, has been on atorvastatin 80 mg daily.  No side effects.     Chronic systolic congestive heart failure (HCC)  Has been mostly asymptomatic. Her  last echo was done recently showed improvement in her EF was 45%, RVSP was 35 mmHg.has been Lasix from 40 mg twice daily, and spironolactone 25 mg daily. She follows up with cardiology Dr Simmons.     CKD (chronic kidney disease) stage 3, GFR 30-59 ml/min  She has been asymptomatic. Her last eGFR showed improvement, currently GFR is 35 was 30 before that.  However patient has been asymptomatic.     Hyperparathyroidism  Last PTH was checked in 2/2016 was 103.4, however her calcium has been.  This probably due to kidney disease.  Continue follow-up for PTH.    Chronic atrial fibrillation (HCC)  Has been asymptomatic, denies chest pain, palpitation, and SOB..Controlled rate and rhythm. Has been  on Eliquis, and carvedilol.    Patient is due for flu vaccine.      Patient Active Problem List    Diagnosis Date Noted   • Physical deconditioning 03/24/2016     Priority: High   • Acute kidney injury (HCC) 03/24/2016     Priority: High   • Generalized weakness 03/19/2016     Priority: High   • Nephritis and nephropathy, not specified as acute or chronic,  with other specified pathological lesion in kidney, in diseases classified elsewhere 09/08/2014     Priority: High   • Acute renal failure superimposed on stage 3 chronic kidney disease ([facility]) 03/19/2016     Priority: Medium   • Hyponatremia 03/18/2016     Priority: Medium   • Aortic atherosclerosis ([facility]) 04/28/2015     Priority: Medium   • CKD (chronic kidney disease) stage 3, GFR 30-59 ml/min ([facility]) 10/07/2014     Priority: Medium   • Peripheral neuropathy 09/10/2009     Priority: Medium   • HTN (hypertension), benign      Priority: Medium   • CHF (congestive heart failure) ([facility])      Priority: Medium   • DJD (degenerative joint disease)      Priority: Medium   • Hyperlipidemia      Priority: Low   • CAD (coronary artery disease)      Priority: Low   • Type 2 diabetes mellitus without complication, with long-term current use of insulin ([facility]) 03/20/2017   • HTN (hypertension) 11/23/2016   • DM type 2 (diabetes mellitus, type 2) ([facility]) 11/23/2016   • Syncope 11/22/2016   • Acute systolic congestive heart failure, NYHA class 2 ([facility]) 11/22/2016   • 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   • Aspiration into airway 07/18/2016   • Acute respiratory failure ([facility]) 07/18/2016   • Lower urinary tract infectious disease 07/17/2016   • PAF (paroxysmal atrial fibrillation)-probably reason for Eliquis 07/17/2016   • Leukocytosis 07/16/2016   • Presbyesophagus 07/16/2016   • Hypoglycemia associated with diabetes ([facility]) 07/15/2016   • Medical home 03/25/2016   • Encounter for long-term (current) use of insulin ([facility]) 09/08/2014   • Renal failure 01/09/2013   • Cystocele        Current Outpatient Medications   Medication Sig Dispense Refill   • Multiple Vitamins-Minerals (MULTI COMPLETE PO) Take  by mouth.     • insulin glargine (LANTUS) 100 UNIT/ML Solution Inject 10 Units as instructed every day. 10 mL 4   • lisinopril (PRINIVIL) 20 MG Tab Take 20 mg by mouth every day.     • furosemide (LASIX) 40 MG Tab Take 1.5 Tabs by mouth every day.  (Patient taking differently: Take 40 mg by mouth 2 Times a Day.) 30 Tab    • apixaban (ELIQUIS) 5mg Tab Take 1 Tab by mouth 2 Times a Day. (Patient taking differently: Take 2.5 mg by mouth 2 Times a Day.) 60 Tab 11   • carvedilol (COREG) 25 MG Tab Take 1 Tab by mouth 2 times a day, with meals. 60 Tab 2   • atorvastatin (LIPITOR) 80 MG tablet Take 1 Tab by mouth every morning. 90 Tab 1   • spironolactone (ALDACTONE) 25 MG Tab Take 1 Tab by mouth every day. 30 Tab 3   • docusate sodium (COLACE) 100 MG Cap Take 100 mg by mouth 2 times a day as needed for Constipation. Take 1 capsule by mouth twice daily as needed for constipation for up to 15 days.     • Blood Glucose Monitoring Suppl Device Meter: Dispense Device of Insurance Preference(freestyle nena meter) . Sig. Use as directed for blood sugar monitoring. #1. NR. 1 Device 0   • insulin glargine (LANTUS) 100 UNIT/ML Solution Inject 10 Units as instructed every day. 10 mL 6   • multivitamin (THERAGRAN) Tab Take 1 Tab by mouth every day. (Patient not taking: Reported on 11/20/2019) 30 Tab 1     No current facility-administered medications for this visit.         Patient is taking medications as noted in medication list.  Current supplements as per medication list.     Allergies: Tetracaine hcl [tetcaine]; Vancomycin; and Vigamox [moxifloxacin]    Current social contact/activities: PT states she lives alone and her son lives in [place].     Is patient current with immunizations? No, due for FLU. Patient is interested in receiving FLU today.    She  reports that she has never smoked. She has never used smokeless tobacco. She reports that she does not drink alcohol or use drugs.  Counseling given: Not Answered        DPA/Advanced directive: Patient has Advanced Directive on file.     ROS:    Gait: Uses a walker   Ostomy: No   Other tubes: No   Amputations: No   Chronic oxygen use No   Last eye exam This year - will request records   Wears hearing aids: No   : Reports  urinary leakage during the last 6 months that has interfered a lot with their daily activities or sleep.      Screening:    DIABETES    Has patient ever had diabetes education? Yes, and is NOT interested in more at this time.      Depression Screening    Little interest or pleasure in doing things?  0 - not at all  Feeling down, depressed, or hopeless? 0 - not at all  Patient Health Questionnaire Score: 0    If depressive symptoms identified deferred to follow up visit unless specifically addressed in assessment and plan.    Interpretation of PHQ-9 Total Score   Score Severity   1-4 No Depression   5-9 Mild Depression   10-14 Moderate Depression   15-19 Moderately Severe Depression   20-27 Severe Depression    Screening for Cognitive Impairment    Three Minute Recall (village, kitchen, baby)  3/3    Jl clock face with all 12 numbers and set the hands to show 10 past 10.  Yes 5/5  If cognitive concerns identified, deferred for follow up unless specifically addressed in assessment and plan.    Fall Risk Assessment    Has the patient had two or more falls in the last year or any fall with injury in the last year?  Yes  If fall risk identified, deferred for follow up unless specifically addressed in assessment and plan.    Safety Assessment    Throw rugs on floor.  No  Handrails on all stairs.  Yes  Good lighting in all hallways.  Yes  Difficulty hearing.  No  Patient counseled about all safety risks that were identified.    Functional Assessment ADLs    Are there any barriers preventing you from cooking for yourself or meeting nutritional needs?  Yes.    Are there any barriers preventing you from driving safely or obtaining transportation?  No. Son helps  Are there any barriers preventing you from using a telephone or calling for help?  No.    Are there any barriers preventing you from shopping?  No. Son helps  Are there any barriers preventing you from taking care of your own finances?  No.    Are there any barriers  preventing you from managing your medications?  No.    Are there any barriers preventing you from showering, bathing or dressing yourself?  No.    Are you currently engaging in any exercise or physical activity?  Yes.  Walking  What is your perception of your health?  Fair.    Health Maintenance Summary                IMM DTaP/Tdap/Td Vaccine Overdue 6/22/1946     IMM HEP B VACCINE Overdue 6/22/1956     BONE DENSITY Overdue 1/23/2013      Done 1/23/2008 DS-BONE DENSITY STUDY (DEXA)     Patient has more history with this topic...    RETINAL SCREENING Overdue 4/6/2016      Done 4/6/2015 REFERRAL FOR RETINAL SCREENING EXAM     Patient has more history with this topic...    IMM ZOSTER VACCINES Overdue 11/26/2016      Done 10/1/2016 Imm Admin: Zoster Vaccine Live (ZVL) (Zostavax)    URINE ACR / MICROALBUMIN Overdue 8/3/2018      Done 8/3/2017 MICROALBUMIN CREAT RATIO URINE     Patient has more history with this topic...    Annual Wellness Visit Overdue 6/30/2019      Done 6/29/2018 Visit Dx: Medicare annual wellness visit, subsequent     Patient has more history with this topic...    IMM INFLUENZA Overdue 9/1/2019     A1C SCREENING Next Due 12/11/2019      Done 6/11/2019 HEMOGLOBIN A1C     Patient has more history with this topic...    DIABETES MONOFILAMENT / LE EXAM Next Due 4/10/2020      Done 4/10/2019 AMB DIABETIC MONOFILAMENT LOWER EXTREMITY EXAM     Patient has more history with this topic...    FASTING LIPID PROFILE Next Due 6/11/2020      Done 6/11/2019 LIPID PROFILE     Patient has more history with this topic...    SERUM CREATININE Next Due 6/11/2020      Done 6/11/2019 COMP METABOLIC PANEL     Patient has more history with this topic...          Patient Care Team:  Rich Coffey M.D. as PCP - General (Geriatrics)  Susanna Golden as    Lui Jones M.D. as Consulting Physician (Cardiology)    Social History     Tobacco Use   • Smoking status: Never Smoker   • Smokeless  tobacco: Never Used   Substance Use Topics   • Alcohol use: No   • Drug use: No     Family History   Problem Relation Age of Onset   • Hypertension Mother    • Heart Disease Mother    • No Known Problems Maternal Grandmother    • No Known Problems Maternal Grandfather    • Diabetes Daughter    • Seizures Son    • No Known Problems Son    • Heart Disease Son      She  has a past medical history of CAD (coronary artery disease), CATARACT, CHF (congestive heart failure) ([facility]), Cystocele, Diabetes, DJD (degenerative joint disease), HTN (hypertension), benign, Hyperlipidemia, Hypertension, Medical home, Myocardial infarct (HCC) (2000), PVD (peripheral vascular disease) ([facility]), Renal disorder, Unspecified urinary incontinence, and Urinary bladder disorder. She also has no past medical history of Breast cancer ([facility]).   Past Surgical History:   Procedure Laterality Date   • CATARACT PHACO WITH IOL  3/12/2014    Performed by Murtaza Casey M.D. at SURGERY SAME DAY [facility] ORS   • MULTIPLE CORONARY ARTERY BYPASS  2004   • ABDOMINAL HYSTERECTOMY TOTAL             Exam:     There were no vitals taken for this visit. There is no height or weight on file to calculate BMI.    Hearing, good   Dentition ., good  Alert, oriented in no acute distress.  Eye contact is good, speech goal directed, affect calm      Assessment and Plan. The following treatment and monitoring plan is recommended:    84 y.o. female     1. Medicare annual wellness visit, subsequent  Preventive measures and chronic medical history reviewed.    - Subsequent Annual Wellness Visit - Includes PPPS ()    2. Type 2 diabetes mellitus without complication, with long-term current use of insulin ([facility])  Stable.  A1c today is 6.9  Continue on Lantus at 8 units.    - POCT A1C  - MICROALBUMIN CREAT RATIO URINE; Future  - Subsequent Annual Wellness Visit - Includes PPPS ()    3. Essential hypertension  Controlled  Continue on lisinopril 20 mg daily,  Spironolactone 25 mg daily, and Carvedilol 25 mg bid.    - Subsequent Annual Wellness Visit - Includes PPPS ()    4. Mixed hyperlipidemia  He has been tolerating the statin. Denies muscle pain LFTs has been normal  Continue on Lipitor 80 mg daily.     - Subsequent Annual Wellness Visit - Includes PPPS ()    5. Chronic atrial fibrillation  Stable.  No RVR.  Continue  on Eliquis, and carvedilol  Continue follow-up with cardiology.    - Subsequent Annual Wellness Visit - Includes PPPS ()    6. CKD (chronic kidney disease) stage 3, GFR 30-59 ml/min ([facility])  Improved kidney functions last eGFR was 35 it was 30 before that, and the creatinine is 1.42.  Patient has been asymptomatic.  Recommend hydration and avoiding nephrotoxic medication.    Continue to monitor kidney function.     - Subsequent Annual Wellness Visit - Includes PPPS ()    7. Chronic systolic (congestive) heart failure (HCC)  Stable.  Asymptomatic.  Last echo was done recently showed EF of 45 %.RVSP was 35 mmHg.(EF improved , was 35 %),  Hypokinetics apx. Probably ischemic cardiomyopathy. treatment  Continue Lasix from 40 mg twice daily, and spironolactone 25 mg daily  She follows up with cardiology .    - Subsequent Annual Wellness Visit - Includes PPPS ()    8. Hyperparathyroidism  Last PTH was checked in 2/2016 was 103.4, however her calcium has been.  This probably due to kidney disease.    Continue monitor her PTH.    - Subsequent Annual Wellness Visit - Includes PPPS ()  -Intact PTH    9. Healthcare maintenance  Due for flu shot, given    - Subsequent Annual Wellness Visit - Includes PPPS ()    10. Need for vaccination  Flu shot is given today  - INFLUENZA VACCINE, HIGH DOSE (65+ ONLY)  - Subsequent Annual Wellness Visit - Includes PPPS ()    11. Risk for falls  Discussed precautions and risk factors for falls.  Currently using a walker.    - Subsequent Annual Wellness Visit - Includes PPPS ()  - Patient  identified as fall risk.  Appropriate orders and counseling given.      Services suggested:  Health Care Screening recommendations as per orders if indicated.  Referrals offered: PT/OT/Nutrition counseling/Behavioral Health/Smoking cessation as per orders if indicated.    Discussion today about general wellness and lifestyle habits:    · Prevent falls and reduce trip hazards; Cautioned about securing or removing rugs.  · Have a working fire alarm and carbon monoxide detector;   · Engage in regular physical activity and social activities       Follow-up: No follow-ups on file.

## 2019-11-25 ENCOUNTER — HOSPITAL ENCOUNTER (OUTPATIENT)
Dept: LAB | Facility: MEDICAL CENTER | Age: 84
End: 2019-11-25
Attending: INTERNAL MEDICINE
Payer: MEDICARE

## 2019-11-25 LAB
ANION GAP SERPL CALC-SCNC: 12 MMOL/L (ref 0–11.9)
BUN SERPL-MCNC: 44 MG/DL (ref 8–22)
CALCIUM SERPL-MCNC: 10 MG/DL (ref 8.5–10.5)
CHLORIDE SERPL-SCNC: 101 MMOL/L (ref 96–112)
CO2 SERPL-SCNC: 24 MMOL/L (ref 20–33)
CREAT SERPL-MCNC: 1.34 MG/DL (ref 0.5–1.4)
FASTING STATUS PATIENT QL REPORTED: NORMAL
GLUCOSE SERPL-MCNC: 83 MG/DL (ref 65–99)
NT-PROBNP SERPL IA-MCNC: 5147 PG/ML (ref 0–125)
POTASSIUM SERPL-SCNC: 5 MMOL/L (ref 3.6–5.5)
SODIUM SERPL-SCNC: 137 MMOL/L (ref 135–145)

## 2019-11-25 PROCEDURE — 83880 ASSAY OF NATRIURETIC PEPTIDE: CPT

## 2019-11-25 PROCEDURE — 36415 COLL VENOUS BLD VENIPUNCTURE: CPT

## 2019-11-25 PROCEDURE — 80048 BASIC METABOLIC PNL TOTAL CA: CPT

## 2020-03-06 ENCOUNTER — HOSPITAL ENCOUNTER (OUTPATIENT)
Dept: LAB | Facility: MEDICAL CENTER | Age: 85
End: 2020-03-06
Attending: FAMILY MEDICINE
Payer: MEDICARE

## 2020-03-06 DIAGNOSIS — Z79.4 TYPE 2 DIABETES MELLITUS WITHOUT COMPLICATION, WITH LONG-TERM CURRENT USE OF INSULIN (HCC): ICD-10-CM

## 2020-03-06 DIAGNOSIS — E11.9 TYPE 2 DIABETES MELLITUS WITHOUT COMPLICATION, WITH LONG-TERM CURRENT USE OF INSULIN (HCC): ICD-10-CM

## 2020-03-06 DIAGNOSIS — E21.3 HYPERPARATHYROIDISM (HCC): ICD-10-CM

## 2020-03-06 LAB
ANION GAP SERPL CALC-SCNC: 7 MMOL/L (ref 0–11.9)
BUN SERPL-MCNC: 43 MG/DL (ref 8–22)
CALCIUM SERPL-MCNC: 9.8 MG/DL (ref 8.5–10.5)
CHLORIDE SERPL-SCNC: 103 MMOL/L (ref 96–112)
CO2 SERPL-SCNC: 27 MMOL/L (ref 20–33)
CREAT SERPL-MCNC: 1.55 MG/DL (ref 0.5–1.4)
EST. AVERAGE GLUCOSE BLD GHB EST-MCNC: 134 MG/DL
FASTING STATUS PATIENT QL REPORTED: NORMAL
GLUCOSE SERPL-MCNC: 66 MG/DL (ref 65–99)
HBA1C MFR BLD: 6.3 % (ref 0–5.6)
NT-PROBNP SERPL IA-MCNC: 7864 PG/ML (ref 0–125)
POTASSIUM SERPL-SCNC: 4.6 MMOL/L (ref 3.6–5.5)
SODIUM SERPL-SCNC: 137 MMOL/L (ref 135–145)

## 2020-03-06 PROCEDURE — 36415 COLL VENOUS BLD VENIPUNCTURE: CPT

## 2020-03-06 PROCEDURE — 83880 ASSAY OF NATRIURETIC PEPTIDE: CPT

## 2020-03-06 PROCEDURE — 80048 BASIC METABOLIC PNL TOTAL CA: CPT

## 2020-03-06 PROCEDURE — 83036 HEMOGLOBIN GLYCOSYLATED A1C: CPT

## 2020-04-01 RX ORDER — INSULIN GLARGINE 100 [IU]/ML
INJECTION, SOLUTION SUBCUTANEOUS
Qty: 10 ML | Refills: 6 | Status: SHIPPED
Start: 2020-04-01 | End: 2020-09-10

## 2020-04-02 DIAGNOSIS — I50.22 CHRONIC SYSTOLIC CONGESTIVE HEART FAILURE (HCC): ICD-10-CM

## 2020-04-02 RX ORDER — CARVEDILOL 25 MG/1
TABLET ORAL
Qty: 180 TAB | Refills: 3 | Status: SHIPPED
Start: 2020-04-02 | End: 2020-09-10

## 2020-04-28 ENCOUNTER — TELEPHONE (OUTPATIENT)
Dept: HEALTH INFORMATION MANAGEMENT | Facility: OTHER | Age: 85
End: 2020-04-28

## 2020-04-28 NOTE — TELEPHONE ENCOUNTER
"1. Caller Name: Ana Torres                        Call Back Number: [phone]  [facility] PCP or Specialty Provider: Yes Dr. Jones        2.  Does patient have any active symptoms of respiratory illness? No.    3.  Does patient have any comoribidities? CHF, DM and ESRD    4.  Has the patient traveled in the last 14 days OR had any known contact with someone who is suspected or confirmed to have COVID-19?  No.    5. Disposition: Advised to go to ED or call 9-1-1    Symptoms of SOB and edema started yesterday.  Patient is taking  furosemide 60 mg in the morning and 60 mg at noon for the last  6 years and then it stopped working. Yesterday she could not breath and was \"huffing and puffing\".  Patient checks weight daily and she had a 5 pound weight gain overnight last night.      Pt is reporting she feels better today and has \"even cleaned her house\".  She states her son could come over after he gets off work but she does not want to bother him.      Note routed to [facility] Provider: Provider action needed: CHF protocol for this patient?           This RN called Dr. Lui Jones's office and spoke with Suzanne who referred patient to ER.  Called Dr. Coffey's office and left message with MA.    Patient was given instruction to go to ER but she has refused.  Patient given DISPATCH number [phone].    "

## 2020-04-29 ENCOUNTER — TELEPHONE (OUTPATIENT)
Dept: MEDICAL GROUP | Facility: MEDICAL CENTER | Age: 85
End: 2020-04-29

## 2020-04-29 ENCOUNTER — TELEPHONE (OUTPATIENT)
Dept: HEALTH INFORMATION MANAGEMENT | Facility: OTHER | Age: 85
End: 2020-04-29

## 2020-05-01 ENCOUNTER — TELEPHONE (OUTPATIENT)
Dept: MEDICAL GROUP | Facility: MEDICAL CENTER | Age: 85
End: 2020-05-01

## 2020-05-01 DIAGNOSIS — I50.22 CHRONIC SYSTOLIC CONGESTIVE HEART FAILURE (HCC): ICD-10-CM

## 2020-05-01 DIAGNOSIS — I48.20 CHRONIC ATRIAL FIBRILLATION (HCC): ICD-10-CM

## 2020-05-01 NOTE — TELEPHONE ENCOUNTER
patient need to be evaluated. ER/ or urgent care. He is on hiogh dose of lasix without potassium coverage, need top be evaluated.

## 2020-05-01 NOTE — TELEPHONE ENCOUNTER
"Patient is experiencing leg swelling and SOB on exertion and she states there is \"Water around her heart\"    Refused ER or UC       Lasix at 60 mg bid      No Potassium at this time    [phone]   "

## 2020-05-04 ENCOUNTER — TELEPHONE (OUTPATIENT)
Dept: HEALTH INFORMATION MANAGEMENT | Facility: OTHER | Age: 85
End: 2020-05-04

## 2020-05-04 NOTE — TELEPHONE ENCOUNTER
Outcome:Declined service    Please transfer to Patient Outreach Team at [phone] when patient returns call.    Attempt # 1

## 2020-05-04 NOTE — TELEPHONE ENCOUNTER
Patient refuses ER and [facility] visit, she does not want an office visit as well.    She is requesting a new Cardiologist as Dr Jones is no longer with [facility]

## 2020-07-06 DIAGNOSIS — I50.22 CHRONIC SYSTOLIC CONGESTIVE HEART FAILURE (HCC): ICD-10-CM

## 2020-07-06 RX ORDER — SPIRONOLACTONE 25 MG/1
25 TABLET ORAL DAILY
Qty: 90 TAB | Refills: 3 | Status: SHIPPED
Start: 2020-07-06 | End: 2020-09-10

## 2020-07-21 RX ORDER — LISINOPRIL 20 MG/1
20 TABLET ORAL DAILY
Qty: 30 TAB | Refills: 3 | Status: SHIPPED | OUTPATIENT
Start: 2020-07-21 | End: 2020-07-22 | Stop reason: SDUPTHER

## 2020-07-22 RX ORDER — LISINOPRIL 20 MG/1
20 TABLET ORAL DAILY
Qty: 100 TAB | Refills: 1 | Status: SHIPPED
Start: 2020-07-22 | End: 2020-09-10

## 2020-09-10 ENCOUNTER — HOSPITAL ENCOUNTER (INPATIENT)
Facility: MEDICAL CENTER | Age: 85
LOS: 4 days | DRG: 637 | End: 2020-09-14
Attending: EMERGENCY MEDICINE | Admitting: INTERNAL MEDICINE
Payer: MEDICARE

## 2020-09-10 ENCOUNTER — APPOINTMENT (OUTPATIENT)
Dept: RADIOLOGY | Facility: MEDICAL CENTER | Age: 85
DRG: 637 | End: 2020-09-10
Attending: EMERGENCY MEDICINE
Payer: MEDICARE

## 2020-09-10 DIAGNOSIS — R55 SYNCOPE, UNSPECIFIED SYNCOPE TYPE: ICD-10-CM

## 2020-09-10 DIAGNOSIS — R53.81 PHYSICAL DECONDITIONING: ICD-10-CM

## 2020-09-10 DIAGNOSIS — E16.2 HYPOGLYCEMIA: ICD-10-CM

## 2020-09-10 DIAGNOSIS — R53.1 GENERALIZED WEAKNESS: ICD-10-CM

## 2020-09-10 DIAGNOSIS — R00.1 BRADYCARDIA: ICD-10-CM

## 2020-09-10 DIAGNOSIS — I95.9 HYPOTENSION, UNSPECIFIED HYPOTENSION TYPE: ICD-10-CM

## 2020-09-10 PROBLEM — I48.91 ATRIAL FIBRILLATION (HCC): Status: ACTIVE | Noted: 2020-09-10
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
## 2020-09-10 LAB
ALBUMIN SERPL BCP-MCNC: 3.5 G/DL (ref 3.2–4.9)
ALBUMIN/GLOB SERPL: 1.1 G/DL
ALP SERPL-CCNC: 89 U/L (ref 30–99)
ALT SERPL-CCNC: 31 U/L (ref 2–50)
ANION GAP SERPL CALC-SCNC: 16 MMOL/L (ref 7–16)
APPEARANCE UR: ABNORMAL
AST SERPL-CCNC: 31 U/L (ref 12–45)
BACTERIA #/AREA URNS HPF: ABNORMAL /HPF
BASOPHILS # BLD AUTO: 0.2 % (ref 0–1.8)
BASOPHILS # BLD: 0.02 K/UL (ref 0–0.12)
BILIRUB SERPL-MCNC: 1.1 MG/DL (ref 0.1–1.5)
BILIRUB UR QL STRIP.AUTO: NEGATIVE
BUN SERPL-MCNC: 50 MG/DL (ref 8–22)
CALCIUM SERPL-MCNC: 9.6 MG/DL (ref 8.5–10.5)
CHLORIDE SERPL-SCNC: 99 MMOL/L (ref 96–112)
CO2 SERPL-SCNC: 25 MMOL/L (ref 20–33)
COLOR UR: YELLOW
CREAT SERPL-MCNC: 1.1 MG/DL (ref 0.5–1.4)
EKG IMPRESSION: NORMAL
EOSINOPHIL # BLD AUTO: 0.01 K/UL (ref 0–0.51)
EOSINOPHIL NFR BLD: 0.1 % (ref 0–6.9)
EPI CELLS #/AREA URNS HPF: NEGATIVE /HPF
ERYTHROCYTE [DISTWIDTH] IN BLOOD BY AUTOMATED COUNT: 49.2 FL (ref 35.9–50)
GLOBULIN SER CALC-MCNC: 3.2 G/DL (ref 1.9–3.5)
GLUCOSE BLD-MCNC: 57 MG/DL (ref 65–99)
GLUCOSE BLD-MCNC: 61 MG/DL (ref 65–99)
GLUCOSE SERPL-MCNC: 111 MG/DL (ref 65–99)
GLUCOSE UR STRIP.AUTO-MCNC: NEGATIVE MG/DL
HCT VFR BLD AUTO: 40.3 % (ref 37–47)
HGB BLD-MCNC: 13.3 G/DL (ref 12–16)
HYALINE CASTS #/AREA URNS LPF: ABNORMAL /LPF
IMM GRANULOCYTES # BLD AUTO: 0.04 K/UL (ref 0–0.11)
IMM GRANULOCYTES NFR BLD AUTO: 0.5 % (ref 0–0.9)
INR PPP: 1.3 (ref 0.87–1.13)
KETONES UR STRIP.AUTO-MCNC: NEGATIVE MG/DL
LEUKOCYTE ESTERASE UR QL STRIP.AUTO: ABNORMAL
LYMPHOCYTES # BLD AUTO: 0.53 K/UL (ref 1–4.8)
LYMPHOCYTES NFR BLD: 6 % (ref 22–41)
MCH RBC QN AUTO: 32.2 PG (ref 27–33)
MCHC RBC AUTO-ENTMCNC: 33 G/DL (ref 33.6–35)
MCV RBC AUTO: 97.6 FL (ref 81.4–97.8)
MICRO URNS: ABNORMAL
MONOCYTES # BLD AUTO: 0.43 K/UL (ref 0–0.85)
MONOCYTES NFR BLD AUTO: 4.9 % (ref 0–13.4)
NEUTROPHILS # BLD AUTO: 7.8 K/UL (ref 2–7.15)
NEUTROPHILS NFR BLD: 88.3 % (ref 44–72)
NITRITE UR QL STRIP.AUTO: NEGATIVE
NRBC # BLD AUTO: 0 K/UL
NRBC BLD-RTO: 0 /100 WBC
PH UR STRIP.AUTO: 5 [PH] (ref 5–8)
PLATELET # BLD AUTO: 142 K/UL (ref 164–446)
PMV BLD AUTO: 11.6 FL (ref 9–12.9)
POTASSIUM SERPL-SCNC: 3.9 MMOL/L (ref 3.6–5.5)
PROT SERPL-MCNC: 6.7 G/DL (ref 6–8.2)
PROT UR QL STRIP: 30 MG/DL
PROTHROMBIN TIME: 16.6 SEC (ref 12–14.6)
RBC # BLD AUTO: 4.13 M/UL (ref 4.2–5.4)
RBC # URNS HPF: ABNORMAL /HPF
RBC UR QL AUTO: ABNORMAL
SODIUM SERPL-SCNC: 140 MMOL/L (ref 135–145)
SP GR UR STRIP.AUTO: 1.01
TROPONIN T SERPL-MCNC: 53 NG/L (ref 6–19)
UROBILINOGEN UR STRIP.AUTO-MCNC: 0.2 MG/DL
WBC # BLD AUTO: 8.8 K/UL (ref 4.8–10.8)
WBC #/AREA URNS HPF: ABNORMAL /HPF

## 2020-09-10 PROCEDURE — 770020 HCHG ROOM/CARE - TELE (206)

## 2020-09-10 PROCEDURE — 700102 HCHG RX REV CODE 250 W/ 637 OVERRIDE(OP): Performed by: STUDENT IN AN ORGANIZED HEALTH CARE EDUCATION/TRAINING PROGRAM

## 2020-09-10 PROCEDURE — 99285 EMERGENCY DEPT VISIT HI MDM: CPT

## 2020-09-10 PROCEDURE — A9270 NON-COVERED ITEM OR SERVICE: HCPCS | Performed by: STUDENT IN AN ORGANIZED HEALTH CARE EDUCATION/TRAINING PROGRAM

## 2020-09-10 PROCEDURE — 81001 URINALYSIS AUTO W/SCOPE: CPT

## 2020-09-10 PROCEDURE — 51701 INSERT BLADDER CATHETER: CPT

## 2020-09-10 PROCEDURE — 85610 PROTHROMBIN TIME: CPT

## 2020-09-10 PROCEDURE — 93005 ELECTROCARDIOGRAM TRACING: CPT

## 2020-09-10 PROCEDURE — 85025 COMPLETE CBC W/AUTO DIFF WBC: CPT

## 2020-09-10 PROCEDURE — 36415 COLL VENOUS BLD VENIPUNCTURE: CPT

## 2020-09-10 PROCEDURE — 84484 ASSAY OF TROPONIN QUANT: CPT

## 2020-09-10 PROCEDURE — 70450 CT HEAD/BRAIN W/O DYE: CPT

## 2020-09-10 PROCEDURE — 700105 HCHG RX REV CODE 258: Performed by: EMERGENCY MEDICINE

## 2020-09-10 PROCEDURE — 700101 HCHG RX REV CODE 250: Performed by: STUDENT IN AN ORGANIZED HEALTH CARE EDUCATION/TRAINING PROGRAM

## 2020-09-10 PROCEDURE — 82962 GLUCOSE BLOOD TEST: CPT

## 2020-09-10 PROCEDURE — 71045 X-RAY EXAM CHEST 1 VIEW: CPT

## 2020-09-10 PROCEDURE — 96374 THER/PROPH/DIAG INJ IV PUSH: CPT

## 2020-09-10 PROCEDURE — 80053 COMPREHEN METABOLIC PANEL: CPT

## 2020-09-10 PROCEDURE — 93005 ELECTROCARDIOGRAM TRACING: CPT | Performed by: EMERGENCY MEDICINE

## 2020-09-10 PROCEDURE — 700101 HCHG RX REV CODE 250

## 2020-09-10 RX ORDER — POLYETHYLENE GLYCOL 3350 17 G/17G
1 POWDER, FOR SOLUTION ORAL
Status: DISCONTINUED | OUTPATIENT
Start: 2020-09-10 | End: 2020-09-13

## 2020-09-10 RX ORDER — SPIRONOLACTONE 25 MG/1
25 TABLET ORAL EVERY MORNING
Status: ON HOLD | COMMUNITY
End: 2020-09-14

## 2020-09-10 RX ORDER — DEXTROSE MONOHYDRATE 25 G/50ML
INJECTION, SOLUTION INTRAVENOUS
Status: COMPLETED
Start: 2020-09-10 | End: 2020-09-10

## 2020-09-10 RX ORDER — LISINOPRIL 10 MG/1
10 TABLET ORAL
Status: DISCONTINUED | OUTPATIENT
Start: 2020-09-11 | End: 2020-09-14 | Stop reason: HOSPADM

## 2020-09-10 RX ORDER — CARVEDILOL 25 MG/1
25 TABLET ORAL 2 TIMES DAILY
Status: ON HOLD | COMMUNITY
End: 2020-09-14

## 2020-09-10 RX ORDER — DEXTROSE MONOHYDRATE 25 G/50ML
50 INJECTION, SOLUTION INTRAVENOUS
Status: DISCONTINUED | OUTPATIENT
Start: 2020-09-10 | End: 2020-09-14 | Stop reason: HOSPADM

## 2020-09-10 RX ORDER — BISACODYL 10 MG
10 SUPPOSITORY, RECTAL RECTAL
Status: DISCONTINUED | OUTPATIENT
Start: 2020-09-10 | End: 2020-09-13

## 2020-09-10 RX ORDER — SODIUM CHLORIDE 9 MG/ML
INJECTION, SOLUTION INTRAVENOUS CONTINUOUS
Status: DISPENSED | OUTPATIENT
Start: 2020-09-10 | End: 2020-09-10

## 2020-09-10 RX ORDER — FUROSEMIDE 40 MG/1
40 TABLET ORAL 2 TIMES DAILY
Status: ON HOLD | COMMUNITY
End: 2020-09-14

## 2020-09-10 RX ORDER — LISINOPRIL 20 MG/1
20 TABLET ORAL EVERY MORNING
Status: ON HOLD | COMMUNITY
End: 2020-09-14

## 2020-09-10 RX ORDER — INSULIN GLARGINE 100 [IU]/ML
10 INJECTION, SOLUTION SUBCUTANEOUS EVERY EVENING
Status: ON HOLD | COMMUNITY
End: 2020-09-14

## 2020-09-10 RX ORDER — AMOXICILLIN 250 MG
2 CAPSULE ORAL 2 TIMES DAILY
Status: DISCONTINUED | OUTPATIENT
Start: 2020-09-10 | End: 2020-09-13

## 2020-09-10 RX ORDER — ATORVASTATIN CALCIUM 80 MG/1
80 TABLET, FILM COATED ORAL EVERY MORNING
Status: ON HOLD | COMMUNITY
End: 2020-09-14

## 2020-09-10 RX ORDER — SODIUM CHLORIDE 9 MG/ML
500 INJECTION, SOLUTION INTRAVENOUS ONCE
Status: COMPLETED | OUTPATIENT
Start: 2020-09-10 | End: 2020-09-11

## 2020-09-10 RX ORDER — LISINOPRIL 20 MG/1
20 TABLET ORAL
Status: DISCONTINUED | OUTPATIENT
Start: 2020-09-11 | End: 2020-09-10

## 2020-09-10 RX ORDER — ATORVASTATIN CALCIUM 80 MG/1
80 TABLET, FILM COATED ORAL EVERY EVENING
Status: DISCONTINUED | OUTPATIENT
Start: 2020-09-10 | End: 2020-09-14 | Stop reason: HOSPADM

## 2020-09-10 RX ORDER — DEXTROSE MONOHYDRATE 25 G/50ML
50 INJECTION, SOLUTION INTRAVENOUS ONCE
Status: COMPLETED | OUTPATIENT
Start: 2020-09-10 | End: 2020-09-10

## 2020-09-10 RX ORDER — DEXTROSE MONOHYDRATE 25 G/50ML
50 INJECTION, SOLUTION INTRAVENOUS
Status: DISCONTINUED | OUTPATIENT
Start: 2020-09-10 | End: 2020-09-10

## 2020-09-10 RX ORDER — CARVEDILOL 25 MG/1
25 TABLET ORAL 2 TIMES DAILY WITH MEALS
Status: DISCONTINUED | OUTPATIENT
Start: 2020-09-10 | End: 2020-09-10

## 2020-09-10 RX ADMIN — DEXTROSE MONOHYDRATE 50 ML: 25 INJECTION, SOLUTION INTRAVENOUS at 21:47

## 2020-09-10 RX ADMIN — SODIUM CHLORIDE 500 ML: 9 INJECTION, SOLUTION INTRAVENOUS at 15:28

## 2020-09-10 RX ADMIN — APIXABAN 2.5 MG: 2.5 TABLET, FILM COATED ORAL at 22:08

## 2020-09-10 RX ADMIN — DEXTROSE MONOHYDRATE 50 ML: 25 INJECTION, SOLUTION INTRAVENOUS at 14:15

## 2020-09-10 RX ADMIN — ATORVASTATIN CALCIUM 80 MG: 80 TABLET, FILM COATED ORAL at 22:08

## 2020-09-10 ASSESSMENT — ENCOUNTER SYMPTOMS
NERVOUS/ANXIOUS: 0
NAUSEA: 0
FEVER: 0
VOMITING: 0
BLOOD IN STOOL: 0
BACK PAIN: 1
BLURRED VISION: 0
DOUBLE VISION: 0
DIARRHEA: 0
DEPRESSION: 0
CHILLS: 1
SPUTUM PRODUCTION: 0
LOSS OF CONSCIOUSNESS: 0
CONSTIPATION: 0
MYALGIAS: 0
HEADACHES: 0
FOCAL WEAKNESS: 0
DIZZINESS: 0
SORE THROAT: 0
FALLS: 1
WEAKNESS: 0
SHORTNESS OF BREATH: 1
COUGH: 0
ABDOMINAL PAIN: 0

## 2020-09-10 ASSESSMENT — COGNITIVE AND FUNCTIONAL STATUS - GENERAL
SUGGESTED CMS G CODE MODIFIER DAILY ACTIVITY: CK
CLIMB 3 TO 5 STEPS WITH RAILING: A LOT
DRESSING REGULAR LOWER BODY CLOTHING: A LITTLE
DRESSING REGULAR UPPER BODY CLOTHING: A LITTLE
PERSONAL GROOMING: A LITTLE
MOVING FROM LYING ON BACK TO SITTING ON SIDE OF FLAT BED: A LOT
EATING MEALS: A LITTLE
DAILY ACTIVITIY SCORE: 16
TURNING FROM BACK TO SIDE WHILE IN FLAT BAD: A LITTLE
WALKING IN HOSPITAL ROOM: A LOT
STANDING UP FROM CHAIR USING ARMS: A LOT
TOILETING: A LOT
MOBILITY SCORE: 13
SUGGESTED CMS G CODE MODIFIER MOBILITY: CL
HELP NEEDED FOR BATHING: A LOT
MOVING TO AND FROM BED TO CHAIR: A LOT

## 2020-09-10 ASSESSMENT — PAIN SCALES - WONG BAKER: WONGBAKER_NUMERICALRESPONSE: DOESN'T HURT AT ALL

## 2020-09-10 ASSESSMENT — FIBROSIS 4 INDEX: FIB4 SCORE: 3.25

## 2020-09-10 NOTE — ED PROVIDER NOTES
"ED Provider Note    Scribed for Saida Garcia M.D. by Nicolasa Driscoll. 9/10/2020, 2:36 PM.    Primary care provider: Rich Coffey M.D.  Means of arrival: EMS  History obtained from: Patient  History limited by: None    CHIEF COMPLAINT  Ground level fall    HPI  Ana Torres is a 85 y.o. female who presents to the Emergency Department for evaluation following a ground level fall today around 11:00 AM. Patient states she slipped and fell getting out of bed. She reports she lives alone, and that her neighbors called EMS. When patient was found by EMS she was found to have low blood sugar, hypptensive at 70/30 and bradycardic with a pulse of 30. Per nursing staff, the patient was not alert when she first arrived, but later woke up and became more awake after recieving D50.. She denies any abdominal pain, recent sickness, dysuria, or vomiting. She reports having a past medical history of cardiac issues, but that it is \"well controled\". Patient received Atropine and D10 en route by ambulance.  Her main complaint here in the ER is that she is cold.  She also feels a bit diffusely weak.  She denies any chest pain, trouble breathing, dizziness, numbness or weakness in her arms or legs, headache, blurry vision.  Patient denies taking oral diabetes medication.    REVIEW OF SYSTEMS  Pertinent positives include hypoglycemia, hypertensive, and bradycardic, chills. Pertinent negatives include fevers, chest pain, shortness of breath, vomiting, diarrhea, abdominal pain, recent sickness, dysuria, or numbness or weakness to her arms or legs. All other systems reviewed and negative.     PAST MEDICAL HISTORY   has a past medical history of CAD (coronary artery disease), CATARACT, CHF (congestive heart failure) ([facility]), Cystocele, Diabetes, DJD (degenerative joint disease), HTN (hypertension), benign, Hyperlipidemia, Hypertension, Medical home, Myocardial infarct ([facility]) (2000), PVD (peripheral vascular disease) ([facility]), Renal " "disorder, Unspecified urinary incontinence, and Urinary bladder disorder.    SURGICAL HISTORY   has a past surgical history that includes abdominal hysterectomy total; multiple coronary artery bypass (2004); and cataract phaco with iol (3/12/2014).    SOCIAL HISTORY  Social History     Tobacco Use   • Smoking status: Never Smoker   • Smokeless tobacco: Never Used   Substance Use Topics   • Alcohol use: No   • Drug use: No      Social History     Substance and Sexual Activity   Drug Use No       FAMILY HISTORY  Family History   Problem Relation Age of Onset   • Hypertension Mother    • Heart Disease Mother    • No Known Problems Maternal Grandmother    • No Known Problems Maternal Grandfather    • Diabetes Daughter    • Seizures Son    • No Known Problems Son    • Heart Disease Son        CURRENT MEDICATIONS  Home Medications    **Home medications have not yet been reviewed for this encounter**         ALLERGIES  Allergies   Allergen Reactions   • Tetracaine Hcl [Tetcaine]    • Vancomycin    • Vigamox [Moxifloxacin]        PHYSICAL EXAM  VITAL SIGNS: /76   Pulse 81   Temp (!) 35.6 °C (96 °F) (Temporal)   Resp 17   Ht 1.499 m (4' 11\")   Wt 54.4 kg (120 lb)   SpO2 92%   BMI 24.24 kg/m²     Constitutional:  Well developed, No acute distress, seems a little groggy but alert and answers to questions appropriately, non-toxic appearance.   HENT: Normocephalic, Atraumatic, Bilateral external ears normal, Oropharynx moist, No oral exudates, Nose normal. hoarce voice  Eyes: PERRL, EOMI, Conjunctiva normal  Neck: Normal range of motion, No tenderness, Supple, No stridor.   Cardiovascular: Normal heart rate, Normal rhythm  Thorax & Lungs: Normal breath sounds, No respiratory distress, No chest tenderness.   Abdomen: Benign abdominal exam, no guarding no rebound, no pulsatile mass, no tenderness, no distention  Skin: Warm, Dry, No erythema, No rash.  No abrasions or bruises to extremities  Back: No tenderness, No CVA " tenderness.   Extremities: Intact distal pulses, No edema, No tenderness  Neurologic: Alert & oriented x 3, Normal motor function, Normal sensory function, No focal deficits noted. no faical droop, clear speech, good  strength bilaterally, intact sensation throughout,  Symmetrical weakness bilaterally in lower extremities, but able to move legs.   Psychiatric: Appropriate                                                     DIAGNOSTIC STUDIES / PROCEDURES\    LABS  Results for orders placed or performed during the hospital encounter of 09/10/20   CBC WITH DIFFERENTIAL   Result Value Ref Range    WBC 8.8 4.8 - 10.8 K/uL    RBC 4.13 (L) 4.20 - 5.40 M/uL    Hemoglobin 13.3 12.0 - 16.0 g/dL    Hematocrit 40.3 37.0 - 47.0 %    MCV 97.6 81.4 - 97.8 fL    MCH 32.2 27.0 - 33.0 pg    MCHC 33.0 (L) 33.6 - 35.0 g/dL    RDW 49.2 35.9 - 50.0 fL    Platelet Count 142 (L) 164 - 446 K/uL    MPV 11.6 9.0 - 12.9 fL    Neutrophils-Polys 88.30 (H) 44.00 - 72.00 %    Lymphocytes 6.00 (L) 22.00 - 41.00 %    Monocytes 4.90 0.00 - 13.40 %    Eosinophils 0.10 0.00 - 6.90 %    Basophils 0.20 0.00 - 1.80 %    Immature Granulocytes 0.50 0.00 - 0.90 %    Nucleated RBC 0.00 /100 WBC    Neutrophils (Absolute) 7.80 (H) 2.00 - 7.15 K/uL    Lymphs (Absolute) 0.53 (L) 1.00 - 4.80 K/uL    Monos (Absolute) 0.43 0.00 - 0.85 K/uL    Eos (Absolute) 0.01 0.00 - 0.51 K/uL    Baso (Absolute) 0.02 0.00 - 0.12 K/uL    Immature Granulocytes (abs) 0.04 0.00 - 0.11 K/uL    NRBC (Absolute) 0.00 K/uL   PROTHROMBIN TIME   Result Value Ref Range    PT 16.6 (H) 12.0 - 14.6 sec    INR 1.30 (H) 0.87 - 1.13   Comp Metabolic Panel   Result Value Ref Range    Sodium 140 135 - 145 mmol/L    Potassium 3.9 3.6 - 5.5 mmol/L    Chloride 99 96 - 112 mmol/L    Co2 25 20 - 33 mmol/L    Anion Gap 16.0 7.0 - 16.0    Glucose 111 (H) 65 - 99 mg/dL    Bun 50 (H) 8 - 22 mg/dL    Creatinine 1.10 0.50 - 1.40 mg/dL    Calcium 9.6 8.5 - 10.5 mg/dL    AST(SGOT) 31 12 - 45 U/L    ALT(SGPT)  31 2 - 50 U/L    Alkaline Phosphatase 89 30 - 99 U/L    Total Bilirubin 1.1 0.1 - 1.5 mg/dL    Albumin 3.5 3.2 - 4.9 g/dL    Total Protein 6.7 6.0 - 8.2 g/dL    Globulin 3.2 1.9 - 3.5 g/dL    A-G Ratio 1.1 g/dL   TROPONIN   Result Value Ref Range    Troponin T 53 (H) 6 - 19 ng/L   ESTIMATED GFR   Result Value Ref Range    GFR If  57 (A) >60 mL/min/1.73 m 2    GFR If Non  47 (A) >60 mL/min/1.73 m 2   ACCU-CHEK GLUCOSE   Result Value Ref Range    Glucose - Accu-Ck 57 (L) 65 - 99 mg/dL   EKG (NOW)   Result Value Ref Range    Report       [facility] Emergency Dept.    Test Date:  2020-09-10  Pt Name:    MORRO PINZON            Department: ER  MRN:        [id number]                      Room:        12  Gender:     Female                       Technician: [id number]  :        [id number]                   Requested By:ER TRIAGE PROTOCOL  Order #:    [id number]                    Reading MD: Saida Kumari    Measurements  Intervals                                Axis  Rate:       85                           P:  WA:                                      QRS:        73  QRSD:       110                          T:          165  QT:         410  QTc:        488    Interpretive Statements  ACCELERATED JUNCTIONAL RHYTHM  NONSPECIFIC INTRAVENTRICULAR CONDUCTION DELAY  BORDERLINE LOW VOLTAGE IN FRONTAL LEADS  PROBABLE LVH WITH SECONDARY REPOL ABNRM  Compared to ECG 2017 17:03:14  Accelerated junctional rhythm now present  Intraventricular conduction delay now present  Junctional rhythm no longer pres ent  Myocardial infarct finding no longer present  Possible ischemia no longer present  Electronically Signed On 9- 16:29:53 PDT by Saida Kumari       All labs reviewed by me.    RADIOLOGY  CT-HEAD W/O   Final Result         1. No acute intracranial abnormality. No evidence of acute intracranial hemorrhage or mass lesion.               DX-CHEST-PORTABLE (1  VIEW)   Final Result      Cardiomegaly with mild pulmonary vascular congestion and interstitial edema.      Atherosclerotic plaque.      Stable elevation of the right hemidiaphragm.           The radiologist's interpretation of all radiological studies have been reviewed by me.    COURSE & MEDICAL DECISION MAKING  Nursing notes, VS, PMSFHx reviewed in chart.     Patient presents the emergency department with an episode of hypoglycemia, hypotension and bradycardia.  Patient's temperature is hypothermic.  She did respond to fluids and her blood pressure is currently 96/52.  She did receive atropine and transported her heart rate now is in the 60s.  She did require another dose of glucose in the ER due to her altered mental status and she did respond well to this and is now more awake and alert.  She denies any recent illness or fevers.  She denies any chest pain.  She denies any ripping or tearing pain in her chest or abdomen.  She denies any vomiting or diarrhea.  She denies any new change in her medications.  Patient denies being on long-acting insulin or diabetic medications.  Patient does have an extensive cardiac history.    2:36 PM Patient seen and examined at bedside.Ordered for CT-head without, Dx-chest, CBC with differential, CMP, Prothrombin time, Troponin, Urinalysis, Accu-check glucose, and EKG to evaluate. Patient was treated with D50W for her symptoms.    HYDRATION: Based on the patient's presentation of Hypotension,the patient was given IV fluids. IV Hydration was used because oral hydration was not adequate alone. Upon recheck following hydration, the patient was improved.    Still awaiting troponin CMP.  Patient is normal white count without evidence of bandemia.  There is no evidence of anemia.  Patient's INR is minimally elevated.  She is on anticoagulation.  Chest x-ray does show evidence of cardiomegaly with some mild pulmonary vascular congestion.  However she is not had any episodes of hypoxia  here.    Patient continues to be awake and alert.  Blood sugar is 111.  Patient does have a troponin of 53.  She denies any chest pain.  EKG does not show evidence of an acute MI.  I do feel patient needs to be monitored overnight due to her episode of bradycardia that could be the etiology of her syncope versus it being related to her hypoglycemia.  Patient and son understand the plan.    Discussed with the hospitalist who will see the patient.    FINAL IMPRESSION  1. Syncope, unspecified syncope type    2. Bradycardia    3. Hypotension, unspecified hypotension type    4. Hypoglycemia          Nicolasa HALL (Scribe), am scribing for, and in the presence of, Saida Garcia M.D..    Electronically signed by: Nicolasa Driscoll ([email or web address]), 9/10/2020    Saida HALL M.D. personally performed the services described in this documentation, as scribed by Nicolasa Driscoll in my presence, and it is both accurate and complete. C.    The note accurately reflects work and decisions made by me.  Saida Garcia M.D.  9/10/2020  5:50 PM

## 2020-09-10 NOTE — ED NOTES
"BIB [facility] to rm 12, pt is coming from home, was found down by her bed and was altered, pt said she rolled over and slid out of bed. Initial blood sugar by EMS read \"low\" was treated with D10, came up to 120. Upon arrival to the ER, pt was lethargic and slow to respond, FS was 57, notified ERP, verbal order to give 1amp D50. Pt became much more responsive and is AOx3.   Pt was also initially bradycardic 40, and hypotensive 77/40, was given 0.5mg atropine and 100cc NS. /76   Pulse 81   Temp (!) 35.6 °C (96 °F) (Temporal)   Resp 17   Ht 1.499 m (4' 11\")   Wt 54.4 kg (120 lb)   SpO2 92%   BMI 24.24 kg/m²     "

## 2020-09-11 ENCOUNTER — PATIENT OUTREACH (OUTPATIENT)
Dept: HEALTH INFORMATION MANAGEMENT | Facility: OTHER | Age: 85
End: 2020-09-11

## 2020-09-11 ENCOUNTER — APPOINTMENT (OUTPATIENT)
Dept: RADIOLOGY | Facility: MEDICAL CENTER | Age: 85
DRG: 637 | End: 2020-09-11
Attending: INTERNAL MEDICINE
Payer: MEDICARE

## 2020-09-11 ENCOUNTER — APPOINTMENT (OUTPATIENT)
Dept: CARDIOLOGY | Facility: MEDICAL CENTER | Age: 85
DRG: 637 | End: 2020-09-11
Attending: STUDENT IN AN ORGANIZED HEALTH CARE EDUCATION/TRAINING PROGRAM
Payer: MEDICARE

## 2020-09-11 LAB
ALBUMIN SERPL BCP-MCNC: 3 G/DL (ref 3.2–4.9)
ALBUMIN/GLOB SERPL: 1.1 G/DL
ALP SERPL-CCNC: 78 U/L (ref 30–99)
ALT SERPL-CCNC: 24 U/L (ref 2–50)
ANION GAP SERPL CALC-SCNC: 13 MMOL/L (ref 7–16)
AST SERPL-CCNC: 28 U/L (ref 12–45)
BASOPHILS # BLD AUTO: 0.4 % (ref 0–1.8)
BASOPHILS # BLD: 0.03 K/UL (ref 0–0.12)
BILIRUB SERPL-MCNC: 0.7 MG/DL (ref 0.1–1.5)
BUN SERPL-MCNC: 54 MG/DL (ref 8–22)
CALCIUM SERPL-MCNC: 9.2 MG/DL (ref 8.5–10.5)
CHLORIDE SERPL-SCNC: 100 MMOL/L (ref 96–112)
CO2 SERPL-SCNC: 26 MMOL/L (ref 20–33)
CREAT SERPL-MCNC: 1.52 MG/DL (ref 0.5–1.4)
EKG IMPRESSION: NORMAL
EOSINOPHIL # BLD AUTO: 0.07 K/UL (ref 0–0.51)
EOSINOPHIL NFR BLD: 0.8 % (ref 0–6.9)
ERYTHROCYTE [DISTWIDTH] IN BLOOD BY AUTOMATED COUNT: 48.5 FL (ref 35.9–50)
EST. AVERAGE GLUCOSE BLD GHB EST-MCNC: 137 MG/DL
FOLATE SERPL-MCNC: 35.9 NG/ML
GLOBULIN SER CALC-MCNC: 2.8 G/DL (ref 1.9–3.5)
GLUCOSE BLD-MCNC: 103 MG/DL (ref 65–99)
GLUCOSE BLD-MCNC: 128 MG/DL (ref 65–99)
GLUCOSE BLD-MCNC: 199 MG/DL (ref 65–99)
GLUCOSE BLD-MCNC: 201 MG/DL (ref 65–99)
GLUCOSE SERPL-MCNC: 148 MG/DL (ref 65–99)
HBA1C MFR BLD: 6.4 % (ref 0–5.6)
HCT VFR BLD AUTO: 34.2 % (ref 37–47)
HGB BLD-MCNC: 11.5 G/DL (ref 12–16)
IMM GRANULOCYTES # BLD AUTO: 0.04 K/UL (ref 0–0.11)
IMM GRANULOCYTES NFR BLD AUTO: 0.5 % (ref 0–0.9)
LV EJECT FRACT  99904: 40
LV EJECT FRACT MOD 2C 99903: 51.49
LV EJECT FRACT MOD 4C 99902: 47.49
LV EJECT FRACT MOD BP 99901: 48.73
LYMPHOCYTES # BLD AUTO: 0.72 K/UL (ref 1–4.8)
LYMPHOCYTES NFR BLD: 8.6 % (ref 22–41)
MCH RBC QN AUTO: 32.1 PG (ref 27–33)
MCHC RBC AUTO-ENTMCNC: 33.6 G/DL (ref 33.6–35)
MCV RBC AUTO: 95.5 FL (ref 81.4–97.8)
MONOCYTES # BLD AUTO: 0.77 K/UL (ref 0–0.85)
MONOCYTES NFR BLD AUTO: 9.2 % (ref 0–13.4)
NEUTROPHILS # BLD AUTO: 6.75 K/UL (ref 2–7.15)
NEUTROPHILS NFR BLD: 80.5 % (ref 44–72)
NRBC # BLD AUTO: 0 K/UL
NRBC BLD-RTO: 0 /100 WBC
NT-PROBNP SERPL IA-MCNC: ABNORMAL PG/ML (ref 0–125)
PLATELET # BLD AUTO: 271 K/UL (ref 164–446)
PMV BLD AUTO: 9.9 FL (ref 9–12.9)
POTASSIUM SERPL-SCNC: 3.3 MMOL/L (ref 3.6–5.5)
PROT SERPL-MCNC: 5.8 G/DL (ref 6–8.2)
RBC # BLD AUTO: 3.58 M/UL (ref 4.2–5.4)
SODIUM SERPL-SCNC: 139 MMOL/L (ref 135–145)
TSH SERPL DL<=0.005 MIU/L-ACNC: 1.19 UIU/ML (ref 0.38–5.33)
VIT B12 SERPL-MCNC: 1381 PG/ML (ref 211–911)
WBC # BLD AUTO: 8.4 K/UL (ref 4.8–10.8)

## 2020-09-11 PROCEDURE — A9270 NON-COVERED ITEM OR SERVICE: HCPCS | Performed by: STUDENT IN AN ORGANIZED HEALTH CARE EDUCATION/TRAINING PROGRAM

## 2020-09-11 PROCEDURE — 83880 ASSAY OF NATRIURETIC PEPTIDE: CPT

## 2020-09-11 PROCEDURE — 700102 HCHG RX REV CODE 250 W/ 637 OVERRIDE(OP): Performed by: STUDENT IN AN ORGANIZED HEALTH CARE EDUCATION/TRAINING PROGRAM

## 2020-09-11 PROCEDURE — 82746 ASSAY OF FOLIC ACID SERUM: CPT

## 2020-09-11 PROCEDURE — 84560 ASSAY OF URINE/URIC ACID: CPT

## 2020-09-11 PROCEDURE — 36415 COLL VENOUS BLD VENIPUNCTURE: CPT

## 2020-09-11 PROCEDURE — 93010 ELECTROCARDIOGRAM REPORT: CPT | Performed by: INTERNAL MEDICINE

## 2020-09-11 PROCEDURE — 82607 VITAMIN B-12: CPT

## 2020-09-11 PROCEDURE — 97165 OT EVAL LOW COMPLEX 30 MIN: CPT

## 2020-09-11 PROCEDURE — 97161 PT EVAL LOW COMPLEX 20 MIN: CPT

## 2020-09-11 PROCEDURE — 82962 GLUCOSE BLOOD TEST: CPT

## 2020-09-11 PROCEDURE — 93306 TTE W/DOPPLER COMPLETE: CPT | Mod: 26 | Performed by: INTERNAL MEDICINE

## 2020-09-11 PROCEDURE — 85025 COMPLETE CBC W/AUTO DIFF WBC: CPT

## 2020-09-11 PROCEDURE — 700105 HCHG RX REV CODE 258: Performed by: STUDENT IN AN ORGANIZED HEALTH CARE EDUCATION/TRAINING PROGRAM

## 2020-09-11 PROCEDURE — 93005 ELECTROCARDIOGRAM TRACING: CPT | Performed by: INTERNAL MEDICINE

## 2020-09-11 PROCEDURE — 80053 COMPREHEN METABOLIC PANEL: CPT

## 2020-09-11 PROCEDURE — 84443 ASSAY THYROID STIM HORMONE: CPT

## 2020-09-11 PROCEDURE — 93306 TTE W/DOPPLER COMPLETE: CPT

## 2020-09-11 PROCEDURE — 770020 HCHG ROOM/CARE - TELE (206)

## 2020-09-11 PROCEDURE — 83036 HEMOGLOBIN GLYCOSYLATED A1C: CPT

## 2020-09-11 PROCEDURE — 99222 1ST HOSP IP/OBS MODERATE 55: CPT | Performed by: INTERNAL MEDICINE

## 2020-09-11 RX ORDER — DIPHENHYDRAMINE HCL 25 MG
50 TABLET ORAL NIGHTLY PRN
Status: DISCONTINUED | OUTPATIENT
Start: 2020-09-11 | End: 2020-09-14 | Stop reason: HOSPADM

## 2020-09-11 RX ORDER — POTASSIUM CHLORIDE 20 MEQ/1
40 TABLET, EXTENDED RELEASE ORAL DAILY
Status: DISCONTINUED | OUTPATIENT
Start: 2020-09-11 | End: 2020-09-12

## 2020-09-11 RX ORDER — CARVEDILOL 6.25 MG/1
6.25 TABLET ORAL 2 TIMES DAILY WITH MEALS
Status: DISCONTINUED | OUTPATIENT
Start: 2020-09-11 | End: 2020-09-14 | Stop reason: HOSPADM

## 2020-09-11 RX ORDER — SODIUM CHLORIDE 9 MG/ML
1000 INJECTION, SOLUTION INTRAVENOUS ONCE
Status: COMPLETED | OUTPATIENT
Start: 2020-09-11 | End: 2020-09-11

## 2020-09-11 RX ADMIN — INSULIN HUMAN 2 UNITS: 100 INJECTION, SOLUTION PARENTERAL at 12:15

## 2020-09-11 RX ADMIN — APIXABAN 2.5 MG: 2.5 TABLET, FILM COATED ORAL at 05:05

## 2020-09-11 RX ADMIN — ATORVASTATIN CALCIUM 80 MG: 80 TABLET, FILM COATED ORAL at 17:10

## 2020-09-11 RX ADMIN — DIPHENHYDRAMINE HYDROCHLORIDE 50 MG: 25 TABLET ORAL at 21:10

## 2020-09-11 RX ADMIN — APIXABAN 2.5 MG: 2.5 TABLET, FILM COATED ORAL at 17:10

## 2020-09-11 RX ADMIN — SODIUM CHLORIDE 1000 ML: 9 INJECTION, SOLUTION INTRAVENOUS at 12:00

## 2020-09-11 RX ADMIN — POTASSIUM CHLORIDE 40 MEQ: 1500 TABLET, EXTENDED RELEASE ORAL at 09:39

## 2020-09-11 RX ADMIN — CARVEDILOL 6.25 MG: 6.25 TABLET, FILM COATED ORAL at 17:11

## 2020-09-11 RX ADMIN — LISINOPRIL 10 MG: 10 TABLET ORAL at 05:05

## 2020-09-11 ASSESSMENT — LIFESTYLE VARIABLES
ALCOHOL_USE: NO
TOTAL SCORE: 0
HAVE YOU EVER FELT YOU SHOULD CUT DOWN ON YOUR DRINKING: NO
EVER HAD A DRINK FIRST THING IN THE MORNING TO STEADY YOUR NERVES TO GET RID OF A HANGOVER: NO
HAVE PEOPLE ANNOYED YOU BY CRITICIZING YOUR DRINKING: NO
CONSUMPTION TOTAL: NEGATIVE
EVER FELT BAD OR GUILTY ABOUT YOUR DRINKING: NO
AVERAGE NUMBER OF DAYS PER WEEK YOU HAVE A DRINK CONTAINING ALCOHOL: 0
HOW MANY TIMES IN THE PAST YEAR HAVE YOU HAD 5 OR MORE DRINKS IN A DAY: 0
ON A TYPICAL DAY WHEN YOU DRINK ALCOHOL HOW MANY DRINKS DO YOU HAVE: 0
DOES PATIENT WANT TO STOP DRINKING: NO
TOTAL SCORE: 0
TOTAL SCORE: 0

## 2020-09-11 ASSESSMENT — ENCOUNTER SYMPTOMS
VOMITING: 0
DIARRHEA: 0
DEPRESSION: 0
CHILLS: 0
DIZZINESS: 0
BLURRED VISION: 0
HEADACHES: 0
WHEEZING: 0
CONSTIPATION: 0
COUGH: 0
MYALGIAS: 0
BACK PAIN: 0
WEAKNESS: 0
NAUSEA: 0
PHOTOPHOBIA: 0
HEARTBURN: 0
FOCAL WEAKNESS: 0
FEVER: 0
ABDOMINAL PAIN: 0
SHORTNESS OF BREATH: 0
DOUBLE VISION: 0
PALPITATIONS: 0

## 2020-09-11 ASSESSMENT — PATIENT HEALTH QUESTIONNAIRE - PHQ9
2. FEELING DOWN, DEPRESSED, IRRITABLE, OR HOPELESS: NOT AT ALL
SUM OF ALL RESPONSES TO PHQ9 QUESTIONS 1 AND 2: 0
1. LITTLE INTEREST OR PLEASURE IN DOING THINGS: NOT AT ALL

## 2020-09-11 ASSESSMENT — CHA2DS2 SCORE
CHF OR LEFT VENTRICULAR DYSFUNCTION: YES
DIABETES: YES
SEX: FEMALE
CHA2DS2 VASC SCORE: 6
AGE 65 TO 74: NO
VASCULAR DISEASE: NO
HYPERTENSION: YES
PRIOR STROKE OR TIA OR THROMBOEMBOLISM: NO
AGE 75 OR GREATER: YES

## 2020-09-11 ASSESSMENT — COGNITIVE AND FUNCTIONAL STATUS - GENERAL
HELP NEEDED FOR BATHING: A LITTLE
MOBILITY SCORE: 23
TOILETING: A LOT
DRESSING REGULAR UPPER BODY CLOTHING: A LITTLE
SUGGESTED CMS G CODE MODIFIER DAILY ACTIVITY: CK
DAILY ACTIVITIY SCORE: 18
SUGGESTED CMS G CODE MODIFIER MOBILITY: CI
DRESSING REGULAR LOWER BODY CLOTHING: A LOT
CLIMB 3 TO 5 STEPS WITH RAILING: A LITTLE

## 2020-09-11 ASSESSMENT — GAIT ASSESSMENTS
DISTANCE (FEET): 135
ASSISTIVE DEVICE: FRONT WHEEL WALKER
GAIT LEVEL OF ASSIST: SUPERVISED

## 2020-09-11 ASSESSMENT — ACTIVITIES OF DAILY LIVING (ADL): TOILETING: INDEPENDENT

## 2020-09-11 NOTE — NON-PROVIDER
Date of Admission: 9/10/2020  [facility] Team: [facility] Team  Attending: Dr. Wiggins  Senior Resident: Dr. Welch  Intern: Dr. Santiago    Chief Complaint:   Chief Complaint   Patient presents with   • ALOC       Subjective:  Ana Torres is a 85 y.o. female with PMH of CHF, CAD, T2DM, HTN, HLD who was admitted 9/10/2020 after a GLF upon trying to get out of bed with LOC afterwards. Per patient, she felt fine coming out and slipped on her carpet but afterwards woke up in the ER and her son told her the story. She was laying there for ~30 minutes when the son called the neighbor who found the pt laying unconscious and called EMS. Overnight, patient had no significant events except she had an episode of vomiting after half a sandwich for dinner. Currently, the patient has no complaints including nausea and states she only feels hungry for breakfast. Denies dizziness, chest pain, abdominal pain, weakness, dysuria, frequency.    Review of Systems:   Positive for vomiting. Negative for nausea, dizziness, chest pain, abdominal pain, dysuria, frequency.    Objective Data:   Vitals:   Temp:  [35.6 °C (96 °F)-36.8 °C (98.2 °F)] 36.8 °C (98.2 °F)  Pulse:  [53-81] 55  Resp:  [16-21] 18  BP: ()/(46-77) 106/59  SpO2:  [90 %-97 %] 94 %    Physical Exam:  Constitutional: Well-developed, well-nourished, and in no apparent distress.   HEENT: Normocephalic and atraumatic, PERRLA, EOMI. Poor dentition.  Neck: Normal range of motion.   Cardiovascular: Normal rate, regular rhythm and normal heart sounds.   Pulmonary/Chest: Breath sounds normal. No crackles noted.  Abdomen: Soft, non-tender. Normoactive bowel sounds  Back: No CVA tenderness  Musculoskeletal: Normal range of motion.  Extremities: Trace edema to bilateral lower extremities.  Neurological: A&Ox4, CN II-XII grossly intact, no focal deficits noted  Psychiatric: Affect normal.     Diagnostic Studies and Procedures:    Labs:   Recent Results (from the past 24 hour(s))    CBC WITH DIFFERENTIAL    Collection Time: 09/10/20  2:47 PM   Result Value Ref Range    WBC 8.8 4.8 - 10.8 K/uL    RBC 4.13 (L) 4.20 - 5.40 M/uL    Hemoglobin 13.3 12.0 - 16.0 g/dL    Hematocrit 40.3 37.0 - 47.0 %    MCV 97.6 81.4 - 97.8 fL    MCH 32.2 27.0 - 33.0 pg    MCHC 33.0 (L) 33.6 - 35.0 g/dL    RDW 49.2 35.9 - 50.0 fL    Platelet Count 142 (L) 164 - 446 K/uL    MPV 11.6 9.0 - 12.9 fL    Neutrophils-Polys 88.30 (H) 44.00 - 72.00 %    Lymphocytes 6.00 (L) 22.00 - 41.00 %    Monocytes 4.90 0.00 - 13.40 %    Eosinophils 0.10 0.00 - 6.90 %    Basophils 0.20 0.00 - 1.80 %    Immature Granulocytes 0.50 0.00 - 0.90 %    Nucleated RBC 0.00 /100 WBC    Neutrophils (Absolute) 7.80 (H) 2.00 - 7.15 K/uL    Lymphs (Absolute) 0.53 (L) 1.00 - 4.80 K/uL    Monos (Absolute) 0.43 0.00 - 0.85 K/uL    Eos (Absolute) 0.01 0.00 - 0.51 K/uL    Baso (Absolute) 0.02 0.00 - 0.12 K/uL    Immature Granulocytes (abs) 0.04 0.00 - 0.11 K/uL    NRBC (Absolute) 0.00 K/uL   PROTHROMBIN TIME    Collection Time: 09/10/20  3:29 PM   Result Value Ref Range    PT 16.6 (H) 12.0 - 14.6 sec    INR 1.30 (H) 0.87 - 1.13   Comp Metabolic Panel    Collection Time: 09/10/20  5:01 PM   Result Value Ref Range    Sodium 140 135 - 145 mmol/L    Potassium 3.9 3.6 - 5.5 mmol/L    Chloride 99 96 - 112 mmol/L    Co2 25 20 - 33 mmol/L    Anion Gap 16.0 7.0 - 16.0    Glucose 111 (H) 65 - 99 mg/dL    Bun 50 (H) 8 - 22 mg/dL    Creatinine 1.10 0.50 - 1.40 mg/dL    Calcium 9.6 8.5 - 10.5 mg/dL    AST(SGOT) 31 12 - 45 U/L    ALT(SGPT) 31 2 - 50 U/L    Alkaline Phosphatase 89 30 - 99 U/L    Total Bilirubin 1.1 0.1 - 1.5 mg/dL    Albumin 3.5 3.2 - 4.9 g/dL    Total Protein 6.7 6.0 - 8.2 g/dL    Globulin 3.2 1.9 - 3.5 g/dL    A-G Ratio 1.1 g/dL   TROPONIN    Collection Time: 09/10/20  5:01 PM   Result Value Ref Range    Troponin T 53 (H) 6 - 19 ng/L   ESTIMATED GFR    Collection Time: 09/10/20  5:01 PM   Result Value Ref Range    GFR If  57 (A) >60  mL/min/1.73 m 2    GFR If Non  47 (A) >60 mL/min/1.73 m 2   URINALYSIS    Collection Time: 09/10/20  6:05 PM    Specimen: Urine, Clean Catch   Result Value Ref Range    Color Yellow     Character Cloudy (A)     Specific Gravity 1.009 <1.035    Ph 5.0 5.0 - 8.0    Glucose Negative Negative mg/dL    Ketones Negative Negative mg/dL    Protein 30 (A) Negative mg/dL    Bilirubin Negative Negative    Urobilinogen, Urine 0.2 Negative    Nitrite Negative Negative    Leukocyte Esterase Small (A) Negative    Occult Blood Small (A) Negative    Micro Urine Req Microscopic    URINE MICROSCOPIC (W/UA)    Collection Time: 09/10/20  6:05 PM   Result Value Ref Range    -150 (A) /hpf    RBC 2-5 (A) /hpf    Bacteria Moderate (A) None /hpf    Epithelial Cells Negative /hpf    Hyaline Cast 0-2 /lpf   ACCU-CHEK GLUCOSE    Collection Time: 09/10/20  9:37 PM   Result Value Ref Range    Glucose - Accu-Ck 61 (L) 65 - 99 mg/dL   ACCU-CHEK GLUCOSE    Collection Time: 09/11/20  1:16 AM   Result Value Ref Range    Glucose - Accu-Ck 199 (H) 65 - 99 mg/dL   CBC with Differential    Collection Time: 09/11/20  5:44 AM   Result Value Ref Range    WBC 8.4 4.8 - 10.8 K/uL    RBC 3.58 (L) 4.20 - 5.40 M/uL    Hemoglobin 11.5 (L) 12.0 - 16.0 g/dL    Hematocrit 34.2 (L) 37.0 - 47.0 %    MCV 95.5 81.4 - 97.8 fL    MCH 32.1 27.0 - 33.0 pg    MCHC 33.6 33.6 - 35.0 g/dL    RDW 48.5 35.9 - 50.0 fL    Platelet Count 271 164 - 446 K/uL    MPV 9.9 9.0 - 12.9 fL    Neutrophils-Polys 80.50 (H) 44.00 - 72.00 %    Lymphocytes 8.60 (L) 22.00 - 41.00 %    Monocytes 9.20 0.00 - 13.40 %    Eosinophils 0.80 0.00 - 6.90 %    Basophils 0.40 0.00 - 1.80 %    Immature Granulocytes 0.50 0.00 - 0.90 %    Nucleated RBC 0.00 /100 WBC    Neutrophils (Absolute) 6.75 2.00 - 7.15 K/uL    Lymphs (Absolute) 0.72 (L) 1.00 - 4.80 K/uL    Monos (Absolute) 0.77 0.00 - 0.85 K/uL    Eos (Absolute) 0.07 0.00 - 0.51 K/uL    Baso (Absolute) 0.03 0.00 - 0.12 K/uL     Immature Granulocytes (abs) 0.04 0.00 - 0.11 K/uL    NRBC (Absolute) 0.00 K/uL   Comp Metabolic Panel (CMP)    Collection Time: 09/11/20  5:44 AM   Result Value Ref Range    Sodium 139 135 - 145 mmol/L    Potassium 3.3 (L) 3.6 - 5.5 mmol/L    Chloride 100 96 - 112 mmol/L    Co2 26 20 - 33 mmol/L    Anion Gap 13.0 7.0 - 16.0    Glucose 148 (H) 65 - 99 mg/dL    Bun 54 (H) 8 - 22 mg/dL    Creatinine 1.52 (H) 0.50 - 1.40 mg/dL    Calcium 9.2 8.5 - 10.5 mg/dL    AST(SGOT) 28 12 - 45 U/L    ALT(SGPT) 24 2 - 50 U/L    Alkaline Phosphatase 78 30 - 99 U/L    Total Bilirubin 0.7 0.1 - 1.5 mg/dL    Albumin 3.0 (L) 3.2 - 4.9 g/dL    Total Protein 5.8 (L) 6.0 - 8.2 g/dL    Globulin 2.8 1.9 - 3.5 g/dL    A-G Ratio 1.1 g/dL   proBrain Natriuretic Peptide, NT    Collection Time: 09/11/20  5:44 AM   Result Value Ref Range    NT-proBNP 01329 (H) 0 - 125 pg/mL   TSH WITH REFLEX TO FT4    Collection Time: 09/11/20  5:44 AM   Result Value Ref Range    TSH 1.190 0.380 - 5.330 uIU/mL   ESTIMATED GFR    Collection Time: 09/11/20  5:44 AM   Result Value Ref Range    GFR If  39 (A) >60 mL/min/1.73 m 2    GFR If Non  33 (A) >60 mL/min/1.73 m 2   FOLATE    Collection Time: 09/11/20  5:44 AM   Result Value Ref Range    Folate -Folic Acid 35.9 >4.0 ng/mL   VITAMIN B12    Collection Time: 09/11/20  5:44 AM   Result Value Ref Range    Vitamin B12 -True Cobalamin 1381 (H) 211 - 911 pg/mL   HEMOGLOBIN A1C    Collection Time: 09/11/20  5:44 AM   Result Value Ref Range    Glycohemoglobin 6.4 (H) 0.0 - 5.6 %    Est Avg Glucose 137 mg/dL   ACCU-CHEK GLUCOSE    Collection Time: 09/11/20 12:03 PM   Result Value Ref Range    Glucose - Accu-Ck 201 (H) 65 - 99 mg/dL   EKG    Collection Time: 09/11/20 12:10 PM   Result Value Ref Range    Report       [facility]    Test Date:  2020-09-11  Pt Name:    MORRO PINZON            Department: 171  MRN:        [id number]                      Room:       T716  Gender:     Female                        Technician: ANN  :        1935                   Requested By:ISABELLE RODRIGUES  Order #:    [id number]                    Reading MD: Reuben Shepherd MD    Measurements  Intervals                                Axis  Rate:       59                           P:          0  FL:         294                          QRS:        34  QRSD:       112                          T:          116  QT:         468  QTc:        464    Interpretive Statements  SINUS BRADYCARDIA  ATRIAL PREMATURE COMPLEX  FIRST DEGREE AV BLOCK  PROBABLE LEFT ATRIAL ABNORMALITY  NONSPECIFIC INTRAVENTRICULAR CONDUCTION DELAY  LVH WITH SECONDARY REPOLARIZATION ABNORMALITY  Probable ANTERIOR INFARCT, OLD  Electronically Signed On 2020 12:15:41 PDT by Reuben Shepherd MD       Imaging:   CT-HEAD W/O   Final Result         1. No acute intracranial abnormality. No evidence of acute intracranial hemorrhage or mass lesion.               DX-CHEST-PORTABLE (1 VIEW)   Final Result      Cardiomegaly with mild pulmonary vascular congestion and interstitial edema.      Atherosclerotic plaque.      Stable elevation of the right hemidiaphragm.         EC-ECHOCARDIOGRAM COMPLETE W/O CONT    (Results Pending)     Assessment & Plan:  Ana Torres is a 85 y.o. female with PMH of CHF, CAD, T2DM, HTN, HLD who was admitted 9/10/2020 after a GLF upon trying to get out of bed with LOC afterwards.    # Ground Level Fall  - Etiology could be due to CHF, aFib, Hypoglycemia, Orthostatic hypotension, dehydration. Most likely due to hypoglycemia due to BC in the 40s with resolving symptoms after treatment with D50  - Orthostatics pending for evaluation  - PT/OT pending    # Acute Kidney Injury  - Creatinine increased from yesterday, likely JOSE C  - Likely dehydration can will be given 1 L bolus  - Ordered FEuric acid for further evaluation    # CHF  - Home dosage of 40mg Lasix, 25mg Spironolactone, 25mg Carvedilol, 20mg  Lisinopril  - All held except started on 6.25mg Carvedilol, 10mg Lisinopril  - Last echo 2019 with EF of 45%. Repeat echo pending.  - Concern for bradycardia and hypotension, will not resume home dosage    #Hypoglycemia  - SSi currently  - 10 units Lantus in AM for home dosage  - A1c of 6.3 indicates patient is likely overtreated with history of T2DM   - States she has been on Lantus since diagnosis and has never tried oral medications    # Asymptomatic Bacteruria  - Urinalysis shows small leukocyte esterase, 100-150 WBC, moderate bacteria  - Denies dysuria without CVA tenderness, flank pain  - Could cause delirium and confusion but unlikely to be resolved after a dose of D50    # Atrial Fibrillation  - Resume home 2.5mg Eliquis

## 2020-09-11 NOTE — THERAPY
"Physical Therapy   Initial Evaluation     Patient Name: Ana Torres  Age:  85 y.o., Sex:  female  Medical Record #: [id number]  Today's Date: 9/11/2020          Assessment  Patient is 85 y.o. female admitted after sustaining a fall from her bed at home. Today, pt presents very near or at her functional baseline. She is able to manage herself during bed mobility and initiated sit to stand with FWW. Pt completed x135' of gait with FWW and no overt LOB. She demonstrates compensatory strategy for clearing R foot during swing; pt reports she thinks she had a stroke some time ago resulting in R LE weakness/foot drop. At this time, pt does not require further acute PT intervention and appears functionally capable of return home with HHPT.     Plan    Recommend Physical Therapy for Evaluation only     DC Equipment Recommendations: None  Discharge Recommendations: Recommend home health for continued physical therapy services          Objective       09/11/20 1521   Prior Living Situation   Prior Services Meals on Wheels;Intermittent Physical Support for ADL Per Family   Housing / Facility 1 Story House   Steps Into Home 1   Equipment Owned Single Point Cane;Front-Wheel Walker;Wheelchair   Lives with - Patient's Self Care Capacity Alone and Unable to Care For Self   Comments pt reports her son stops by to see her a few days a week; otherwise, has Meals on Wheels    Prior Level of Functional Mobility   Bed Mobility Independent   Transfer Status Independent   Ambulation Independent   Distance Ambulation (Feet)   (community)   Assistive Devices Used Wheelchair   Comments pt reports she pushes her wc around her house. \"I have a FWW somewhere I think\"   Gait Analysis   Gait Level Of Assist Supervised   Assistive Device Front Wheel Walker   Distance (Feet) 135   # of Times Distance was Traveled 1   Weight Bearing Status FWB   Bed Mobility    Supine to Sit Supervised   Sit to Supine Supervised   Scooting Supervised   Rolling " Supervised   Functional Mobility   Sit to Stand Supervised   Bed, Chair, Wheelchair Transfer Supervised   Transfer Method Stand Step   Anticipated Discharge Equipment and Recommendations   DC Equipment Recommendations None   Discharge Recommendations Recommend home health for continued physical therapy services

## 2020-09-11 NOTE — ASSESSMENT & PLAN NOTE
Fell morning of 9/10 and may have had LOC from time of being on floor before being seen by EMS. Likely 2/2 hypoglycemia vs dehydration. Echo demonstrates LVEF 40%, RV systolic pressure 100 mmHg, and basal hyperkinesis with apical hypokinesis, and severe aortic stenosis.  -Lisinopril 10mg PO from 20mg  -carvedilol 6.25mg BID from 25 BID  -holding lasix, spironolactone

## 2020-09-11 NOTE — PROGRESS NOTES
Handoff report received from ED RN. Patient is currently AAOx4, on RA, on Tele monitoring, and VSS. Patient is resting in bed with no complaints of pain. Patient updated on POC. Patient is having some generalized weakness. Patient educated on use of call light, bed in lowest and locked position. Will continue to monitor.

## 2020-09-11 NOTE — ASSESSMENT & PLAN NOTE
History of atrial fibrillation, could be a component of her heart failure. At ED evaluation, was regularly irregular with rate of 70-80. On eliquis for Afib and CHF.  -dose reduce apixaban to 2.5 mg BID  -dose reduce carvedilol 6.25mg BID

## 2020-09-11 NOTE — ASSESSMENT & PLAN NOTE
History of CHF her last ECHO was in spring of 2019, which showed EF of 45%. She has trace pitting edema of the LE and very subtle bibasilar crackles. She takes lasix and spironolactone at home and reports doing so regularly. Troponin elevated at 53 and BNP at [address]. Echo demonstrates LVEF 40%, RV systolic pressure 100 mmHg, and basal hyperkinesis with apical hypokinesis, and severe aortic stenosis.  -lisinopril 10mg PO qD  -carvedilol 6.25mg BID  -restart furosemide 20 mg qd prn  -restart spironolactone 25 mg qd prn  -follow up cardiology as outpatient  -follow up PCP as outpatient

## 2020-09-11 NOTE — ASSESSMENT & PLAN NOTE
History of CAD with CABG done in 2004.  -continue Atorvastatin, carvedilol, lisinopril  -restart furosemide 20 mg qd prn  -restart spironolactone 25 mg qd prn  -follow up with PCP outpatient  -follow up with cardiology outpatient

## 2020-09-11 NOTE — DISCHARGE PLANNING
Hospital Care Management Team Assessment     In the case of an emergency, pt's NOK is Jony Otero (son) at [phone].     This RNCM spoke with patient at bedside, verified the accuracy of the facesheet, and obtained the information used in this assessment.      Pt lives alone in a single story house in [place], [place]. Prior to current hospitalization, pt was assisted with ADLS/IADLS by her son who shops for her and helps out around the house 2-times a week. Pt uses a walker for assistance with walking. Pt's son drives her to and from her appointments. Pt is currently retired. Pt has prescription coverage, and uses the [facility] on [facility]. Pt's support system includes her son and her neighbor. Pt's discharge plan is to return home once medically cleared.           Information Source  Orientation : Oriented x 4  Information Given By: Patient  Informant's Name: Robyn Torres  Who is responsible for making decisions for patient? : Patient    Readmission Evaluation  Is this a readmission?: No    Elopement Risk  Legal Hold: No  Ambulatory or Self Mobile in Wheelchair: No-Not an Elopement Risk  Disoriented: No  Psychiatric Symptoms: None  History of Wandering: No  Elopement this Admit: No  Vocalizing Wanting to Leave: No  Displays Behaviors, Body Language Wanting to Leave: No-Not at Risk for Elopement  Elopement Risk: Not at Risk for Elopement    Interdisciplinary Discharge Planning  Does Admitting Nurse Feel This Could be a Complex Discharge?: No  Primary Care Physician: Dr. Coffey  Lives with - Patient's Self Care Capacity: Alone and Able to Care For Self  Patient or legal guardian wants to designate a caregiver: No  Support Systems: Children, Family Member(s), Friends / Neighbors  Housing / Facility: 1 Story House  Do You Take your Prescribed Medications Regularly: Yes  Able to Return to Previous ADL's: Future Time w/Therapy  Mobility Issues: Yes  Prior Services: None,  Home-Independent  Patient Prefers to be Discharged to:: Home  Assistance Needed: Yes  Durable Medical Equipment: Walker    Discharge Preparedness  What is your plan after discharge?: Home with help  What are your discharge supports?: Child  Prior Functional Level: Uses Walker, Needs Assist with Activities of Daily Living, Independent with Medication Management  Difficulity with ADLs: Walking  Difficulty with ADLs Comment: Uses walker  Difficulity with IADLs: Driving, Shopping  Difficulity with IADL Comments: Sone drives and buys groceries 2 times a week    Functional Assesment  Prior Functional Level: Uses Walker, Needs Assist with Activities of Daily Living, Independent with Medication Management    Finances  Financial Barriers to Discharge: No  Prescription Coverage: Yes    Vision / Hearing Impairment  Vision Impairment : Yes  Right Eye Vision: Impaired  Left Eye Vision: Impaired  Hearing Impairment : No         Advance Directive  Advance Directive?: DPOA for Health Care    Domestic Abuse  Have you ever been the victim of abuse or violence?: No  Physical Abuse or Sexual Abuse: No  Verbal Abuse or Emotional Abuse: No  Possible Abuse/Neglect Reported to:: Not Applicable    Psychological Assessment  History of Substance Abuse: None  History of Psychiatric Problems: No  Non-compliant with Treatment: No  Newly Diagnosed Illness: No    Discharge Risks or Barriers  Discharge risks or barriers?: Complex medical needs, Post-acute placement / services, Other (comment)(Lives alone)  Patient risk factors: Vulnerable adult, Complex medical needs, Other (comment)(Lives alone)

## 2020-09-11 NOTE — PROGRESS NOTES
9/11. CHW Pedro made an attempt to meet with pt bedside to introduce CCM services, staff present. This CHW will come back at a different time.

## 2020-09-11 NOTE — PROGRESS NOTES
Assumed care of PT. A&O 4. Pt resting in bed with no signs of labored breathing. On RA. Tele monitor in place, cardiac rhythm being monitored. Call light within reach, bed in lowest position, upper bed rails up. Pt was updated on plan of care for the day. Will continue to monitor.

## 2020-09-11 NOTE — PROGRESS NOTES
2 RN skin check complete with Naa RN  Devices in place NA.  Skin assessed under devices NA.  Confirmed pressure ulcers found on NA.  New potential pressure ulcers noted on NA. Wound consult placed NA.  The following interventions in place: extra blankets and pillows in use, drawsheet in place for repositioning, barrier cream offered.    Patient has moisture associated redness under right breasts, blanchable redness on sacrum, and scattered bruising on bilateral upper extremities.

## 2020-09-11 NOTE — ASSESSMENT & PLAN NOTE
Has history of hypertension on home lisinopril, carvedilol, furosemide, and spironolactone  -continue dose reduced lisinopril 10 mg qd  -dose reduce carvedilol 6.25 mg BID  -dose reduce furosemide at 20 mg qd prn as outpatient  -spironolactone 25 mg qd prn as outpatient

## 2020-09-11 NOTE — H&P
"History & Physical Note    Date of Admission: 9/10/2020  Admission Status: Inpatient  [facility] Team: [facility] IM Blue Team  Attending: Natividad Wiggins MD   Senior Resident: Dr. Lora Russell  Intern: Dr. Torin Santiago  Contact Number: [phone]    Chief Complaint: \"I slipped and fell.\"     History of Present Illness (HPI):   Ana is a 85 y.o. female with history of CHF, CAD, DM, HTN, HLD, who presented 9/10/2020 after falling from bed this morning after waking. This morning, she started to get out of bed and had both feet on the floor when she slipped out and fell to floor. She did not hit her head, and recalls events up to when she was laying on floor, however, she also states that she does not recall events from laying on the floor to when the EMS arrived to transport her to hospital. Does not feel she had any preceding symptoms such as lightheadedness, dizziness, or confusion. She took 6u insulin before going to bed, and had eaten dinner the night before.  She also complains of back pain, which she says in relieved by sitting up. She also has mild shortness of breath with laying flat, stating that she normally sleeps in reclined positions or on her side.    Review of Systems:   Review of Systems   Constitutional: Positive for chills. Negative for fever and malaise/fatigue.   HENT: Negative for congestion, hearing loss, nosebleeds, sore throat and tinnitus.    Eyes: Negative for blurred vision and double vision.   Respiratory: Positive for shortness of breath. Negative for cough and sputum production.    Cardiovascular: Negative for chest pain and leg swelling.   Gastrointestinal: Negative for abdominal pain, blood in stool, constipation, diarrhea, nausea and vomiting.   Genitourinary: Negative for dysuria, frequency, hematuria and urgency.   Musculoskeletal: Positive for back pain and falls. Negative for joint pain and myalgias.   Neurological: Negative for dizziness, focal weakness, loss of " consciousness, weakness and headaches.   Psychiatric/Behavioral: Negative for depression. The patient is not nervous/anxious.          Past Medical History:   Past Medical History was reviewed with patient.   has a past medical history of CAD (coronary artery disease), CATARACT, CHF (congestive heart failure) ([facility]), Cystocele, Diabetes, DJD (degenerative joint disease), HTN (hypertension), benign, Hyperlipidemia, Hypertension, Medical home, Myocardial infarct (HCC) (2000), PVD (peripheral vascular disease) ([facility]), Renal disorder, Unspecified urinary incontinence, and Urinary bladder disorder. She also has no past medical history of Breast cancer ([facility]).    Past Surgical History: Past Surgical History was reviewed with patient.   has a past surgical history that includes abdominal hysterectomy total; multiple coronary artery bypass (2004); and cataract phaco with iol (3/12/2014).    Medications: Medications have been reviewed with patient.  Prior to Admission Medications   Prescriptions Last Dose Informant Patient Reported? Taking?   Multiple Vitamins-Minerals (MULTI COMPLETE PO) 9/9/2020 at AM Patient Yes No   Sig: Take  by mouth.   apixaban (ELIQUIS) 5mg Tab 9/9/2020 at PM Patient Yes Yes   Sig: Take 5 mg by mouth 2 Times a Day.   atorvastatin (LIPITOR) 80 MG tablet 9/9/2020 at AM Patient Yes Yes   Sig: Take 80 mg by mouth every morning.   carvedilol (COREG) 25 MG Tab 9/9/2020 at PM Patient Yes Yes   Sig: Take 25 mg by mouth 2 Times a Day.   furosemide (LASIX) 40 MG Tab 9/9/2020 at PM Patient Yes Yes   Sig: Take 40 mg by mouth 2 Times a Day.   insulin glargine (LANTUS) 100 UNIT/ML Solution 9/9/2020 at PM Patient Yes Yes   Sig: Inject 10 Units as instructed every evening.   lisinopril (PRINIVIL) 20 MG Tab 9/9/2020 at AM Patient Yes Yes   Sig: Take 20 mg by mouth every morning.   spironolactone (ALDACTONE) 25 MG Tab 9/9/2020 at AM Patient Yes Yes   Sig: Take 25 mg by mouth every morning.      Facility-Administered  Medications: None        Allergies: Allergies have been reviewed with patient.  Allergies   Allergen Reactions   • Tetracaine Hcl [Tetcaine]    • Vancomycin    • Vigamox [Moxifloxacin]        Family History: reviewed and confirmed. Reports that son has .  family history includes Diabetes in her daughter; Heart Disease in her mother and son; Hypertension in her mother; No Known Problems in her maternal grandfather, maternal grandmother, and son; Seizures in her son.     Social History:   Tobacco: never   Alcohol: rare, 1-2 on holidays, estimated 1/year   Recreational drugs (illegal and prescription):  never   Employment: retired  Activity Level: independent, assistance from neighbours   Living situation:  Lives alone, large home in [place], carpeted floors throughout house/garage  Recent travel:  none  Primary Care Provider: reviewed Rich Coffey M.D.  Other (stressors, spirituality, exposures):    Physical Exam:   Vitals:  Temp:  [35.6 °C (96 °F)] 35.6 °C (96 °F)  Pulse:  [53-81] 54  Resp:  [17-21] 19  BP: ()/(52-76) 163/67  SpO2:  [90 %-97 %] 94 %    Physical Exam  Vitals signs and nursing note reviewed.   Constitutional:       General: She is not in acute distress.     Appearance: She is not ill-appearing.   HENT:      Head: Normocephalic and atraumatic.      Mouth/Throat:      Mouth: Mucous membranes are dry.      Pharynx: Oropharynx is clear. No posterior oropharyngeal erythema.      Comments: Missing most upper teeth, some lowers  Eyes:      Extraocular Movements: Extraocular movements intact.      Pupils: Pupils are equal, round, and reactive to light.   Neck:      Musculoskeletal: Normal range of motion.   Cardiovascular:      Rate and Rhythm: Normal rate.      Heart sounds: No murmur. No friction rub. No gallop.    Pulmonary:      Comments: Somewhat minimally diminished lung sounds bilaterally; subtle bilateral basilar crackles.  Abdominal:      General: Abdomen is flat. There is  no distension.      Tenderness: There is no abdominal tenderness. There is no guarding or rebound.   Musculoskeletal: Normal range of motion.      Comments: Upper extremity 5/5 bilaterally. Hip flexion 4-/5 bilaterally. Dorsi/plantar flexion 5/5 bilaterally.   Lymphadenopathy:      Cervical: No cervical adenopathy.   Skin:     General: Skin is warm and dry.   Neurological:      Mental Status: She is alert and oriented to person, place, and time.      Cranial Nerves: No cranial nerve deficit.   Psychiatric:         Mood and Affect: Mood normal.         Behavior: Behavior normal.         Thought Content: Thought content normal.         Judgment: Judgment normal.         Labs:   Lab Results   Component Value Date/Time    SODIUM 140 09/10/2020 05:01 PM    POTASSIUM 3.9 09/10/2020 05:01 PM    CHLORIDE 99 09/10/2020 05:01 PM    CO2 25 09/10/2020 05:01 PM    GLUCOSE 111 (H) 09/10/2020 05:01 PM    BUN 50 (H) 09/10/2020 05:01 PM    CREATININE 1.10 09/10/2020 05:01 PM      Recent Labs     09/10/20  1447   WBC 8.8   RBC 4.13*   HEMOGLOBIN 13.3   HEMATOCRIT 40.3   MCV 97.6   MCH 32.2   RDW 49.2   PLATELETCT 142*   MPV 11.6   NEUTSPOLYS 88.30*   LYMPHOCYTES 6.00*   MONOCYTES 4.90   EOSINOPHILS 0.10   BASOPHILS 0.20     Troponin: 53      EKG: Per my read, Atrial fibrillation with HR of 95, 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    Previous Data Review: reviewed    Problem Representation:      Ana Torres is a 86yo F with hx of CHF, CAD, HTN, HLD, presenting for GLF without head trauma/LOC; found to have hypoglycemia and hypotensive. Undergoing cardiac workup and glucose supplementation.    Fall  Assessment & Plan  Fell to floor morning of 9/10; it did not hit head or lose consciousness, however, she does sound like she had some confusion afterwards. States that her feet slid out from under her, lives independently and uses walker/cane. EMS was called by  neighbours who check in on her regularly, and at that time she was found to be hypoglycemic and hypotensive. Has a history of CHF, atrial fibrillation, and these might have been contributing factors, however, the most obvious potential causes are orthostatic hypotension, hypoglycemia, dehydration. CT head was negative for acute processes, she is mentally clear on exam and improved from what was reported in notes.  -D50 IVF  -NS @ 83mL/hr  -Lisinopril 10mg PO  -holding coreg  -Orthostatic BP    CHF (congestive heart failure) ([facility])  Assessment & Plan  History of CHF her last ECHO was in spring of 2019, which showed EF of 45%. She states that her heart conditions are well controlled despite today's events. She has trace pitting edema of the LE and very subtle bibasilar crackles. She takes lasix and spironolactone at home and reports doing so regularly. However, due to her initial presentation of hypotension, will hold off on continuing diuretics during the night. Will also adjust antihypertensives. Troponin was elevated but no BNP was initially drawn.  -lisinopril 10mg PO qD  -Hold Coreg  -Hold spironolactone/lasix  -ECHO  -BNP    HTN (hypertension)- (present on admission)  Assessment & Plan  Hypotensive by EMS, was hypertensive at ED evaluation. Reports regularly taking lisinopril, coreg, lasix, spironolactone at home. Given that her presentation may be related to hypotension, will hold diuretics and coreg. Lisinopril dose reduced.  -Day team to evaluate appropriate antihypertensive regimen for admission    Hypoglycemia associated with diabetes (HCC)- (present on admission)  Assessment & Plan  Hypoglycemia during symptoms, corrected after D50 by EMS. Reports using 6u Lantus for basal control, which she last took last night.  -SSI low intensity  -BG protocols    Atrial fibrillation (HCC)  Assessment & Plan  History of atrial fibrillation, could be a component of her heart failure. Appears to have been relatively  controlled prior to admission. Was given atropine in the field. At ED evaluation, was regularly irregular with rate of 70-80. Radial pulses 2+, but dorsalis pulses were 1+ bilaterally. She also has been on eliquis, chart review does not reveal reasoning behind this anticoagulation opposed to ASA. Will inquire for collateral for additional information. Given her age, it may be appropriate to discontinue anticoagulation considering bleeding risk. For now, will continue Eliquis.  -Continue home eliquis  -holding coreg for concerns of bradycardia  -Tele monitoring    CAD (coronary artery disease)- (present on admission)  Assessment & Plan  History of CAD with CABG done in 2004.  -continue Atorvastatin

## 2020-09-11 NOTE — PROGRESS NOTES
Triage officer note    ER physician requested admission Dr. Garcia    85 old female presented to the hospital after she had a ground-level fall.  She found to have hypotension, bradycardia and hypoglycemia.  She received atropine en route to the hospital.  At the time of evaluation patient heart rate is in 50s.      I requested [facility] internal medicine resident Dr. Box    [facility] internal medicine will admit this patient.

## 2020-09-11 NOTE — ED NOTES
Pharmacy Medication Reconciliation      Medication reconciliation updated and complete per pt at bedside  Allergies have been verified and updated   No oral ABX within the last 14 days  Pt home pharmacy:[facility].

## 2020-09-11 NOTE — ASSESSMENT & PLAN NOTE
Hypoglycemia during symptoms, corrected after D50 by EMS. At home, uses 10 units glargine. Repeat A1C at 6.4% this admission.  -continue to monitor  -SSI  -BG protocols  -d/c home glargine  -follow up with PCP as outpatient to reassess glucose levels

## 2020-09-11 NOTE — PROGRESS NOTES
Daily Progress Note:     Date of Service: 9/11/2020  Primary Team: [facility] Blue Team   Attending: Natividad Wiggins MD  Senior Resident: Dr. Russell  Intern: Dr. Santiago  Contact:  [phone]    Chief Complaint:    Syncope    Subjective:   No acute events overnight. Patient is well this AM. States she has no complaints at this time. Unsure of why she is on eliquis. Denies nausea, vomiting, abdominal pain, fevers, chills, night sweats.    Consultants/Specialty:  None    Review of Systems:    Review of Systems   Constitutional: Negative for chills, fever and malaise/fatigue.   Eyes: Negative for blurred vision, double vision and photophobia.   Respiratory: Negative for cough, shortness of breath and wheezing.    Cardiovascular: Negative for chest pain, palpitations and leg swelling.   Gastrointestinal: Negative for abdominal pain, constipation, diarrhea, heartburn, nausea and vomiting.   Genitourinary: Negative for dysuria, frequency and urgency.   Musculoskeletal: Negative for back pain and myalgias.   Skin: Negative for itching and rash.   Neurological: Negative for dizziness, focal weakness, weakness and headaches.   Psychiatric/Behavioral: Negative for depression and suicidal ideas.       Objective Data:   Physical Exam:   Vitals:   Temp:  [36 °C (96.8 °F)-36.8 °C (98.2 °F)] 36.8 °C (98.2 °F)  Pulse:  [53-71] 55  Resp:  [16-21] 18  BP: ()/(46-77) 106/59  SpO2:  [90 %-97 %] 94 %    Physical Exam  Constitutional:       General: She is not in acute distress.     Appearance: Normal appearance. She is normal weight. She is not ill-appearing.   HENT:      Head: Normocephalic and atraumatic.      Nose: Nose normal.      Mouth/Throat:      Mouth: Mucous membranes are dry.      Pharynx: Oropharynx is clear.   Eyes:      General: No scleral icterus.     Extraocular Movements: Extraocular movements intact.      Conjunctiva/sclera: Conjunctivae normal.      Pupils: Pupils are equal, round, and reactive to light.    Neck:      Musculoskeletal: Normal range of motion and neck supple.   Cardiovascular:      Rate and Rhythm: Normal rate and regular rhythm.      Pulses: Normal pulses.      Heart sounds: Normal heart sounds. No murmur. No gallop.    Pulmonary:      Effort: Pulmonary effort is normal.      Breath sounds: Normal breath sounds. No wheezing, rhonchi or rales.   Abdominal:      General: Abdomen is flat. Bowel sounds are normal. There is no distension.      Palpations: Abdomen is soft.      Tenderness: There is no abdominal tenderness. There is no right CVA tenderness, left CVA tenderness or guarding.   Skin:     General: Skin is warm and dry.   Neurological:      General: No focal deficit present.      Mental Status: She is alert and oriented to person, place, and time. Mental status is at baseline.      Cranial Nerves: No cranial nerve deficit.      Sensory: No sensory deficit.   Psychiatric:         Mood and Affect: Mood normal.         Behavior: Behavior normal.         Thought Content: Thought content normal.         Judgment: Judgment normal.       Labs:   Recent Results (from the past 24 hour(s))   PROTHROMBIN TIME    Collection Time: 09/10/20  3:29 PM   Result Value Ref Range    PT 16.6 (H) 12.0 - 14.6 sec    INR 1.30 (H) 0.87 - 1.13   Comp Metabolic Panel    Collection Time: 09/10/20  5:01 PM   Result Value Ref Range    Sodium 140 135 - 145 mmol/L    Potassium 3.9 3.6 - 5.5 mmol/L    Chloride 99 96 - 112 mmol/L    Co2 25 20 - 33 mmol/L    Anion Gap 16.0 7.0 - 16.0    Glucose 111 (H) 65 - 99 mg/dL    Bun 50 (H) 8 - 22 mg/dL    Creatinine 1.10 0.50 - 1.40 mg/dL    Calcium 9.6 8.5 - 10.5 mg/dL    AST(SGOT) 31 12 - 45 U/L    ALT(SGPT) 31 2 - 50 U/L    Alkaline Phosphatase 89 30 - 99 U/L    Total Bilirubin 1.1 0.1 - 1.5 mg/dL    Albumin 3.5 3.2 - 4.9 g/dL    Total Protein 6.7 6.0 - 8.2 g/dL    Globulin 3.2 1.9 - 3.5 g/dL    A-G Ratio 1.1 g/dL   TROPONIN    Collection Time: 09/10/20  5:01 PM   Result Value Ref Range     Troponin T 53 (H) 6 - 19 ng/L   ESTIMATED GFR    Collection Time: 09/10/20  5:01 PM   Result Value Ref Range    GFR If  57 (A) >60 mL/min/1.73 m 2    GFR If Non  47 (A) >60 mL/min/1.73 m 2   URINALYSIS    Collection Time: 09/10/20  6:05 PM    Specimen: Urine, Clean Catch   Result Value Ref Range    Color Yellow     Character Cloudy (A)     Specific Gravity 1.009 <1.035    Ph 5.0 5.0 - 8.0    Glucose Negative Negative mg/dL    Ketones Negative Negative mg/dL    Protein 30 (A) Negative mg/dL    Bilirubin Negative Negative    Urobilinogen, Urine 0.2 Negative    Nitrite Negative Negative    Leukocyte Esterase Small (A) Negative    Occult Blood Small (A) Negative    Micro Urine Req Microscopic    URINE MICROSCOPIC (W/UA)    Collection Time: 09/10/20  6:05 PM   Result Value Ref Range    -150 (A) /hpf    RBC 2-5 (A) /hpf    Bacteria Moderate (A) None /hpf    Epithelial Cells Negative /hpf    Hyaline Cast 0-2 /lpf   ACCU-CHEK GLUCOSE    Collection Time: 09/10/20  9:37 PM   Result Value Ref Range    Glucose - Accu-Ck 61 (L) 65 - 99 mg/dL   ACCU-CHEK GLUCOSE    Collection Time: 09/11/20  1:16 AM   Result Value Ref Range    Glucose - Accu-Ck 199 (H) 65 - 99 mg/dL   CBC with Differential    Collection Time: 09/11/20  5:44 AM   Result Value Ref Range    WBC 8.4 4.8 - 10.8 K/uL    RBC 3.58 (L) 4.20 - 5.40 M/uL    Hemoglobin 11.5 (L) 12.0 - 16.0 g/dL    Hematocrit 34.2 (L) 37.0 - 47.0 %    MCV 95.5 81.4 - 97.8 fL    MCH 32.1 27.0 - 33.0 pg    MCHC 33.6 33.6 - 35.0 g/dL    RDW 48.5 35.9 - 50.0 fL    Platelet Count 271 164 - 446 K/uL    MPV 9.9 9.0 - 12.9 fL    Neutrophils-Polys 80.50 (H) 44.00 - 72.00 %    Lymphocytes 8.60 (L) 22.00 - 41.00 %    Monocytes 9.20 0.00 - 13.40 %    Eosinophils 0.80 0.00 - 6.90 %    Basophils 0.40 0.00 - 1.80 %    Immature Granulocytes 0.50 0.00 - 0.90 %    Nucleated RBC 0.00 /100 WBC    Neutrophils (Absolute) 6.75 2.00 - 7.15 K/uL    Lymphs (Absolute) 0.72 (L)  1.00 - 4.80 K/uL    Monos (Absolute) 0.77 0.00 - 0.85 K/uL    Eos (Absolute) 0.07 0.00 - 0.51 K/uL    Baso (Absolute) 0.03 0.00 - 0.12 K/uL    Immature Granulocytes (abs) 0.04 0.00 - 0.11 K/uL    NRBC (Absolute) 0.00 K/uL   Comp Metabolic Panel (CMP)    Collection Time: 09/11/20  5:44 AM   Result Value Ref Range    Sodium 139 135 - 145 mmol/L    Potassium 3.3 (L) 3.6 - 5.5 mmol/L    Chloride 100 96 - 112 mmol/L    Co2 26 20 - 33 mmol/L    Anion Gap 13.0 7.0 - 16.0    Glucose 148 (H) 65 - 99 mg/dL    Bun 54 (H) 8 - 22 mg/dL    Creatinine 1.52 (H) 0.50 - 1.40 mg/dL    Calcium 9.2 8.5 - 10.5 mg/dL    AST(SGOT) 28 12 - 45 U/L    ALT(SGPT) 24 2 - 50 U/L    Alkaline Phosphatase 78 30 - 99 U/L    Total Bilirubin 0.7 0.1 - 1.5 mg/dL    Albumin 3.0 (L) 3.2 - 4.9 g/dL    Total Protein 5.8 (L) 6.0 - 8.2 g/dL    Globulin 2.8 1.9 - 3.5 g/dL    A-G Ratio 1.1 g/dL   proBrain Natriuretic Peptide, NT    Collection Time: 09/11/20  5:44 AM   Result Value Ref Range    NT-proBNP 45147 (H) 0 - 125 pg/mL   TSH WITH REFLEX TO FT4    Collection Time: 09/11/20  5:44 AM   Result Value Ref Range    TSH 1.190 0.380 - 5.330 uIU/mL   ESTIMATED GFR    Collection Time: 09/11/20  5:44 AM   Result Value Ref Range    GFR If  39 (A) >60 mL/min/1.73 m 2    GFR If Non  33 (A) >60 mL/min/1.73 m 2   FOLATE    Collection Time: 09/11/20  5:44 AM   Result Value Ref Range    Folate -Folic Acid 35.9 >4.0 ng/mL   VITAMIN B12    Collection Time: 09/11/20  5:44 AM   Result Value Ref Range    Vitamin B12 -True Cobalamin 1381 (H) 211 - 911 pg/mL   HEMOGLOBIN A1C    Collection Time: 09/11/20  5:44 AM   Result Value Ref Range    Glycohemoglobin 6.4 (H) 0.0 - 5.6 %    Est Avg Glucose 137 mg/dL   ACCU-CHEK GLUCOSE    Collection Time: 09/11/20 12:03 PM   Result Value Ref Range    Glucose - Accu-Ck 201 (H) 65 - 99 mg/dL   EKG    Collection Time: 09/11/20 12:10 PM   Result Value Ref Range    Report       [facility]    Test Date:   2020  Pt Name:    MORRO PINZON            Department: 171  MRN:        [id number]                      Room:       T716  Gender:     Female                       Technician: ANN  :        [id number]                   Requested By:ISABELLE RODRIGUES  Order #:    [id number]                    Reading MD: Reuben Shepherd MD    Measurements  Intervals                                Axis  Rate:       59                           P:          0  WI:         294                          QRS:        34  QRSD:       112                          T:          116  QT:         468  QTc:        464    Interpretive Statements  SINUS BRADYCARDIA  ATRIAL PREMATURE COMPLEX  FIRST DEGREE AV BLOCK  PROBABLE LEFT ATRIAL ABNORMALITY  NONSPECIFIC INTRAVENTRICULAR CONDUCTION DELAY  LVH WITH SECONDARY REPOLARIZATION ABNORMALITY  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 No acute intracranial abnormality. No evidence of acute intracranial hemorrhage or mass lesion.               DX-CHEST-PORTABLE (1 VIEW)   Final Result      Cardiomegaly with mild pulmonary vascular congestion and interstitial edema.      Atherosclerotic plaque.      Stable elevation of the right hemidiaphragm.         EC-ECHOCARDIOGRAM COMPLETE W/O CONT    (Results Pending)   IR-US GUIDED PIV    (Results Pending)       Problem Representation:    CHF (congestive heart failure) ([facility])  Assessment & Plan  History of CHF her last ECHO was in spring of 2019, which showed EF of 45%. She has trace pitting edema of the LE and very subtle bibasilar crackles. She takes lasix and spironolactone at home and reports doing so regularly. Troponin elevated at 53 [place] [address].  -lisinopril 10mg PO qD  -carvedilol 6.25mg BID  -Hold spironolactone/lasix  -ECHO ordered    Fall  Assessment & Plan  Fell to floor  morning of 9/10; denies having hit head, but doesn't remember the time between being on floor and EMS getting her. States that her feet slid out from under her, lives independently and uses walker/cane. EMS was called by neighbours who check in on her regularly, and at that time she was found to be hypoglycemic and hypotensive.CT head was negative for acute processes, she is mentally clear on exam and improved from what was reported in notes; currently at baseline.  -Lisinopril 10mg PO from 20mg  -carvedilol 6.25mg BID from 25 BID  -holding lasix, spironolactone  -Orthostatic BP  -Echo ordered    HTN (hypertension)- (present on admission)  Assessment & Plan  Hypotensive by EMS, was hypertensive at ED evaluation. Reports regularly taking lisinopril, coreg, lasix, spironolactone at home. Given that her presentation may be related to hypotension, will hold diuretics and coreg.  -continue dose reduced lisinopril 10 mg qd  -dose reduce carvedilol 6.25 mg BID  -continue to hold furosemide, spironolactone    Hypoglycemia associated with diabetes (HCC)- (present on admission)  Assessment & Plan  Hypoglycemia during symptoms, corrected after D50 by EMS. At home, uses 10 units glargine.  -continue to monitor  -SSI low intensity  -BG protocols    JOSE C (acute kidney injury) (HCC)  Assessment & Plan  On admission patient Cr at 1.1, but on 9/11 patient Cr increased to 1.52 concerning for JOSE C. Patient baseline Cr at 1.3-1.5. Given she also has a history of CHF, consider gentle fluid resuscitation.  -consider NS mIVF at 50-75cc/hr pending Echo results    Atrial fibrillation (HCC)  Assessment & Plan  History of atrial fibrillation, could be a component of her heart failure. At ED evaluation, was regularly irregular with rate of 70-80. On eliquis for Afib and CHF.  -Continue home eliquis  -dose reduce carvedilol 6.25mg BID  -Tele monitoring    CAD (coronary artery disease)- (present on admission)  Assessment & Plan  History of CAD  with CABG done in 2004.  -continue Atorvastatin    Torin Santiago MD  PGY1 Internal Medicine

## 2020-09-12 LAB
ANION GAP SERPL CALC-SCNC: 12 MMOL/L (ref 7–16)
BUN SERPL-MCNC: 46 MG/DL (ref 8–22)
CALCIUM SERPL-MCNC: 9.4 MG/DL (ref 8.5–10.5)
CHLORIDE SERPL-SCNC: 106 MMOL/L (ref 96–112)
CO2 SERPL-SCNC: 24 MMOL/L (ref 20–33)
CREAT SERPL-MCNC: 1.36 MG/DL (ref 0.5–1.4)
ERYTHROCYTE [DISTWIDTH] IN BLOOD BY AUTOMATED COUNT: 51.1 FL (ref 35.9–50)
GLUCOSE BLD-MCNC: 118 MG/DL (ref 65–99)
GLUCOSE BLD-MCNC: 133 MG/DL (ref 65–99)
GLUCOSE BLD-MCNC: 141 MG/DL (ref 65–99)
GLUCOSE BLD-MCNC: 150 MG/DL (ref 65–99)
GLUCOSE BLD-MCNC: 197 MG/DL (ref 65–99)
GLUCOSE SERPL-MCNC: 144 MG/DL (ref 65–99)
HCT VFR BLD AUTO: 37.6 % (ref 37–47)
HGB BLD-MCNC: 11.9 G/DL (ref 12–16)
MAGNESIUM SERPL-MCNC: 1.6 MG/DL (ref 1.5–2.5)
MCH RBC QN AUTO: 31.9 PG (ref 27–33)
MCHC RBC AUTO-ENTMCNC: 31.6 G/DL (ref 33.6–35)
MCV RBC AUTO: 100.8 FL (ref 81.4–97.8)
PHOSPHATE SERPL-MCNC: 2.4 MG/DL (ref 2.5–4.5)
PLATELET # BLD AUTO: 273 K/UL (ref 164–446)
PMV BLD AUTO: 10.2 FL (ref 9–12.9)
POTASSIUM SERPL-SCNC: 4.1 MMOL/L (ref 3.6–5.5)
RBC # BLD AUTO: 3.73 M/UL (ref 4.2–5.4)
SODIUM SERPL-SCNC: 142 MMOL/L (ref 135–145)
WBC # BLD AUTO: 9.2 K/UL (ref 4.8–10.8)

## 2020-09-12 PROCEDURE — 700102 HCHG RX REV CODE 250 W/ 637 OVERRIDE(OP): Performed by: STUDENT IN AN ORGANIZED HEALTH CARE EDUCATION/TRAINING PROGRAM

## 2020-09-12 PROCEDURE — 36415 COLL VENOUS BLD VENIPUNCTURE: CPT

## 2020-09-12 PROCEDURE — 85027 COMPLETE CBC AUTOMATED: CPT

## 2020-09-12 PROCEDURE — A9270 NON-COVERED ITEM OR SERVICE: HCPCS | Performed by: STUDENT IN AN ORGANIZED HEALTH CARE EDUCATION/TRAINING PROGRAM

## 2020-09-12 PROCEDURE — 770020 HCHG ROOM/CARE - TELE (206)

## 2020-09-12 PROCEDURE — 99223 1ST HOSP IP/OBS HIGH 75: CPT | Mod: AI,GC | Performed by: INTERNAL MEDICINE

## 2020-09-12 PROCEDURE — 83735 ASSAY OF MAGNESIUM: CPT

## 2020-09-12 PROCEDURE — 80048 BASIC METABOLIC PNL TOTAL CA: CPT

## 2020-09-12 PROCEDURE — 84100 ASSAY OF PHOSPHORUS: CPT

## 2020-09-12 PROCEDURE — 82962 GLUCOSE BLOOD TEST: CPT | Mod: 91

## 2020-09-12 RX ADMIN — CARVEDILOL 6.25 MG: 6.25 TABLET, FILM COATED ORAL at 07:30

## 2020-09-12 RX ADMIN — CARVEDILOL 6.25 MG: 6.25 TABLET, FILM COATED ORAL at 17:30

## 2020-09-12 RX ADMIN — APIXABAN 2.5 MG: 2.5 TABLET, FILM COATED ORAL at 05:35

## 2020-09-12 RX ADMIN — INSULIN HUMAN 1 UNITS: 100 INJECTION, SOLUTION PARENTERAL at 20:47

## 2020-09-12 RX ADMIN — DOCUSATE SODIUM 50 MG AND SENNOSIDES 8.6 MG 2 TABLET: 8.6; 5 TABLET, FILM COATED ORAL at 05:35

## 2020-09-12 RX ADMIN — APIXABAN 2.5 MG: 2.5 TABLET, FILM COATED ORAL at 18:00

## 2020-09-12 RX ADMIN — POTASSIUM CHLORIDE 40 MEQ: 1500 TABLET, EXTENDED RELEASE ORAL at 05:35

## 2020-09-12 RX ADMIN — ATORVASTATIN CALCIUM 80 MG: 80 TABLET, FILM COATED ORAL at 18:00

## 2020-09-12 RX ADMIN — LISINOPRIL 10 MG: 10 TABLET ORAL at 05:35

## 2020-09-12 ASSESSMENT — ENCOUNTER SYMPTOMS
VOMITING: 0
BACK PAIN: 0
WEAKNESS: 0
FOCAL WEAKNESS: 0
FEVER: 0
HEADACHES: 0
PALPITATIONS: 0
BLURRED VISION: 0
COUGH: 0
CHILLS: 0
DIARRHEA: 0
WHEEZING: 0
CONSTIPATION: 0
NAUSEA: 0
PHOTOPHOBIA: 0
DIZZINESS: 0
SHORTNESS OF BREATH: 0
MYALGIAS: 0
DEPRESSION: 0
HEARTBURN: 0
DOUBLE VISION: 0
ABDOMINAL PAIN: 0

## 2020-09-12 ASSESSMENT — FIBROSIS 4 INDEX: FIB4 SCORE: 1.74

## 2020-09-12 NOTE — CARE PLAN
Problem: Safety  Goal: Will remain free from falls  Outcome: PROGRESSING AS EXPECTED     Problem: Venous Thromboembolism (VTW)/Deep Vein Thrombosis (DVT) Prevention:  Goal: Patient will participate in Venous Thrombosis (VTE)/Deep Vein Thrombosis (DVT)Prevention Measures  Outcome: PROGRESSING AS EXPECTED     Problem: Bowel/Gastric:  Goal: Normal bowel function is maintained or improved  Outcome: PROGRESSING AS EXPECTED     Problem: Discharge Barriers/Planning  Goal: Patient's continuum of care needs will be met  Outcome: PROGRESSING AS EXPECTED

## 2020-09-12 NOTE — PROGRESS NOTES
Daily Progress Note:     Date of Service: 9/12/2020  Primary Team: [facility] Blue Team   Attending: Natividad Wiggins MD  Senior Resident: Dr. Russell  Intern: Dr. Santiago  Contact:  [phone]    Chief Complaint:    Syncope    Subjective:   No acute events overnight. Patient is well this AM. States that she talked to a cardiologist yesterday and now has someone to follow up with as outpatient. This AM patient denies having any nausea, vomiting, abdominal pain, dizziness, HA, or vision changes.    Consultants/Specialty:  None    Review of Systems:    Review of Systems   Constitutional: Negative for chills, fever and malaise/fatigue.   Eyes: Negative for blurred vision, double vision and photophobia.   Respiratory: Negative for cough, shortness of breath and wheezing.    Cardiovascular: Negative for chest pain, palpitations and leg swelling.   Gastrointestinal: Negative for abdominal pain, constipation, diarrhea, heartburn, nausea and vomiting.   Genitourinary: Negative for dysuria, frequency and urgency.   Musculoskeletal: Negative for back pain and myalgias.   Skin: Negative for itching and rash.   Neurological: Negative for dizziness, focal weakness, weakness and headaches.   Psychiatric/Behavioral: Negative for depression and suicidal ideas.       Objective Data:   Physical Exam:   Vitals:   Temp:  [36.3 °C (97.4 °F)-36.7 °C (98.1 °F)] 36.7 °C (98.1 °F)  Pulse:  [61-75] 70  Resp:  [15-18] 16  BP: (122-167)/(64-89) 125/80  SpO2:  [91 %-95 %] 92 %    Physical Exam  Constitutional:       General: She is not in acute distress.     Appearance: Normal appearance. She is normal weight. She is not ill-appearing.   HENT:      Head: Normocephalic and atraumatic.      Nose: Nose normal.      Mouth/Throat:      Mouth: Mucous membranes are dry.      Pharynx: Oropharynx is clear.   Eyes:      General: No scleral icterus.     Extraocular Movements: Extraocular movements intact.      Conjunctiva/sclera: Conjunctivae normal.       Pupils: Pupils are equal, round, and reactive to light.   Neck:      Musculoskeletal: Normal range of motion and neck supple.   Cardiovascular:      Rate and Rhythm: Normal rate and regular rhythm.      Pulses: Normal pulses.      Heart sounds: Normal heart sounds. No murmur. No gallop.    Pulmonary:      Effort: Pulmonary effort is normal.      Breath sounds: Normal breath sounds. No wheezing, rhonchi or rales.   Abdominal:      General: Abdomen is flat. Bowel sounds are normal. There is no distension.      Palpations: Abdomen is soft.      Tenderness: There is no abdominal tenderness. There is no right CVA tenderness, left CVA tenderness or guarding.   Skin:     General: Skin is warm and dry.   Neurological:      General: No focal deficit present.      Mental Status: She is alert and oriented to person, place, and time. Mental status is at baseline.      Cranial Nerves: No cranial nerve deficit.      Sensory: No sensory deficit.   Psychiatric:         Mood and Affect: Mood normal.         Behavior: Behavior normal.         Thought Content: Thought content normal.         Judgment: Judgment normal.       Labs:   Recent Results (from the past 24 hour(s))   EC-ECHOCARDIOGRAM COMPLETE W/O CONT    Collection Time: 09/11/20  8:51 PM   Result Value Ref Range    Eject.Frac. MOD BP 48.73     Eject.Frac. MOD 4C 47.49     Eject.Frac. MOD 2C 51.49     Left Ventrical Ejection Fraction 40    ACCU-CHEK GLUCOSE    Collection Time: 09/11/20  9:07 PM   Result Value Ref Range    Glucose - Accu-Ck 128 (H) 65 - 99 mg/dL   CBC WITHOUT DIFFERENTIAL    Collection Time: 09/12/20  6:30 AM   Result Value Ref Range    WBC 9.2 4.8 - 10.8 K/uL    RBC 3.73 (L) 4.20 - 5.40 M/uL    Hemoglobin 11.9 (L) 12.0 - 16.0 g/dL    Hematocrit 37.6 37.0 - 47.0 %    .8 (H) 81.4 - 97.8 fL    MCH 31.9 27.0 - 33.0 pg    MCHC 31.6 (L) 33.6 - 35.0 g/dL    RDW 51.1 (H) 35.9 - 50.0 fL    Platelet Count 273 164 - 446 K/uL    MPV 10.2 9.0 - 12.9 fL   Basic  Metabolic Panel    Collection Time: 09/12/20  6:30 AM   Result Value Ref Range    Sodium 142 135 - 145 mmol/L    Potassium 4.1 3.6 - 5.5 mmol/L    Chloride 106 96 - 112 mmol/L    Co2 24 20 - 33 mmol/L    Glucose 144 (H) 65 - 99 mg/dL    Bun 46 (H) 8 - 22 mg/dL    Creatinine 1.36 0.50 - 1.40 mg/dL    Calcium 9.4 8.5 - 10.5 mg/dL    Anion Gap 12.0 7.0 - 16.0   MAGNESIUM    Collection Time: 09/12/20  6:30 AM   Result Value Ref Range    Magnesium 1.6 1.5 - 2.5 mg/dL   PHOSPHORUS    Collection Time: 09/12/20  6:30 AM   Result Value Ref Range    Phosphorus 2.4 (L) 2.5 - 4.5 mg/dL   ESTIMATED GFR    Collection Time: 09/12/20  6:30 AM   Result Value Ref Range    GFR If African American 45 (A) >60 mL/min/1.73 m 2    GFR If Non  37 (A) >60 mL/min/1.73 m 2   ACCU-CHEK GLUCOSE    Collection Time: 09/12/20  8:33 AM   Result Value Ref Range    Glucose - Accu-Ck 133 (H) 65 - 99 mg/dL   ACCU-CHEK GLUCOSE    Collection Time: 09/12/20 12:55 PM   Result Value Ref Range    Glucose - Accu-Ck 141 (H) 65 - 99 mg/dL       Imaging:   Independant Imaging Review: Completed  EC-ECHOCARDIOGRAM COMPLETE W/O CONT   Final Result      IR-US GUIDED PIV   Final Result    Ultrasound-guided PERIPHERAL IV INSERTION performed by    qualified nursing staff as above.      CT-HEAD W/O   Final Result         1. No acute intracranial abnormality. No evidence of acute intracranial hemorrhage or mass lesion.               DX-CHEST-PORTABLE (1 VIEW)   Final Result      Cardiomegaly with mild pulmonary vascular congestion and interstitial edema.      Atherosclerotic plaque.      Stable elevation of the right hemidiaphragm.             Problem Representation:    CHF (congestive heart failure) (HCC)  Assessment & Plan  History of CHF her last ECHO was in spring of 2019, which showed EF of 45%. She has trace pitting edema of the LE and very subtle bibasilar crackles. She takes lasix and spironolactone at home and reports doing so regularly. Troponin  elevated at 53 [place] [address]. Echo demonstrates LVEF 40%, RV systolic pressure 100 mmHg, and basal hyperkinesis with apical hypokinesis, and severe aortic stenosis.  -lisinopril 10mg PO qD  -carvedilol 6.25mg BID  -Hold spironolactone/lasix  -follow up cardiology as outpatient    Fall  Assessment & Plan  Fell morning of 9/10 and may have had LOC from time of being on floor before being seen by EMS. Likely 2/2 hypoglycemia vs dehydration. Echo demonstrates LVEF 40%, RV systolic pressure 100 mmHg, and basal hyperkinesis with apical hypokinesis, and severe aortic stenosis.  -Lisinopril 10mg PO from 20mg  -carvedilol 6.25mg BID from 25 BID  -holding lasix, spironolactone    HTN (hypertension)- (present on admission)  Assessment & Plan  Has history of hypertension on home lisinopril, carvedilol, furosemide, and spironolactone  -continue dose reduced lisinopril 10 mg qd  -dose reduce carvedilol 6.25 mg BID  -continue to hold furosemide, spironolactone    Hypoglycemia associated with diabetes (HCC)- (present on admission)  Assessment & Plan  Hypoglycemia during symptoms, corrected after D50 by EMS. At home, uses 10 units glargine.  -continue to monitor  -SSI  -BG protocols  -given normal blood glucose here, may not need insulin as outpatient    JOSE C (acute kidney injury) (HCC)  Assessment & Plan  On admission patient Cr at 1.1, but on 9/11 patient Cr increased to 1.52 concerning for JOSE C. Patient baseline Cr at 1.3-1.5. Cr improving.  -continue to encourage oral rehydration    Atrial fibrillation (HCC)  Assessment & Plan  History of atrial fibrillation, could be a component of her heart failure. At ED evaluation, was regularly irregular with rate of 70-80. On eliquis for Afib and CHF.  -Continue home eliquis  -dose reduce carvedilol 6.25mg BID  -Tele monitoring    CAD (coronary artery disease)- (present on admission)  Assessment & Plan  History of CAD with CABG done in 2004.  -continue Atorvastatin, carvedilol,  lisinopril    Torin Santiago MD  PGY1 Internal Medicine

## 2020-09-12 NOTE — NON-PROVIDER
Date of Admission: 9/10/2020  [facility] Team: [facility]  Attending: Dr. Wiggins  Senior Resident: Dr. Welch  Intern: Dr. Santiago    Chief Complaint:   Chief Complaint   Patient presents with   • ALOC       Subjective:  Ana Torres is a 85 y.o. female with PMH of CHF, CAD, T2DM, HTN, HLD who was admitted 9/10/2020 after a GLF upon trying to get out of bed with LOC afterwards. Pt states she feels well today without any nausea, vomiting, dizziness, weakness. Yesterday, the nurses were trying multiple sites to get an IV which she said was very painful and tiring but was able to sleep well at night. Nurse also reports episodes of 2nd degree type I heart block and a wandering KS which were asymptomatic. Denies any new symptoms including chest pain, nausea, dizziness, chest pain, abdominal pain, dysuria, frequency.    Review of Systems:   Positive for vomiting. Negative for nausea, dizziness, chest pain, abdominal pain, dysuria, frequency.    Objective Data:   Vitals:   Temp:  [36.3 °C (97.4 °F)-36.7 °C (98.1 °F)] 36.4 °C (97.5 °F)  Pulse:  [60-75] 66  Resp:  [15-18] 18  BP: (122-167)/(64-89) 167/80  SpO2:  [91 %-95 %] 95 %    Physical Exam:  Constitutional: Well-developed, well-nourished, and in no apparent distress.   HEENT: Normocephalic and atraumatic, PERRLA, EOMI. Poor dentition.  Neck: Normal range of motion.   Cardiovascular: Normal rate, regular rhythm and normal heart sounds.   Pulmonary/Chest: Breath sounds normal. No crackles noted.  Abdomen: Soft, non-tender. Normoactive bowel sounds  Back: No CVA tenderness  Musculoskeletal: Normal range of motion.  Extremities: Trace edema to bilateral lower extremities.  Neurological: A&Ox4, CN II-XII grossly intact, no focal deficits noted  Psychiatric: Affect normal.     Diagnostic Studies and Procedures:    Labs:   Recent Results (from the past 24 hour(s))   ACCU-CHEK GLUCOSE    Collection Time: 09/11/20  5:04 PM   Result Value Ref Range    Glucose - Accu-Ck 103  (H) 65 - 99 mg/dL   EC-ECHOCARDIOGRAM COMPLETE W/O CONT    Collection Time: 09/11/20  8:51 PM   Result Value Ref Range    Eject.Frac. MOD BP 48.73     Eject.Frac. MOD 4C 47.49     Eject.Frac. MOD 2C 51.49     Left Ventrical Ejection Fraction 40    ACCU-CHEK GLUCOSE    Collection Time: 09/11/20  9:07 PM   Result Value Ref Range    Glucose - Accu-Ck 128 (H) 65 - 99 mg/dL   CBC WITHOUT DIFFERENTIAL    Collection Time: 09/12/20  6:30 AM   Result Value Ref Range    WBC 9.2 4.8 - 10.8 K/uL    RBC 3.73 (L) 4.20 - 5.40 M/uL    Hemoglobin 11.9 (L) 12.0 - 16.0 g/dL    Hematocrit 37.6 37.0 - 47.0 %    .8 (H) 81.4 - 97.8 fL    MCH 31.9 27.0 - 33.0 pg    MCHC 31.6 (L) 33.6 - 35.0 g/dL    RDW 51.1 (H) 35.9 - 50.0 fL    Platelet Count 273 164 - 446 K/uL    MPV 10.2 9.0 - 12.9 fL   Basic Metabolic Panel    Collection Time: 09/12/20  6:30 AM   Result Value Ref Range    Sodium 142 135 - 145 mmol/L    Potassium 4.1 3.6 - 5.5 mmol/L    Chloride 106 96 - 112 mmol/L    Co2 24 20 - 33 mmol/L    Glucose 144 (H) 65 - 99 mg/dL    Bun 46 (H) 8 - 22 mg/dL    Creatinine 1.36 0.50 - 1.40 mg/dL    Calcium 9.4 8.5 - 10.5 mg/dL    Anion Gap 12.0 7.0 - 16.0   MAGNESIUM    Collection Time: 09/12/20  6:30 AM   Result Value Ref Range    Magnesium 1.6 1.5 - 2.5 mg/dL   PHOSPHORUS    Collection Time: 09/12/20  6:30 AM   Result Value Ref Range    Phosphorus 2.4 (L) 2.5 - 4.5 mg/dL   ESTIMATED GFR    Collection Time: 09/12/20  6:30 AM   Result Value Ref Range    GFR If African American 45 (A) >60 mL/min/1.73 m 2    GFR If Non  37 (A) >60 mL/min/1.73 m 2   ACCU-CHEK GLUCOSE    Collection Time: 09/12/20  8:33 AM   Result Value Ref Range    Glucose - Accu-Ck 133 (H) 65 - 99 mg/dL   ACCU-CHEK GLUCOSE    Collection Time: 09/12/20 12:55 PM   Result Value Ref Range    Glucose - Accu-Ck 141 (H) 65 - 99 mg/dL     Imaging:   EC-ECHOCARDIOGRAM COMPLETE W/O CONT   Final Result      IR-US GUIDED PIV   Final Result    Ultrasound-guided PERIPHERAL  IV INSERTION performed by    qualified nursing staff as above.      CT-HEAD W/O   Final Result         1. No acute intracranial abnormality. No evidence of acute intracranial hemorrhage or mass lesion.               DX-CHEST-PORTABLE (1 VIEW)   Final Result      Cardiomegaly with mild pulmonary vascular congestion and interstitial edema.      Atherosclerotic plaque.      Stable elevation of the right hemidiaphragm.           Assessment & Plan:  Ana Torres is a 85 y.o. female with PMH of CHF, CAD, T2DM, HTN, HLD who was admitted 9/10/2020 after a GLF upon trying to get out of bed with LOC afterwards.    # Ground Level Fall  - Etiology could be due to CHF, aFib, Hypoglycemia, Orthostatic hypotension, dehydration. Most likely due to hypoglycemia due to BC in the 40s with resolving symptoms after treatment with D50  - Orthostatics pending for evaluation  - PT/OT evaluated who states she is close to baseline and recommends home health for continued conditioning. OT will continue to f/u with the patient while they are here.    # Acute Kidney Injury  - Creatinine decreased, improved from yesterday  - FEuric acid pending for evaluation    # CHF  - Home dosage of 40mg Lasix, 25mg Spironolactone, 25mg Carvedilol, 20mg Lisinopril  - All held except started on 6.25mg Carvedilol, 10mg Lisinopril  - Last echo 2019 with EF of 45%. Repeat echo showed EF of 40% with a prior apical infarct w/ ballooning  - Seen by Dr. Schultz, Cardiology, who recommends outpt f/u for severe aortic stenosis and possible previous MI  - Concern for bradycardia and hypotension, will not resume home dosage    #Hypoglycemia  - SSi currently, only needed 2 units overnight  - 10 units Lantus in AM for home dosage  - A1c of 6.3 indicates patient is likely overtreated with history of T2DM   - States she has been on Lantus since diagnosis and has never tried oral medications  - Due to hypoglycemia, strict a1c, and minimal SSi needs, patient can likely  be switched to oral antiglycemics prior to discharge    # Asymptomatic Bacteruria  - Urinalysis shows small leukocyte esterase, 100-150 WBC, moderate bacteria  - Denies dysuria without CVA tenderness, flank pain  - Could cause delirium and confusion but unlikely to be resolved after a dose of D50    # Atrial Fibrillation  - Resume home 2.5mg Eliquis

## 2020-09-12 NOTE — THERAPY
"Occupational Therapy   Initial Evaluation     Patient Name: Ana Torres  Age:  85 y.o., Sex:  female  Medical Record #: [id number]  Today's Date: 9/11/2020     Precautions: Fall Risk    Assessment    Patient is 85 y.o. female with h/o CHF, DM, HTN, CAD, HLD, MI, CABG, admitted following GLF. Seen now for OT eval. Pt required assist for supine > EOB. Reports she uses \"lots of pillows\" at home to facilitate bed mobility. Pt is incontinent of urine at home and uses adult brief to manage. During assessment pt required assist to pull underpants up and down (to change pad) due to not wanting to take hands off FWW. Pt uses grab bar at home for standing shower. Recommended pt obtain shower chair for increased shower safety. Pt is likely close to baseline function however is requiring assist due to unfamiliar setting. Pt describes methods and routines she uses at home. Will follow for acute OT if remains in-house to maximize functional independence and safety.     Plan    Recommend Occupational Therapy 3 times per week until therapy goals are met for the following treatments:  Adaptive Equipment, Neuro Re-Education / Balance, Self Care/Activities of Daily Living and Therapeutic Activities.    DC Equipment Recommendations: Tub / Shower Seat  Discharge Recommendations: Recommend home health for continued occupational therapy services. Pt may benefit from post-acute transitional care, however adamantly refuses this option. Pt is likely close to her functional baseline, however functional performance is decreased in unfamiliar setting of hospital.     Subjective    \"I don't want to go anywhere but home\"     Objective     09/11/20 1647   Prior Living Situation   Housing / Facility [address] [facility]   Bathroom Set up Walk In Shower;Grab Bars   Equipment Owned 4-Wheel Walker;Front-Wheel Walker   Comments Pt lives alone. Report she can normally complete BADL without assit. Incontinent of urine baseline. Son check on pt 2x/week " and assists with shopping, yard work. Pt reports housework does not really happen anymore lately.    Active ROM Upper Body   Rt Shoulder Flexion Degrees 120   Lt Shoulder Flexion Degrees 100   Comments Long-standing shoulder limitations, arthritic changes to digits    Coordination Upper Body   Fine Motor Coordination Moderate dexterity impairments B hands; appears long-standing    Bed Mobility    Supine to Sit Minimal Assist  (flat bed, no rail )   Scooting Supervised  (seated)   ADL Assessment   Grooming Supervision;Standing  (oral care, hair care at sink )   Lower Body Dressing Moderate Assist  (undies up/down during toileting, total A socks )   Toileting Moderate Assist  (incontinent of urine at baseline, wears briefs )   Functional Mobility   Sit to Stand Minimal Assist  (from toilet )   Bed, Chair, Wheelchair Transfer Supervised   Toilet Transfers Minimal Assist   Transfer Method Stand Step  (FWW)   Short Term Goals   Short Term Goal # 1 Pt will complete ADL transfers with supv    Short Term Goal # 2 Pt will doff/don adult brief with supv

## 2020-09-12 NOTE — PROGRESS NOTES
MS:    SB/SR 51-70  f PVC, bihem, trigem, coup, 2* type I, acc idio 3 bts, wandering MT  .26/.08/.44

## 2020-09-12 NOTE — CONSULTS
Cardiology Consult Note:    Dylan Ruiz M.D.  Date & Time note created:    9/11/2020   10:02 PM     Referring MD:  Dr. Wiggins    Patient ID:   Name:             Ana Torres   YOB: 1935  Age:                 85 y.o.  female   MRN:               [id number]                                                             Reason for Consult:      Abnormal echo    History of Present Illness:    85-year-old female with a past medical history of CAD status post CABG in approximately 2006.  She has been having mechanical issues with weakness and fell.  There was no loss of consciousness but it is unclear.  She previously had a cardiologist but was lost to follow-up.  As part of her evaluation she was found to have an abnormal troponin.  Her BNP has been elevated as well.  An echocardiogram was obtained which shows an abnormal aortic valve with low flow and possible low-flow low gradient severe aortic stenosis.  On exam she does have a preserved A2 which is more consistent with mild to moderate aortic stenosis.  Furthermore she has apical ballooning with basal hypokinesis which could be consistent with her prior bypass surgery or stress-induced cardiomyopathy.  She currently denies cardiac symptoms.    Review of Systems:      Constitutional: Denies fevers, Denies weight changes  Eyes: Denies changes in vision, no eye pain  Ears/Nose/Throat/Mouth: Denies nasal congestion or sore throat   Cardiovascular: no chest pain, no palpitations   Respiratory: no shortness of breath , Denies cough  Gastrointestinal/Hepatic: Denies abdominal pain, nausea, vomiting, diarrhea, constipation or GI bleeding   Genitourinary: Denies dysuria or frequency  Musculoskeletal/Rheum: Denies  joint pain and swelling, no edema  Skin: Denies rash  Neurological: Denies headache, confusion, memory loss or focal weakness/parasthesias  Psychiatric: denies mood disorder   Endocrine: Angely thyroid problems  Heme/Oncology/Lymph  "Nodes: Denies enlarged lymph nodes, denies brusing or known bleeding disorder  All other systems were reviewed and are negative (AMA/CMS criteria)                Past Medical History:   Past Medical History:   Diagnosis Date   • CAD (coronary artery disease)    • CATARACT    • CHF (congestive heart failure) ([facility])    • Cystocele    • Diabetes     insulin   • DJD (degenerative joint disease)    • HTN (hypertension), benign    • Hyperlipidemia    • Hypertension    • Medical home    • Myocardial infarct ([facility]) 2000   • PVD (peripheral vascular disease) ([facility])    • Renal disorder     \"watching my kidneys\"     • Unspecified urinary incontinence    • Urinary bladder disorder      Active Hospital Problems    Diagnosis   • Fall [W19.XXXA]     Priority: Medium   • HTN (hypertension) [I10]     Priority: Medium   • Hypoglycemia associated with diabetes ([facility]) [E11.649]     Priority: Medium   • JOSE C (acute kidney injury) ([facility]) [N17.9]     Priority: Medium   • CAD (coronary artery disease) [I25.10]     Priority: Low   • Atrial fibrillation ([facility]) [I48.91]       Past Surgical History:  Past Surgical History:   Procedure Laterality Date   • CATARACT PHACO WITH IOL  3/12/2014    Performed by Murtaza Casey M.D. at SURGERY SAME DAY [facility] ORS   • MULTIPLE CORONARY ARTERY BYPASS  2004   • ABDOMINAL HYSTERECTOMY TOTAL         Hospital Medications:  Current Facility-Administered Medications   Medication Dose   • potassium chloride SA (Kdur) tablet 40 mEq  40 mEq   • carvedilol (COREG) tablet 6.25 mg  6.25 mg   • diphenhydrAMINE (BENADRYL) tablet/capsule 50 mg  50 mg   • senna-docusate (PERICOLACE or SENOKOT S) 8.6-50 MG per tablet 2 Tab  2 Tab    And   • polyethylene glycol/lytes (MIRALAX) PACKET 1 Packet  1 Packet    And   • magnesium hydroxide (MILK OF MAGNESIA) suspension 30 mL  30 mL    And   • bisacodyl (DULCOLAX) suppository 10 mg  10 mg   • atorvastatin (LIPITOR) tablet 80 mg  80 mg   • lisinopril (PRINIVIL) tablet 10 mg  10 mg "   • apixaban (ELIQUIS) tablet 2.5 mg  2.5 mg   • insulin regular (HumuLIN R,NovoLIN R) injection  1-6 Units    And   • glucose 4 g chewable tablet 16 g  16 g    And   • dextrose 50% (D50W) injection 50 mL  50 mL         Current Outpatient Medications:  Medications Prior to Admission   Medication Sig Dispense Refill Last Dose   • apixaban (ELIQUIS) 5mg Tab Take 5 mg by mouth 2 Times a Day.   9/9/2020 at PM   • atorvastatin (LIPITOR) 80 MG tablet Take 80 mg by mouth every morning.   9/9/2020 at AM   • carvedilol (COREG) 25 MG Tab Take 25 mg by mouth 2 Times a Day.   9/9/2020 at PM   • furosemide (LASIX) 40 MG Tab Take 40 mg by mouth 2 Times a Day.   9/9/2020 at PM   • lisinopril (PRINIVIL) 20 MG Tab Take 20 mg by mouth every morning.   9/9/2020 at AM   • insulin glargine (LANTUS) 100 UNIT/ML Solution Inject 10 Units as instructed every evening.   9/9/2020 at PM   • spironolactone (ALDACTONE) 25 MG Tab Take 25 mg by mouth every morning.   9/9/2020 at AM   • Multiple Vitamins-Minerals (MULTI COMPLETE PO) Take  by mouth.   9/9/2020 at AM       Medication Allergy:  Allergies   Allergen Reactions   • Tetracaine Hcl [Tetcaine]    • Vancomycin    • Vigamox [Moxifloxacin]        Family History:  Family History   Problem Relation Age of Onset   • Hypertension Mother    • Heart Disease Mother    • No Known Problems Maternal Grandmother    • No Known Problems Maternal Grandfather    • Diabetes Daughter    • Seizures Son    • No Known Problems Son    • Heart Disease Son        Social History:  Social History     Socioeconomic History   • Marital status:      Spouse name: Not on file   • Number of children: Not on file   • Years of education: Not on file   • Highest education level: Not on file   Occupational History   • Not on file   Social Needs   • Financial resource strain: Not on file   • Food insecurity     Worry: Never true     Inability: Never true   • Transportation needs     Medical: Not on file     Non-medical:  "Not on file   Tobacco Use   • Smoking status: Never Smoker   • Smokeless tobacco: Never Used   Substance and Sexual Activity   • Alcohol use: No   • Drug use: No   • Sexual activity: Never     Partners: Male   Lifestyle   • Physical activity     Days per week: Not on file     Minutes per session: Not on file   • Stress: Not on file   Relationships   • Social connections     Talks on phone: Not on file     Gets together: Not on file     Attends Zoroastrianism service: Not on file     Active member of club or organization: Not on file     Attends meetings of clubs or organizations: Not on file     Relationship status: Not on file   • Intimate partner violence     Fear of current or ex partner: Not on file     Emotionally abused: Not on file     Physically abused: Not on file     Forced sexual activity: Not on file   Other Topics Concern   • Not on file   Social History Narrative   • Not on file         Physical Exam:  Vitals/ General Appearance:   Weight/BMI: Body mass index is 22.4 kg/m².  /89   Pulse 65   Temp 36.3 °C (97.4 °F) (Temporal)   Resp 17   Ht 1.499 m (4' 11\")   Wt 50.3 kg (110 lb 14.3 oz)   SpO2 91%   Vitals:    09/11/20 1127 09/11/20 1156 09/11/20 1702 09/11/20 2005   BP: 106/59  131/84 152/89   Pulse: (!) 54 (!) 55 60 65   Resp: 18  18 17   Temp: 36.8 °C (98.2 °F)  36.7 °C (98.1 °F) 36.3 °C (97.4 °F)   TempSrc: Temporal  Temporal Temporal   SpO2:    91%   Weight:       Height:         Oxygen Therapy:  Pulse Oximetry: 91 %, O2 (LPM): 0, O2 Delivery Device: None - Room Air    Constitutional:   Well developed, Well nourished, No acute distress  HENMT:  Normocephalic, Atraumatic, Oropharynx moist mucous membranes, No oral exudates, Nose normal.  No thyromegaly.  Eyes:  EOMI, Conjunctiva normal, No discharge.  Neck:  Normal range of motion, No cervical tenderness,  no JVD.  Cardiovascular:  Normal heart rate, Normal rhythm, No murmurs, No rubs, No gallops.   Extremitites with intact distal pulses, no " cyanosis, or edema.  Lungs:  Normal breath sounds, breath sounds clear to auscultation bilaterally,  no crackles, no wheezing.   Abdomen: Bowel sounds normal, Soft, No tenderness, No guarding, No rebound, No masses, No hepatosplenomegaly.  Skin: Warm, Dry, No erythema, No rash, no induration.  Neurologic: Alert & oriented x 3, No focal deficits noted, cranial nerves II through X are grossly intact.  Psychiatric: Affect normal, Judgment normal, Mood normal.      MDM (Data Review):     Records reviewed and summarized in current documentation    Lab Data Review:  Recent Results (from the past 24 hour(s))   ACCU-CHEK GLUCOSE    Collection Time: 09/11/20  1:16 AM   Result Value Ref Range    Glucose - Accu-Ck 199 (H) 65 - 99 mg/dL   CBC with Differential    Collection Time: 09/11/20  5:44 AM   Result Value Ref Range    WBC 8.4 4.8 - 10.8 K/uL    RBC 3.58 (L) 4.20 - 5.40 M/uL    Hemoglobin 11.5 (L) 12.0 - 16.0 g/dL    Hematocrit 34.2 (L) 37.0 - 47.0 %    MCV 95.5 81.4 - 97.8 fL    MCH 32.1 27.0 - 33.0 pg    MCHC 33.6 33.6 - 35.0 g/dL    RDW 48.5 35.9 - 50.0 fL    Platelet Count 271 164 - 446 K/uL    MPV 9.9 9.0 - 12.9 fL    Neutrophils-Polys 80.50 (H) 44.00 - 72.00 %    Lymphocytes 8.60 (L) 22.00 - 41.00 %    Monocytes 9.20 0.00 - 13.40 %    Eosinophils 0.80 0.00 - 6.90 %    Basophils 0.40 0.00 - 1.80 %    Immature Granulocytes 0.50 0.00 - 0.90 %    Nucleated RBC 0.00 /100 WBC    Neutrophils (Absolute) 6.75 2.00 - 7.15 K/uL    Lymphs (Absolute) 0.72 (L) 1.00 - 4.80 K/uL    Monos (Absolute) 0.77 0.00 - 0.85 K/uL    Eos (Absolute) 0.07 0.00 - 0.51 K/uL    Baso (Absolute) 0.03 0.00 - 0.12 K/uL    Immature Granulocytes (abs) 0.04 0.00 - 0.11 K/uL    NRBC (Absolute) 0.00 K/uL   Comp Metabolic Panel (CMP)    Collection Time: 09/11/20  5:44 AM   Result Value Ref Range    Sodium 139 135 - 145 mmol/L    Potassium 3.3 (L) 3.6 - 5.5 mmol/L    Chloride 100 96 - 112 mmol/L    Co2 26 20 - 33 mmol/L    Anion Gap 13.0 7.0 - 16.0     Glucose 148 (H) 65 - 99 mg/dL    Bun 54 (H) 8 - 22 mg/dL    Creatinine 1.52 (H) 0.50 - 1.40 mg/dL    Calcium 9.2 8.5 - 10.5 mg/dL    AST(SGOT) 28 12 - 45 U/L    ALT(SGPT) 24 2 - 50 U/L    Alkaline Phosphatase 78 30 - 99 U/L    Total Bilirubin 0.7 0.1 - 1.5 mg/dL    Albumin 3.0 (L) 3.2 - 4.9 g/dL    Total Protein 5.8 (L) 6.0 - 8.2 g/dL    Globulin 2.8 1.9 - 3.5 g/dL    A-G Ratio 1.1 g/dL   proBrain Natriuretic Peptide, NT    Collection Time: 20  5:44 AM   Result Value Ref Range    NT-proBNP 98910 (H) 0 - 125 pg/mL   TSH WITH REFLEX TO FT4    Collection Time: 20  5:44 AM   Result Value Ref Range    TSH 1.190 0.380 - 5.330 uIU/mL   ESTIMATED GFR    Collection Time: 20  5:44 AM   Result Value Ref Range    GFR If  39 (A) >60 mL/min/1.73 m 2    GFR If Non  33 (A) >60 mL/min/1.73 m 2   FOLATE    Collection Time: 20  5:44 AM   Result Value Ref Range    Folate -Folic Acid 35.9 >4.0 ng/mL   VITAMIN B12    Collection Time: 20  5:44 AM   Result Value Ref Range    Vitamin B12 -True Cobalamin 1381 (H) 211 - 911 pg/mL   HEMOGLOBIN A1C    Collection Time: 20  5:44 AM   Result Value Ref Range    Glycohemoglobin 6.4 (H) 0.0 - 5.6 %    Est Avg Glucose 137 mg/dL   ACCU-CHEK GLUCOSE    Collection Time: 20 12:03 PM   Result Value Ref Range    Glucose - Accu-Ck 201 (H) 65 - 99 mg/dL   EKG    Collection Time: 20 12:10 PM   Result Value Ref Range    Report       [facility]    Test Date:  2020  Pt Name:    MORRO PINZON            Department: 171  MRN:        [id number]                      Room:       T716  Gender:     Female                       Technician: ANN  :        [id number]                   Requested By:ISABELLE RODRIGUES  Order #:    [id number]                    Reading MD: Reuben Shepherd MD    Measurements  Intervals                                Axis  Rate:       59                           P:          0  OH:          294                          QRS:        34  QRSD:       112                          T:          116  QT:         468  QTc:        464    Interpretive Statements  SINUS BRADYCARDIA  ATRIAL PREMATURE COMPLEX  FIRST DEGREE AV BLOCK  PROBABLE LEFT ATRIAL ABNORMALITY  NONSPECIFIC INTRAVENTRICULAR CONDUCTION DELAY  LVH WITH SECONDARY REPOLARIZATION ABNORMALITY  Probable ANTERIOR INFARCT, OLD  Electronically Signed On 9- 12:15:41 PDT by Reuben Shepherd MD     ACCU-CHEK GLUCOSE    Collection Time: 09/11/20  5:04 PM   Result Value Ref Range    Glucose - Accu-Ck 103 (H) 65 - 99 mg/dL   EC-ECHOCARDIOGRAM COMPLETE W/O CONT    Collection Time: 09/11/20  8:51 PM   Result Value Ref Range    Eject.Frac. MOD BP 48.73     Eject.Frac. MOD 4C 47.49     Eject.Frac. MOD 2C 51.49     Left Ventrical Ejection Fraction 40    ACCU-CHEK GLUCOSE    Collection Time: 09/11/20  9:07 PM   Result Value Ref Range    Glucose - Accu-Ck 128 (H) 65 - 99 mg/dL       Imaging/Procedures Review:    Chest Xray:  Reviewed    EKG:   Dated 9/10/2020 personally interpreted myself showing normal sinus rhythm with possible anterior infarct age undetermined.    ECHO:  Per HPI    MDM (Assessment and Plan):     Active Hospital Problems    Diagnosis   • Fall [W19.XXXA]     Priority: Medium   • HTN (hypertension) [I10]     Priority: Medium   • Hypoglycemia associated with diabetes ([facility]) [E11.649]     Priority: Medium   • JOSE C (acute kidney injury) ([facility]) [N17.9]     Priority: Medium   • CAD (coronary artery disease) [I25.10]     Priority: Low   • Atrial fibrillation ([facility]) [I48.91]     85-year-old female with an abnormal echocardiogram with an abnormal aortic valve in the setting of a recent fall and a positive troponin with some acute kidney injury.  Given her ECG the history of bypass surgery her echocardiogram findings are more likely consistent with a prior apical infarct with ballooning.  We will continue to follow this expectantly.  Her troponin does  not require further evaluation at this point.  She will require follow-up in the cardiology clinic.  I would not consider evaluation of her aortic valve during this admission.

## 2020-09-12 NOTE — DOCUMENTATION QUERY
Counts include 234 beds at the [facility]                                                                       Query Response Note      PATIENT:               MORRO PINZON  ACCT #:                  [id number]  MRN:                     [id number]  :                      1935  ADMIT DATE:       9/10/2020 2:05 PM  DISCH DATE:          RESPONDING  PROVIDER #:        [id number]           QUERY TEXT:    Congestive Heart Failure is documented in the Medical Record. Please document the type and acuity (includes probable or suspected).     NOTE:  If an appropriate response is not listed below, please respond with a new note.    The patient's Clinical Indicators include:  Clinical indicators-  Per H&P: PMH CHF. Current admission: Mild SOB laying flat, normally sleeps in reclined positions or on her side. Has trace pitting edema of LE and very subtle bibasilar crackles  Home medications: Coreg, Lisinopril, Spironolactone, Lasix  Trop T: 53   BNP: 31614  CXR: Cardiomegaly with mild pulmonary vascular congestion and interstitial edema  Echo ():  L EF 45%  Echo (20): L EF 40%. Basal hyperkinesis with apical hypokinesis. R heart pressures consistent with severe pulmonary HTN    Treatments-  Lisinopril  Trop/BNP  Echo  CXR    Risks-  CHF; LEF 40%; SOB; Edema of LE; A-fib; CAD; HTN; DM; Hypotension; Bradycardia; Syncope    Thank you,  OSCAR Cortez RN  Connect via Tiger Text  Options provided:   -- Acute Systolic heart failure   -- Chronic Systolic heart failure   -- Acute on Chronic Systolic heart failure   -- Unable to determine      Query created by: Vero Purcell on 2020 10:11 AM    RESPONSE TEXT:    Acute on Chronic Systolic heart failure       QUERY TEXT:    Atrial fibrillation is documented in the Medical Record.  Please specify the type.    NOTE:  If an appropriate response is not listed below, please respond with a new note.    The patient's  Clinical Indicators include:  Clinical indicators-  Per H&P: PMH Atrial Fibrillation. Appears relatively controlled PTA. ED evaluation regularly irregular with rate of 70-80. Has been on Eliquis.     Treatments-  Continue home eliquis   Holding coreg for concerns of bradycardia   Tele monitoring   Echo  EKG    Risks-  A-fib; CHF; CAD; HTN; Syncope; Bradycardia; Hypotension    Thank you,  Vero Purcell, CDI RN  Connect via Tiger Text  Options provided:   -- Permanent atrial fibrillation (persistent or longstanding persistent AF where cardioversion cannot or will not be performed   -- Longstanding persistent atrial fibrillation (AF that is persistent and continuous, lasting longer than 1 year)   -- Chronic atrial fibrillation, unspecified (may refer to persistent, longstanding persistent or permanent AF.  A more specific descriptive term is preferred over the nonspecific AF   -- Other persistent atrial fibrillation (AF that does not terminate within 7 days or that requires repeat pharmacological or electrical cardioversion.   -- Paroxysmal atrial fibrillation (self-terminating or intermittent spontaneously or with intervention within 7 days of onset)   -- Unable to determine      Query created by: Vero Purcell on 9/12/2020 10:11 AM    RESPONSE TEXT:    Permanent atrial fibrillation (persistent or longstanding persistent AF where cardioversion cannot or will not be performed          Electronically signed by:  ISABELLE RODRIGUES MD 9/12/2020 12:16 PM

## 2020-09-12 NOTE — CARE PLAN
Problem: Safety  Goal: Will remain free from injury  Outcome: PROGRESSING AS EXPECTED  Note: Pt mobility assessed at beginning of shift. Pt is standby assist. Fall precautions in place. Non-slip socks on. Bed in lowest locked position. Bed alarm on. Call light within reach. Pt educated to call for assistance and verbalizes understanding.      Problem: Knowledge Deficit  Goal: Knowledge of disease process/condition, treatment plan, diagnostic tests, and medications will improve  Outcome: PROGRESSING AS EXPECTED  Note: Pt educated about disease process. Reason why medications are taken. And informed about treatment plan.

## 2020-09-12 NOTE — PROGRESS NOTES
Received Bedside report. Assumed care at 1915. This pt is AOx4 Patient and RN discussed plan of care questions answered. Labs noted, assessment complete,  Tele rhythm and monitor verified. Bed in lowest lock postion, 2 side rails up.Call light in place, fall precautions in place, patient educated on importance of calling for assistance. No additional needs at this time. VSS

## 2020-09-12 NOTE — FACE TO FACE
Face to Face Supporting Documentation - Home Health    The encounter with this patient was in whole or in part the primary reason for home health admission.    Date of encounter:   Patient:                    MRN:                       YOB: 2018  Ana Torres  [id number]  1935     Home health to see patient for:  Physical Therapy evaluation and treatment and Occupational therapy evaluation and treatment    Skilled need for:  Comment: ground level fall; debility    Skilled nursing interventions to include:  Comment: None    Homebound status evidenced by:  Need the aid of supportive devices such as crutches, canes, wheelchairs or walkers or Needs the assistance of another person in order to leave the home. Leaving home requires a considerable and taxing effort. There is a normal inability to leave the home.    Community Physician to provide follow up care: Rich Coffey M.D.     Optional Interventions? No      I certify the face to face encounter for this home health care referral meets the CMS requirements and the encounter/clinical assessment with the patient was, in whole, or in part, for the medical condition(s) listed above, which is the primary reason for home health care. Based on my clinical findings: the service(s) are medically necessary, support the need for home health care, and the homebound criteria are met.  I certify that this patient has had a face to face encounter by myself.  Torin Santiago M.D. - NPI: [id number]

## 2020-09-13 ENCOUNTER — HOME HEALTH ADMISSION (OUTPATIENT)
Dept: HOME HEALTH SERVICES | Facility: HOME HEALTHCARE | Age: 85
End: 2020-09-13
Payer: MEDICARE

## 2020-09-13 PROBLEM — I48.20 CHRONIC ATRIAL FIBRILLATION (HCC): Status: ACTIVE | Noted: 2020-09-10
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
## 2020-09-13 LAB
ANION GAP SERPL CALC-SCNC: 12 MMOL/L (ref 7–16)
BUN SERPL-MCNC: 35 MG/DL (ref 8–22)
CALCIUM SERPL-MCNC: 9.8 MG/DL (ref 8.5–10.5)
CHLORIDE SERPL-SCNC: 101 MMOL/L (ref 96–112)
CO2 SERPL-SCNC: 26 MMOL/L (ref 20–33)
COVID ORDER STATUS COVID19: NORMAL
CREAT SERPL-MCNC: 1.16 MG/DL (ref 0.5–1.4)
CREAT UR-MCNC: 64.9 MG/DL
ERYTHROCYTE [DISTWIDTH] IN BLOOD BY AUTOMATED COUNT: 50.7 FL (ref 35.9–50)
GLUCOSE BLD-MCNC: 118 MG/DL (ref 65–99)
GLUCOSE BLD-MCNC: 149 MG/DL (ref 65–99)
GLUCOSE BLD-MCNC: 157 MG/DL (ref 65–99)
GLUCOSE BLD-MCNC: 288 MG/DL (ref 65–99)
GLUCOSE SERPL-MCNC: 129 MG/DL (ref 65–99)
HCT VFR BLD AUTO: 39.5 % (ref 37–47)
HGB BLD-MCNC: 12.9 G/DL (ref 12–16)
MAGNESIUM SERPL-MCNC: 1.6 MG/DL (ref 1.5–2.5)
MCH RBC QN AUTO: 32.3 PG (ref 27–33)
MCHC RBC AUTO-ENTMCNC: 32.7 G/DL (ref 33.6–35)
MCV RBC AUTO: 99 FL (ref 81.4–97.8)
PHOSPHATE SERPL-MCNC: 2.3 MG/DL (ref 2.5–4.5)
PLATELET # BLD AUTO: 280 K/UL (ref 164–446)
PMV BLD AUTO: 9.7 FL (ref 9–12.9)
POTASSIUM SERPL-SCNC: 4.2 MMOL/L (ref 3.6–5.5)
RBC # BLD AUTO: 3.99 M/UL (ref 4.2–5.4)
SARS-COV-2 RNA RESP QL NAA+PROBE: NOTDETECTED
SODIUM SERPL-SCNC: 139 MMOL/L (ref 135–145)
SPECIMEN SOURCE: NORMAL
WBC # BLD AUTO: 9.4 K/UL (ref 4.8–10.8)

## 2020-09-13 PROCEDURE — 770020 HCHG ROOM/CARE - TELE (206)

## 2020-09-13 PROCEDURE — 83735 ASSAY OF MAGNESIUM: CPT

## 2020-09-13 PROCEDURE — 85027 COMPLETE CBC AUTOMATED: CPT

## 2020-09-13 PROCEDURE — U0003 INFECTIOUS AGENT DETECTION BY NUCLEIC ACID (DNA OR RNA); SEVERE ACUTE RESPIRATORY SYNDROME CORONAVIRUS 2 (SARS-COV-2) (CORONAVIRUS DISEASE [COVID-19]), AMPLIFIED PROBE TECHNIQUE, MAKING USE OF HIGH THROUGHPUT TECHNOLOGIES AS DESCRIBED BY CMS-2020-01-R: HCPCS

## 2020-09-13 PROCEDURE — 700101 HCHG RX REV CODE 250: Performed by: STUDENT IN AN ORGANIZED HEALTH CARE EDUCATION/TRAINING PROGRAM

## 2020-09-13 PROCEDURE — 700105 HCHG RX REV CODE 258: Performed by: STUDENT IN AN ORGANIZED HEALTH CARE EDUCATION/TRAINING PROGRAM

## 2020-09-13 PROCEDURE — 82570 ASSAY OF URINE CREATININE: CPT

## 2020-09-13 PROCEDURE — 36415 COLL VENOUS BLD VENIPUNCTURE: CPT

## 2020-09-13 PROCEDURE — 700102 HCHG RX REV CODE 250 W/ 637 OVERRIDE(OP): Performed by: STUDENT IN AN ORGANIZED HEALTH CARE EDUCATION/TRAINING PROGRAM

## 2020-09-13 PROCEDURE — 82962 GLUCOSE BLOOD TEST: CPT

## 2020-09-13 PROCEDURE — A9270 NON-COVERED ITEM OR SERVICE: HCPCS | Performed by: STUDENT IN AN ORGANIZED HEALTH CARE EDUCATION/TRAINING PROGRAM

## 2020-09-13 PROCEDURE — 80048 BASIC METABOLIC PNL TOTAL CA: CPT

## 2020-09-13 PROCEDURE — 84100 ASSAY OF PHOSPHORUS: CPT

## 2020-09-13 PROCEDURE — 700111 HCHG RX REV CODE 636 W/ 250 OVERRIDE (IP): Performed by: STUDENT IN AN ORGANIZED HEALTH CARE EDUCATION/TRAINING PROGRAM

## 2020-09-13 PROCEDURE — 99232 SBSQ HOSP IP/OBS MODERATE 35: CPT | Mod: GC | Performed by: INTERNAL MEDICINE

## 2020-09-13 RX ORDER — MAGNESIUM SULFATE HEPTAHYDRATE 40 MG/ML
2 INJECTION, SOLUTION INTRAVENOUS ONCE
Status: COMPLETED | OUTPATIENT
Start: 2020-09-13 | End: 2020-09-13

## 2020-09-13 RX ADMIN — INSULIN HUMAN 1 UNITS: 100 INJECTION, SOLUTION PARENTERAL at 12:14

## 2020-09-13 RX ADMIN — SODIUM PHOSPHATE, MONOBASIC, MONOHYDRATE 30 MMOL: 276; 142 INJECTION, SOLUTION INTRAVENOUS at 17:23

## 2020-09-13 RX ADMIN — APIXABAN 2.5 MG: 2.5 TABLET, FILM COATED ORAL at 17:30

## 2020-09-13 RX ADMIN — MAGNESIUM SULFATE 2 G: 2 INJECTION INTRAVENOUS at 15:02

## 2020-09-13 RX ADMIN — APIXABAN 2.5 MG: 2.5 TABLET, FILM COATED ORAL at 06:01

## 2020-09-13 RX ADMIN — DOCUSATE SODIUM 50 MG AND SENNOSIDES 8.6 MG 2 TABLET: 8.6; 5 TABLET, FILM COATED ORAL at 06:01

## 2020-09-13 RX ADMIN — INSULIN HUMAN 3 UNITS: 100 INJECTION, SOLUTION PARENTERAL at 20:21

## 2020-09-13 RX ADMIN — LISINOPRIL 10 MG: 10 TABLET ORAL at 06:01

## 2020-09-13 RX ADMIN — CARVEDILOL 6.25 MG: 6.25 TABLET, FILM COATED ORAL at 08:19

## 2020-09-13 RX ADMIN — CARVEDILOL 6.25 MG: 6.25 TABLET, FILM COATED ORAL at 17:30

## 2020-09-13 RX ADMIN — ATORVASTATIN CALCIUM 80 MG: 80 TABLET, FILM COATED ORAL at 17:30

## 2020-09-13 ASSESSMENT — ENCOUNTER SYMPTOMS
NAUSEA: 0
DOUBLE VISION: 0
CHILLS: 0
HEARTBURN: 0
VOMITING: 0
PHOTOPHOBIA: 0
ABDOMINAL PAIN: 0
HEADACHES: 0
DIZZINESS: 0
DIARRHEA: 0
WEAKNESS: 0
PALPITATIONS: 0
SHORTNESS OF BREATH: 0
COUGH: 0
BACK PAIN: 0
CONSTIPATION: 0
BLURRED VISION: 0
FOCAL WEAKNESS: 0
DEPRESSION: 0
MYALGIAS: 0
WHEEZING: 0
FEVER: 0

## 2020-09-13 ASSESSMENT — FIBROSIS 4 INDEX: FIB4 SCORE: 1.69

## 2020-09-13 NOTE — DISCHARGE PLANNING
This referral has been escalated to a Clinical Supervisor for review in order to determine Home Health appropriateness.

## 2020-09-13 NOTE — PROGRESS NOTES
Daily Progress Note:     Date of Service: 9/13/2020  Primary Team: [employer] Blue Team   Attending: Natividad Wiggins MD  Senior Resident: Dr. Russell  Intern: Dr. Santiago  Contact:  [phone]    Chief Complaint:    Syncope    Subjective:   No acute events overnight. Patient is well this AM and denies having any nausea, vomiting, abdominal pain, dizziness, HA, or vision changes. Awaiting approval for home health.    Consultants/Specialty:  None    Review of Systems:    Review of Systems   Constitutional: Negative for chills, fever and malaise/fatigue.   Eyes: Negative for blurred vision, double vision and photophobia.   Respiratory: Negative for cough, shortness of breath and wheezing.    Cardiovascular: Negative for chest pain, palpitations and leg swelling.   Gastrointestinal: Negative for abdominal pain, constipation, diarrhea, heartburn, nausea and vomiting.   Genitourinary: Negative for dysuria, frequency and urgency.   Musculoskeletal: Negative for back pain and myalgias.   Skin: Negative for itching and rash.   Neurological: Negative for dizziness, focal weakness, weakness and headaches.   Psychiatric/Behavioral: Negative for depression and suicidal ideas.       Objective Data:   Physical Exam:   Vitals:   Temp:  [36.3 °C (97.3 °F)-36.7 °C (98.1 °F)] 36.3 °C (97.3 °F)  Pulse:  [61-70] 63  Resp:  [16-18] 16  BP: (110-151)/(60-83) 129/78  SpO2:  [90 %-96 %] 90 %    Physical Exam  Constitutional:       General: She is not in acute distress.     Appearance: Normal appearance. She is normal weight. She is not ill-appearing.   HENT:      Head: Normocephalic and atraumatic.      Nose: Nose normal.      Mouth/Throat:      Mouth: Mucous membranes are dry.      Pharynx: Oropharynx is clear.   Eyes:      General: No scleral icterus.     Extraocular Movements: Extraocular movements intact.      Conjunctiva/sclera: Conjunctivae normal.      Pupils: Pupils are equal, round, and reactive to light.   Neck:       Musculoskeletal: Normal range of motion and neck supple.   Cardiovascular:      Rate and Rhythm: Normal rate and regular rhythm.      Pulses: Normal pulses.      Heart sounds: Normal heart sounds. No murmur. No gallop.    Pulmonary:      Effort: Pulmonary effort is normal.      Breath sounds: Normal breath sounds. No wheezing, rhonchi or rales.   Abdominal:      General: Abdomen is flat. Bowel sounds are normal. There is no distension.      Palpations: Abdomen is soft.      Tenderness: There is no abdominal tenderness. There is no right CVA tenderness, left CVA tenderness or guarding.   Skin:     General: Skin is warm and dry.   Neurological:      General: No focal deficit present.      Mental Status: She is alert and oriented to person, place, and time. Mental status is at baseline.      Cranial Nerves: No cranial nerve deficit.      Sensory: No sensory deficit.   Psychiatric:         Mood and Affect: Mood normal.         Behavior: Behavior normal.         Thought Content: Thought content normal.         Judgment: Judgment normal.       Labs:   Recent Results (from the past 24 hour(s))   ACCU-CHEK GLUCOSE    Collection Time: 09/12/20  5:11 PM   Result Value Ref Range    Glucose - Accu-Ck 150 (H) 65 - 99 mg/dL   ACCU-CHEK GLUCOSE    Collection Time: 09/12/20  8:45 PM   Result Value Ref Range    Glucose - Accu-Ck 197 (H) 65 - 99 mg/dL   Basic Metabolic Panel    Collection Time: 09/13/20  6:52 AM   Result Value Ref Range    Sodium 139 135 - 145 mmol/L    Potassium 4.2 3.6 - 5.5 mmol/L    Chloride 101 96 - 112 mmol/L    Co2 26 20 - 33 mmol/L    Glucose 129 (H) 65 - 99 mg/dL    Bun 35 (H) 8 - 22 mg/dL    Creatinine 1.16 0.50 - 1.40 mg/dL    Calcium 9.8 8.5 - 10.5 mg/dL    Anion Gap 12.0 7.0 - 16.0   CBC WITHOUT DIFFERENTIAL    Collection Time: 09/13/20  6:52 AM   Result Value Ref Range    WBC 9.4 4.8 - 10.8 K/uL    RBC 3.99 (L) 4.20 - 5.40 M/uL    Hemoglobin 12.9 12.0 - 16.0 g/dL    Hematocrit 39.5 37.0 - 47.0 %    MCV  99.0 (H) 81.4 - 97.8 fL    MCH 32.3 27.0 - 33.0 pg    MCHC 32.7 (L) 33.6 - 35.0 g/dL    RDW 50.7 (H) 35.9 - 50.0 fL    Platelet Count 280 164 - 446 K/uL    MPV 9.7 9.0 - 12.9 fL   MAGNESIUM    Collection Time: 09/13/20  6:52 AM   Result Value Ref Range    Magnesium 1.6 1.5 - 2.5 mg/dL   PHOSPHORUS    Collection Time: 09/13/20  6:52 AM   Result Value Ref Range    Phosphorus 2.3 (L) 2.5 - 4.5 mg/dL   ESTIMATED GFR    Collection Time: 09/13/20  6:52 AM   Result Value Ref Range    GFR If  54 (A) >60 mL/min/1.73 m 2    GFR If Non African American 45 (A) >60 mL/min/1.73 m 2   URIC ACID URINE    Collection Time: 09/13/20  6:57 AM   Result Value Ref Range    Collection Length Random Hrs    Total Volume Random mL   URINE CREATININE RANDOM    Collection Time: 09/13/20  6:57 AM   Result Value Ref Range    Creatinine, Random Urine 64.90 mg/dL   ACCU-CHEK GLUCOSE    Collection Time: 09/13/20  8:17 AM   Result Value Ref Range    Glucose - Accu-Ck 118 (H) 65 - 99 mg/dL   COVID/SARS CoV-2 PCR    Collection Time: 09/13/20  9:06 AM   Result Value Ref Range    COVID Order Status Received    SARS-CoV-2, PCR (In-House)    Collection Time: 09/13/20  9:06 AM   Result Value Ref Range    SARS-CoV-2 Source NP Swab    ACCU-CHEK GLUCOSE    Collection Time: 09/13/20 12:13 PM   Result Value Ref Range    Glucose - Accu-Ck 157 (H) 65 - 99 mg/dL       Imaging:   Independant Imaging Review: Completed  EC-ECHOCARDIOGRAM COMPLETE W/O CONT   Final Result      IR-US GUIDED PIV   Final Result    Ultrasound-guided PERIPHERAL IV INSERTION performed by    qualified nursing staff as above.      CT-HEAD W/O   Final Result         1. No acute intracranial abnormality. No evidence of acute intracranial hemorrhage or mass lesion.               DX-CHEST-PORTABLE (1 VIEW)   Final Result      Cardiomegaly with mild pulmonary vascular congestion and interstitial edema.      Atherosclerotic plaque.      Stable elevation of the right hemidiaphragm.              Problem Representation:    CHF (congestive heart failure) ([facility])  Assessment & Plan  History of CHF her last ECHO was in spring of 2019, which showed EF of 45%. She has trace pitting edema of the LE and very subtle bibasilar crackles. She takes lasix and spironolactone at home and reports doing so regularly. Troponin elevated at 53 [place]NP at [address]. Echo demonstrates LVEF 40%, RV systolic pressure 100 mmHg, and basal hyperkinesis with apical hypokinesis, and severe aortic stenosis.  -lisinopril 10mg PO qD  -carvedilol 6.25mg BID  -Hold spironolactone/lasix  -follow up cardiology as outpatient    HTN (hypertension)- (present on admission)  Assessment & Plan  Has history of hypertension on home lisinopril, carvedilol, furosemide, and spironolactone  -continue dose reduced lisinopril 10 mg qd  -dose reduce carvedilol 6.25 mg BID  -continue to hold furosemide, spironolactone    Hypoglycemia associated with diabetes (HCC)- (present on admission)  Assessment & Plan  Hypoglycemia during symptoms, corrected after D50 by EMS. At home, uses 10 units glargine.  -continue to monitor  -SSI  -BG protocols  -given normal blood glucose here, and minimal insulin given, may not need insulin as outpatient    JOSE C (acute kidney injury) ([facility])  Assessment & Plan  -Improving  -continue to encourage oral rehydration    Chronic atrial fibrillation ([facility])  Assessment & Plan  History of atrial fibrillation, could be a component of her heart failure. At ED evaluation, was regularly irregular with rate of 70-80. On eliquis for Afib and CHF.  -Continue home eliquis  -dose reduce carvedilol 6.25mg BID  -Tele monitoring    CAD (coronary artery disease)- (present on admission)  Assessment & Plan  History of CAD with CABG done in 2004.  -continue Atorvastatin, carvedilol, lisinopril    Torin Santiago MD  PGY1 Internal Medicine

## 2020-09-13 NOTE — DISCHARGE PLANNING
ATTN: Case Management  RE: Referral for Home Health    As of 9/13/20, we have accepted the Home Health referral for the patient listed above.    A [facility] clinician will be out to see the patient within 48 hours. If you have any questions or concerns regarding the patient's transition to Home Health, please do not hesitate to contact us at [phone].      We look forward to collaborating with you,  [facility] Home Health Team

## 2020-09-13 NOTE — DISCHARGE PLANNING
Received Choice form at 0857  Agency/Facility Name: [facility]  Referral sent per Choice form @ [phone]

## 2020-09-13 NOTE — DISCHARGE PLANNING
Anticipated Discharge Disposition: home with home heatlh    Action: Spoke with pt and obtained home health choice: [facility]. Faxed choice form to [facility] at [phone].     Barriers to Discharge: HH acceptance    Plan: follow up with [facility]

## 2020-09-14 VITALS
BODY MASS INDEX: 22.44 KG/M2 | TEMPERATURE: 97.5 F | HEART RATE: 69 BPM | OXYGEN SATURATION: 92 % | DIASTOLIC BLOOD PRESSURE: 94 MMHG | RESPIRATION RATE: 18 BRPM | SYSTOLIC BLOOD PRESSURE: 148 MMHG | WEIGHT: 111.33 LBS | HEIGHT: 59 IN

## 2020-09-14 LAB
ANION GAP SERPL CALC-SCNC: 11 MMOL/L (ref 7–16)
BUN SERPL-MCNC: 28 MG/DL (ref 8–22)
CALCIUM SERPL-MCNC: 9 MG/DL (ref 8.5–10.5)
CHLORIDE SERPL-SCNC: 101 MMOL/L (ref 96–112)
CO2 SERPL-SCNC: 24 MMOL/L (ref 20–33)
CREAT SERPL-MCNC: 1.11 MG/DL (ref 0.5–1.4)
ERYTHROCYTE [DISTWIDTH] IN BLOOD BY AUTOMATED COUNT: 49.8 FL (ref 35.9–50)
GLUCOSE BLD-MCNC: 121 MG/DL (ref 65–99)
GLUCOSE BLD-MCNC: 166 MG/DL (ref 65–99)
GLUCOSE SERPL-MCNC: 144 MG/DL (ref 65–99)
HCT VFR BLD AUTO: 38 % (ref 37–47)
HGB BLD-MCNC: 12.5 G/DL (ref 12–16)
MAGNESIUM SERPL-MCNC: 2 MG/DL (ref 1.5–2.5)
MCH RBC QN AUTO: 32.1 PG (ref 27–33)
MCHC RBC AUTO-ENTMCNC: 32.9 G/DL (ref 33.6–35)
MCV RBC AUTO: 97.7 FL (ref 81.4–97.8)
PHOSPHATE SERPL-MCNC: 4.3 MG/DL (ref 2.5–4.5)
PLATELET # BLD AUTO: 272 K/UL (ref 164–446)
PMV BLD AUTO: 9.8 FL (ref 9–12.9)
POTASSIUM SERPL-SCNC: 4.1 MMOL/L (ref 3.6–5.5)
RBC # BLD AUTO: 3.89 M/UL (ref 4.2–5.4)
SODIUM SERPL-SCNC: 136 MMOL/L (ref 135–145)
WBC # BLD AUTO: 9.7 K/UL (ref 4.8–10.8)

## 2020-09-14 PROCEDURE — 82962 GLUCOSE BLOOD TEST: CPT | Mod: 91

## 2020-09-14 PROCEDURE — 80048 BASIC METABOLIC PNL TOTAL CA: CPT

## 2020-09-14 PROCEDURE — 97530 THERAPEUTIC ACTIVITIES: CPT

## 2020-09-14 PROCEDURE — A9270 NON-COVERED ITEM OR SERVICE: HCPCS | Performed by: STUDENT IN AN ORGANIZED HEALTH CARE EDUCATION/TRAINING PROGRAM

## 2020-09-14 PROCEDURE — 84100 ASSAY OF PHOSPHORUS: CPT

## 2020-09-14 PROCEDURE — 85027 COMPLETE CBC AUTOMATED: CPT

## 2020-09-14 PROCEDURE — 700102 HCHG RX REV CODE 250 W/ 637 OVERRIDE(OP): Performed by: STUDENT IN AN ORGANIZED HEALTH CARE EDUCATION/TRAINING PROGRAM

## 2020-09-14 PROCEDURE — 99239 HOSP IP/OBS DSCHRG MGMT >30: CPT | Mod: GC | Performed by: INTERNAL MEDICINE

## 2020-09-14 PROCEDURE — 83735 ASSAY OF MAGNESIUM: CPT

## 2020-09-14 PROCEDURE — 97535 SELF CARE MNGMENT TRAINING: CPT

## 2020-09-14 PROCEDURE — 36415 COLL VENOUS BLD VENIPUNCTURE: CPT

## 2020-09-14 RX ORDER — CARVEDILOL 6.25 MG/1
6.25 TABLET ORAL 2 TIMES DAILY WITH MEALS
Qty: 60 TAB | Refills: 0 | Status: SHIPPED | OUTPATIENT
Start: 2020-09-14 | End: 2020-10-06

## 2020-09-14 RX ORDER — SPIRONOLACTONE 25 MG/1
25 TABLET ORAL
Qty: 30 TAB | Refills: 1 | Status: SHIPPED | OUTPATIENT
Start: 2020-09-14 | End: 2020-10-02 | Stop reason: SDUPTHER

## 2020-09-14 RX ORDER — FUROSEMIDE 20 MG/1
20 TABLET ORAL
Qty: 30 TAB | Refills: 0 | Status: SHIPPED | OUTPATIENT
Start: 2020-09-14 | End: 2020-10-02 | Stop reason: SDUPTHER

## 2020-09-14 RX ORDER — LISINOPRIL 10 MG/1
10 TABLET ORAL DAILY
Qty: 30 TAB | Refills: 0 | Status: SHIPPED | OUTPATIENT
Start: 2020-09-15 | End: 2020-10-06

## 2020-09-14 RX ORDER — ATORVASTATIN CALCIUM 80 MG/1
80 TABLET, FILM COATED ORAL EVERY EVENING
Qty: 30 TAB | Refills: 0 | Status: SHIPPED | OUTPATIENT
Start: 2020-09-14 | End: 2021-01-06 | Stop reason: SDUPTHER

## 2020-09-14 RX ADMIN — APIXABAN 2.5 MG: 2.5 TABLET, FILM COATED ORAL at 05:09

## 2020-09-14 RX ADMIN — LISINOPRIL 10 MG: 10 TABLET ORAL at 05:08

## 2020-09-14 RX ADMIN — CARVEDILOL 6.25 MG: 6.25 TABLET, FILM COATED ORAL at 08:47

## 2020-09-14 RX ADMIN — INSULIN HUMAN 1 UNITS: 100 INJECTION, SOLUTION PARENTERAL at 08:34

## 2020-09-14 ASSESSMENT — ENCOUNTER SYMPTOMS
BACK PAIN: 0
COUGH: 0
DIZZINESS: 0
FEVER: 0
ABDOMINAL PAIN: 0
BLURRED VISION: 0
CHILLS: 0
NAUSEA: 0
DIARRHEA: 0
VOMITING: 0
WEAKNESS: 0
DEPRESSION: 0
MYALGIAS: 0
HEARTBURN: 0
SHORTNESS OF BREATH: 0
WHEEZING: 0
DOUBLE VISION: 0
CONSTIPATION: 0
FOCAL WEAKNESS: 0
HEADACHES: 0
PALPITATIONS: 0
PHOTOPHOBIA: 0

## 2020-09-14 ASSESSMENT — COGNITIVE AND FUNCTIONAL STATUS - GENERAL
TOILETING: A LITTLE
DAILY ACTIVITIY SCORE: 21
DRESSING REGULAR LOWER BODY CLOTHING: A LITTLE
HELP NEEDED FOR BATHING: A LITTLE
SUGGESTED CMS G CODE MODIFIER DAILY ACTIVITY: CJ

## 2020-09-14 NOTE — THERAPY
"Occupational Therapy  Daily Treatment     Patient Name: Ana Torres  Age:  85 y.o., Sex:  female  Medical Record #: [id number]  Today's Date: 9/14/2020     Precautions: Fall Risk    Assessment    Pt seen for OT tx to include: bed mobility, transfer training, FB dressing in personal clothes. Pt required bed features to mobilize from supine > EOB. Reports she pulls on nightstand drawer at home for leverage. Pt required min A to pull pants fully past posterior hips. Pt will likely DC home today with HH. Reiterated recommendation for shower chair use at home. Pt reports she feels grab bars are adequate. Pt will continue to benefit from acute OT to maximize functional independence if remains in-house.     Plan    Continue current treatment plan.    DC Equipment Recommendations: Tub / Shower Seat  Discharge Recommendations: Recommend home health for continued occupational therapy services    Subjective    \"I'm going home today no matter what.\"     Objective     09/14/20 1227   Bed Mobility    Supine to Sit Supervised  (required HOB elevated and use of chair arm )   Scooting Supervised  (seated)   Activities of Daily Living   Upper Body Dressing Supervision  (don personal t-shirt, long-sleeve shirt )   Lower Body Dressing Minimal Assist  (don underpants, sweat pants, slippers )   Functional Mobility   Sit to Stand Supervised   Bed, Chair, Wheelchair Transfer Supervised   Transfer Method Stand Pivot  (FWW)   Short Term Goals   Short Term Goal # 1 Pt will complete ADL transfers with supv    Goal Outcome # 1 Goal met, new goal added   Short Term Goal # 1 B  Pt will complete seated shower with supv    Goal Outcome  # 1 B Goal not met   Short Term Goal # 2 Pt will doff/don adult brief with supv    Goal Outcome # 2 Progressing as expected         "

## 2020-09-14 NOTE — DISCHARGE SUMMARY
Discharge Summary    Date of Admission: 9/10/2020  Date of Discharge: 09/14/2020  Discharging Attending: Dr. Bangura  Discharging Senior Resident: Dr. Welch  Discharging Intern: Dr. Santiago     CHIEF COMPLAINT ON ADMISSION  Chief Complaint   Patient presents with   • ALOC       Reason for Admission  Syncope due to hypoglycemia and bradycardia   Syncope    Secondary problems:  Diabetes Mellitus type II  Heart failure with reduced ejection fraction   Atrial fibrillation  Coronary artery disease      Admission Date  9/10/2020    CODE STATUS  Full Code    HPI & HOSPITAL COURSE  Ms. Torres is an 85 year old female with above mentioned past medial history is admitted on 09/10/20 with syncope secondary to hypoglycemia and bradycardia. She received dextrose and atropine on admission. Labs on admission were in normal limits and CT head did not show any bleeding.     During the course, she was started on intravenous fluids. Her carvedilol dose is decreased to prevent further bradycardia. She remained in sinus rhythm throughout the stay. Echocardiogram done showed moderate to severe aortic stenosis with EF of 40%. Cardiology recommended outpatient follow up for aortic stenosis. Despite the echocardiogram findings of low EF, she remained euvolemic.  Her home insulin is discontinued as her repeat HbA1c is 6.4 and hypoglycemia episodes. She improved gradually and physical therapy recommended home health services which were set up on discharge.        Therefore, she is discharged in good and stable condition to home with close outpatient follow-up.    The patient met 2-midnight criteria for an inpatient stay at the time of discharge.    PHYSICAL EXAM ON DISCHARGE  Temp:  [36 °C (96.8 °F)-36.5 °C (97.7 °F)] 36.4 °C (97.5 °F)  Pulse:  [63-69] 69  Resp:  [16-18] 18  BP: (147-163)/(68-94) 148/94  SpO2:  [92 %-94 %] 92 %    Physical Exam  Constitutional:       Appearance: Normal appearance.   HENT:      Head: Normocephalic and  atraumatic.      Right Ear: Tympanic membrane normal.      Left Ear: Tympanic membrane normal.      Nose: Nose normal.      Mouth/Throat:      Mouth: Mucous membranes are moist.   Eyes:      Extraocular Movements: Extraocular movements intact.      Pupils: Pupils are equal, round, and reactive to light.   Neck:      Musculoskeletal: Normal range of motion.   Cardiovascular:      Rate and Rhythm: Normal rate and regular rhythm.      Pulses: Normal pulses.      Heart sounds: Normal heart sounds.   Pulmonary:      Effort: Pulmonary effort is normal.      Breath sounds: Normal breath sounds.   Abdominal:      General: Bowel sounds are normal. There is no distension.      Palpations: Abdomen is soft.      Tenderness: There is no abdominal tenderness.   Musculoskeletal: Normal range of motion.   Skin:     General: Skin is warm.   Neurological:      General: No focal deficit present.      Mental Status: She is alert. Mental status is at baseline.   Psychiatric:         Mood and Affect: Mood normal.         Behavior: Behavior normal.         Discharge Date  09/14/2020    FOLLOW UP ITEMS POST DISCHARGE  Follow up with PCP for regular care and medications if required for diabetes( insulin is discontinued on discharge)  Follow up with cardiology for aortic stenosis and heart failure    DISCHARGE DIAGNOSES  Active Problems:    JOSE C (acute kidney injury) (HCC) POA: Unknown    Hypoglycemia associated with diabetes (HCC) POA: Yes    HTN (hypertension) POA: Yes    Chronic atrial fibrillation (HCC) POA: Unknown    CAD (coronary artery disease) POA: Yes  Resolved Problems:    Fall POA: Unknown      FOLLOW UP  Future Appointments   Date Time Provider Department Center   9/18/2020 11:40 AM Rich Coffey M.D. [address]   9/23/2020  1:15 PM Dylan Ruiz M.D. [facility] None     Rich Coffey M.D.  [address]  [facility] [address]  [facility] [address]  [phone]    Schedule an appointment as soon as possible for a  visit in 1 week      Dylan Ruiz M.D.  [address]   [address]  [facility] [address]  [phone]    Schedule an appointment as soon as possible for a visit in 1 week  As needed      MEDICATIONS ON DISCHARGE     Medication List      CHANGE how you take these medications      Instructions   apixaban 2.5mg Tabs  What changed:   · medication strength  · how much to take  Commonly known as: ELIQUIS   Take 1 Tab by mouth 2 Times a Day.  Dose: 2.5 mg     atorvastatin 80 MG tablet  What changed: when to take this  Commonly known as: LIPITOR   Take 1 Tab by mouth every evening.  Dose: 80 mg     carvedilol 6.25 MG Tabs  What changed:   · medication strength  · how much to take  · when to take this  Commonly known as: COREG   Take 1 Tab by mouth 2 times a day, with meals.  Dose: 6.25 mg     furosemide 20 MG Tabs  What changed:   · medication strength  · how much to take  · when to take this  · reasons to take this  Commonly known as: LASIX   Take 1 Tab by mouth 1 time daily as needed.  Dose: 20 mg     lisinopril 10 MG Tabs  Start taking on: September 15, 2020  What changed:   · medication strength  · how much to take  · when to take this  Commonly known as: PRINIVIL   Take 1 Tab by mouth every day.  Dose: 10 mg     spironolactone 25 MG Tabs  What changed:   · when to take this  · reasons to take this  Commonly known as: ALDACTONE   Take 1 Tab by mouth 1 time daily as needed.  Dose: 25 mg        CONTINUE taking these medications      Instructions   MULTI COMPLETE PO   Take  by mouth.        STOP taking these medications    insulin glargine 100 UNIT/ML Soln  Commonly known as: Lantus            Allergies  Allergies   Allergen Reactions   • Tetracaine Hcl [Tetcaine]    • Vancomycin    • Vigamox [Moxifloxacin]        DIET  Orders Placed This Encounter   Procedures   • Diet Order Diabetic (after doing bedside swallow evaluation)     Standing Status:   Standing     Number of Occurrences:   1     Order Specific Question:    Diet:     Answer:   Diabetic [3]     Comments:   after doing bedside swallow evaluation       ACTIVITY  As tolerated.  Weight bearing as tolerated    CONSULTATIONS  Dr. Ruiz with Cardiology consulted.  Treatment options were discussed and plan of care agreed upon.    PROCEDURES  None     Time spent on discharge: 35 minutes

## 2020-09-14 NOTE — CARE PLAN
Problem: Communication  Goal: The ability to communicate needs accurately and effectively will improve  Outcome: PROGRESSING AS EXPECTED   Patient educated to utilize call light. Patient and family oriented to hospital room. Patient encouraged to ask questions about plan of care. Patient effectively uses call light and is involved in POC.       Problem: Discharge Barriers/Planning  Goal: Patient's continuum of care needs will be met  Outcome: PROGRESSING AS EXPECTED   Patient discharge needs are assessed to identify potential barriers. Patient encouraged to participate in patient goals and discharge. Proper interdisciplinary teams collaborated with. Patient actively involved in care.

## 2020-09-14 NOTE — PROGRESS NOTES
9/14. CAMERON Vasquez made an attempt to meet with pt bedside to introduce CCM services. RN attending pt. CHW will come back at a different time.

## 2020-09-14 NOTE — PROGRESS NOTES
Received Bedside report. Assumed care at 0700. This pt is AOx4, ambulatory with x1 assist and FWW, voiding via purewick, has chronic leg pain. Patient and RN discussed plan of care: questions answered. Labs noted, assessment complete, patient tolerating diabetic diet. Tele box in place. Pt is on RA. Call light in place, fall precautions in place, patient educated on importance of calling for assistance. No additional needs at this time. VSS

## 2020-09-14 NOTE — DISCHARGE INSTRUCTIONS
Discharge Instructions    Discharged to home by car with relative. Discharged via wheelchair, hospital escort: Yes.  Special equipment needed: Not Applicable    Be sure to schedule a follow-up appointment with your primary care doctor or any specialists as instructed.     Discharge Plan:        I understand that a diet low in cholesterol, fat, and sodium is recommended for good health. Unless I have been given specific instructions below for another diet, I accept this instruction as my diet prescription.       Special Instructions:   HF Patient Discharge Instructions  · Monitor your weight daily, and maintain a weight chart, to track your weight changes.   · Activity as tolerated, unless your Doctor has ordered otherwise.   · Follow a low fat, low cholesterol, low salt diet unless instructed otherwise by your Doctor. Read the labels on the back of food products and track your intake of fat, cholesterol and salt.   · Fluid Restriction No. If a Fluid Restriction has been ordered by your Doctor, measure fluids with a measuring cup to ensure that you are not exceeding the restriction.   · No smoking.  · Oxygen No. If your Doctor has ordered that you wear Oxygen at home, it is important to wear it as ordered.  · Did you receive an explanation from staff on the importance of taking each of your medications and why it is necessary to keep taking them unless your doctor says to stop? Yes  · Were all of your questions answered about how to manage your heart failure and what to do if you have increased signs and symptoms after you go home? Yes  · Do you feel like your heart failure care team involved you in the care treatment plan and allowed you to make decisions regarding your care while in the hospital and addressed any discharge needs you might have? Yes    See the educational handout provided at discharge for more information on monitoring your daily weight, activity and diet. This also explains more about Heart  Failure, symptoms of a flare-up and some of the tests that you have undergone.     Warning Signs of a Flare-Up include:  · Swelling in the ankles or lower legs.  · Shortness of breath, while at rest, or while doing normal activities.   · Shortness of breath at night when in bed, or coughing in bed.   · Requiring more pillows to sleep at night, or needing to sit up at night to sleep.  · Feeling weak, dizzy or fatigued.     When to call your Doctor:  · Call [facility] seven days a week from 8:00 a.m. to 8:00 p.m. for medical questions [phone].  · Call your Primary Care Physician or Cardiologist if:   1. You experience any pain radiating to your jaw or neck.  2. You have any difficulty breathing.  3. You experience weight gain of 3 lbs in a day or 5 lbs in a week.   4. You feel any palpitations or irregular heartbeats.  5. You become dizzy or lose consciousness.   If you have had an angiogram or had a pacemaker or AICD placed, and experience:  1. Bleeding, drainage or swelling at the surgical / puncture site.  2. Fever greater than 100.0 F  3. Shock from internal defibrillator.  4. Cool and / or numb extremities.      · Is patient discharged on Warfarin / Coumadin?   No     Depression / Suicide Risk    As you are discharged from this [facility], it is important to learn how to keep safe from harming yourself.    Recognize the warning signs:  · Abrupt changes in personality, positive or negative- including increase in energy   · Giving away possessions  · Change in eating patterns- significant weight changes-  positive or negative  · Change in sleeping patterns- unable to sleep or sleeping all the time   · Unwillingness or inability to communicate  · Depression  · Unusual sadness, discouragement and loneliness  · Talk of wanting to die  · Neglect of personal appearance   · Rebelliousness- reckless behavior  · Withdrawal from people/activities they love  · Confusion- inability to  concentrate     If you or a loved one observes any of these behaviors or has concerns about self-harm, here's what you can do:  · Talk about it- your feelings and reasons for harming yourself  · Remove any means that you might use to hurt yourself (examples: pills, rope, extension cords, firearm)  · Get professional help from the community (Mental Health, Substance Abuse, psychological counseling)  · Do not be alone:Call your Safe Contact- someone whom you trust who will be there for you.  · Call your local CRISIS HOTLINE 726-3405 or 968-031-9580  · Call your local Children's Mobile Crisis Response Team [place] (545) 523-2476 or www.[employer] [place]  · Call the toll free National Suicide Prevention Hotlines   · National Suicide Prevention Lifeline 108-472-MSMZ (6941)  · [employer] [facility] Network 800-SUICIDE (433-3637)      Syncope    Syncope refers to a condition in which a person temporarily loses consciousness. Syncope may also be called fainting or passing out. It is caused by a sudden decrease in blood flow to the brain. Even though most causes of syncope are not dangerous, syncope can be a sign of a serious medical problem. Your health care provider may do tests to find the reason why you are having syncope.  Signs that you may be about to faint include:  Feeling dizzy or light-headed.  Feeling nauseous.  Seeing all white or all black in your field of vision.  Having cold, clammy skin.  If you faint, get medical help right away. Call your local emergency services (911 in the [place] Do not drive yourself to the hospital.  Follow these instructions at home:  Pay attention to any changes in your symptoms. Take these actions to stay safe and to help relieve your symptoms:  Lifestyle  Do not drive, use machinery, or play sports until your health care provider says it is okay.  Do not drink alcohol.  Do not use any products that contain nicotine or tobacco, such as cigarettes and e-cigarettes. If you need help  quitting, ask your health care provider.  Drink enough fluid to keep your urine pale yellow.  General instructions  Take over-the-counter and prescription medicines only as told by your health care provider.  If you are taking blood pressure or heart medicine, get up slowly and take several minutes to sit and then stand. This can reduce dizziness or light-headedness.  Have someone stay with you until you feel stable.  If you start to feel like you might faint, lie down right away and raise (elevate) your feet above the level of your heart. Breathe deeply and steadily. Wait until all the symptoms have passed.  Keep all follow-up visits as told by your health care provider. This is important.  Get help right away if you:  Have a severe headache.  Faint once or repeatedly.  Have pain in your chest, abdomen, or back.  Have a very fast or irregular heartbeat (palpitations).  Have pain when you breathe.  Are bleeding from your mouth or rectum, or you have black or tarry stool.  Have a seizure.  Are confused.  Have trouble walking.  Have severe weakness.  Have vision problems.  These symptoms may represent a serious problem that is an emergency. Do not wait to see if your symptoms will go away. Get medical help right away. Call your local emergency services (911 in the [place] Do not drive yourself to the hospital.  Summary  Syncope refers to a condition in which a person temporarily loses consciousness. It is caused by a sudden decrease in blood flow to the brain.  Signs that you may be about to faint include dizziness, feeling light-headed, feeling nauseous, sudden vision changes, or cold, clammy skin.  Although most causes of syncope are not dangerous, syncope can be a sign of a serious medical problem. If you faint, get medical help right away.  This information is not intended to replace advice given to you by your health care provider. Make sure you discuss any questions you have with your health care  provider.  Document Released: 12/18/2006 Document Revised: 11/30/2018 Document Reviewed: 11/26/2018  Elsevier Patient Education © 2020 Elsevier Inc.

## 2020-09-14 NOTE — PROGRESS NOTES
Daily Progress Note:     Date of Service: 9/14/2020  Primary Team: [employer] [facility] Blue Team   Attending: Jb Bangura MD  Senior Resident: Dr. Russell  Intern: Dr. Santiago  Contact:  [phone]    Chief Complaint:    Syncope    Subjective:   No acute events overnight. Patient is well this AM and is ready to go home. Denies having any cp, sob, nausea, vomiting, diarrhea, or constipation. Home health set up, discharge to home today.    Consultants/Specialty:  None    Review of Systems:    Review of Systems   Constitutional: Negative for chills, fever and malaise/fatigue.   Eyes: Negative for blurred vision, double vision and photophobia.   Respiratory: Negative for cough, shortness of breath and wheezing.    Cardiovascular: Negative for chest pain, palpitations and leg swelling.   Gastrointestinal: Negative for abdominal pain, constipation, diarrhea, heartburn, nausea and vomiting.   Genitourinary: Negative for dysuria, frequency and urgency.   Musculoskeletal: Negative for back pain and myalgias.   Skin: Negative for itching and rash.   Neurological: Negative for dizziness, focal weakness, weakness and headaches.   Psychiatric/Behavioral: Negative for depression and suicidal ideas.       Objective Data:   Physical Exam:   Vitals:   Temp:  [36 °C (96.8 °F)-36.5 °C (97.7 °F)] 36.4 °C (97.5 °F)  Pulse:  [63-69] 69  Resp:  [16-18] 18  BP: (147-163)/(68-94) 148/94  SpO2:  [92 %-94 %] 92 %    Physical Exam  Constitutional:       General: She is not in acute distress.     Appearance: Normal appearance. She is normal weight. She is not ill-appearing.   HENT:      Head: Normocephalic and atraumatic.      Nose: Nose normal.      Mouth/Throat:      Mouth: Mucous membranes are dry.      Pharynx: Oropharynx is clear.   Eyes:      General: No scleral icterus.     Extraocular Movements: Extraocular movements intact.      Conjunctiva/sclera: Conjunctivae normal.      Pupils: Pupils are equal, round, and reactive to light.   Neck:       Musculoskeletal: Normal range of motion and neck supple.   Cardiovascular:      Rate and Rhythm: Normal rate and regular rhythm.      Pulses: Normal pulses.      Heart sounds: Normal heart sounds. No murmur. No gallop.    Pulmonary:      Effort: Pulmonary effort is normal.      Breath sounds: Normal breath sounds. No wheezing, rhonchi or rales.   Abdominal:      General: Abdomen is flat. Bowel sounds are normal. There is no distension.      Palpations: Abdomen is soft.      Tenderness: There is no abdominal tenderness. There is no right CVA tenderness, left CVA tenderness or guarding.   Skin:     General: Skin is warm and dry.   Neurological:      General: No focal deficit present.      Mental Status: She is alert and oriented to person, place, and time. Mental status is at baseline.      Cranial Nerves: No cranial nerve deficit.      Sensory: No sensory deficit.   Psychiatric:         Mood and Affect: Mood normal.         Behavior: Behavior normal.         Thought Content: Thought content normal.         Judgment: Judgment normal.       Labs:   Recent Results (from the past 24 hour(s))   ACCU-CHEK GLUCOSE    Collection Time: 09/13/20  5:22 PM   Result Value Ref Range    Glucose - Accu-Ck 149 (H) 65 - 99 mg/dL   ACCU-CHEK GLUCOSE    Collection Time: 09/13/20  8:19 PM   Result Value Ref Range    Glucose - Accu-Ck 288 (H) 65 - 99 mg/dL   MAGNESIUM    Collection Time: 09/14/20  6:59 AM   Result Value Ref Range    Magnesium 2.0 1.5 - 2.5 mg/dL   CBC WITHOUT DIFFERENTIAL    Collection Time: 09/14/20  6:59 AM   Result Value Ref Range    WBC 9.7 4.8 - 10.8 K/uL    RBC 3.89 (L) 4.20 - 5.40 M/uL    Hemoglobin 12.5 12.0 - 16.0 g/dL    Hematocrit 38.0 37.0 - 47.0 %    MCV 97.7 81.4 - 97.8 fL    MCH 32.1 27.0 - 33.0 pg    MCHC 32.9 (L) 33.6 - 35.0 g/dL    RDW 49.8 35.9 - 50.0 fL    Platelet Count 272 164 - 446 K/uL    MPV 9.8 9.0 - 12.9 fL   Basic Metabolic Panel    Collection Time: 09/14/20  6:59 AM   Result Value Ref Range     Sodium 136 135 - 145 mmol/L    Potassium 4.1 3.6 - 5.5 mmol/L    Chloride 101 96 - 112 mmol/L    Co2 24 20 - 33 mmol/L    Glucose 144 (H) 65 - 99 mg/dL    Bun 28 (H) 8 - 22 mg/dL    Creatinine 1.11 0.50 - 1.40 mg/dL    Calcium 9.0 8.5 - 10.5 mg/dL    Anion Gap 11.0 7.0 - 16.0   PHOSPHORUS    Collection Time: 09/14/20  6:59 AM   Result Value Ref Range    Phosphorus 4.3 2.5 - 4.5 mg/dL   ESTIMATED GFR    Collection Time: 09/14/20  6:59 AM   Result Value Ref Range    GFR If  57 (A) >60 mL/min/1.73 m 2    GFR If Non  47 (A) >60 mL/min/1.73 m 2   ACCU-CHEK GLUCOSE    Collection Time: 09/14/20  8:31 AM   Result Value Ref Range    Glucose - Accu-Ck 166 (H) 65 - 99 mg/dL       Imaging:   Independant Imaging Review: Completed  EC-ECHOCARDIOGRAM COMPLETE W/O CONT   Final Result      IR-US GUIDED PIV   Final Result    Ultrasound-guided PERIPHERAL IV INSERTION performed by    qualified nursing staff as above.      CT-HEAD W/O   Final Result         1. No acute intracranial abnormality. No evidence of acute intracranial hemorrhage or mass lesion.               DX-CHEST-PORTABLE (1 VIEW)   Final Result      Cardiomegaly with mild pulmonary vascular congestion and interstitial edema.      Atherosclerotic plaque.      Stable elevation of the right hemidiaphragm.             Problem Representation:    CHF (congestive heart failure) ([facility])  Assessment & Plan  History of CHF her last ECHO was in spring of 2019, which showed EF of 45%. She has trace pitting edema of the LE and very subtle bibasilar crackles. She takes lasix and spironolactone at home and reports doing so regularly. Troponin elevated at 53 [place] at 101[address]. Echo demonstrates LVEF 40%, RV systolic pressure 100 mmHg, and basal hyperkinesis with apical hypokinesis, and severe aortic stenosis.  -lisinopril 10mg PO qD  -carvedilol 6.25mg BID  -restart furosemide 20 mg qd prn  -restart spironolactone 25 mg qd prn  -follow up cardiology as  outpatient  -follow up PCP as outpatient    HTN (hypertension)- (present on admission)  Assessment & Plan  Has history of hypertension on home lisinopril, carvedilol, furosemide, and spironolactone  -continue dose reduced lisinopril 10 mg qd  -dose reduce carvedilol 6.25 mg BID  -dose reduce furosemide at 20 mg qd prn as outpatient  -spironolactone 25 mg qd prn as outpatient    Hypoglycemia associated with diabetes (HCC)- (present on admission)  Assessment & Plan  Hypoglycemia during symptoms, corrected after D50 by EMS. At home, uses 10 units glargine. Repeat A1C at 6.4% this admission.  -continue to monitor  -SSI  -BG protocols  -d/c home glargine  -follow up with PCP as outpatient to reassess glucose levels    JOSE C (acute kidney injury) (HCC)  Assessment & Plan  -Improving  -continue to encourage oral rehydration    Chronic atrial fibrillation (HCC)  Assessment & Plan  History of atrial fibrillation, could be a component of her heart failure. At ED evaluation, was regularly irregular with rate of 70-80. On eliquis for Afib and CHF.  -dose reduce apixaban to 2.5 mg BID  -dose reduce carvedilol 6.25mg BID    CAD (coronary artery disease)- (present on admission)  Assessment & Plan  History of CAD with CABG done in 2004.  -continue Atorvastatin, carvedilol, lisinopril  -restart furosemide 20 mg qd prn  -restart spironolactone 25 mg qd prn  -follow up with PCP outpatient  -follow up with cardiology outpatient    Torin Santiago MD  PGY1 Internal Medicine

## 2020-09-14 NOTE — DISCHARGE PLANNING
Anticipated Discharge Disposition: Home with [facility], [facility] accepted patient.    Action: Patient is being discharged today.  is already setup for this patient. No further needs from  at this time.    Barriers to Discharge: None.    Plan: D/C home.

## 2020-09-15 ENCOUNTER — PATIENT OUTREACH (OUTPATIENT)
Dept: MEDICAL GROUP | Facility: MEDICAL CENTER | Age: 85
End: 2020-09-15

## 2020-09-16 ENCOUNTER — HOME CARE VISIT (OUTPATIENT)
Dept: HOME HEALTH SERVICES | Facility: HOME HEALTHCARE | Age: 85
End: 2020-09-16
Payer: MEDICARE

## 2020-09-16 VITALS
HEART RATE: 74 BPM | BODY MASS INDEX: 22.66 KG/M2 | HEIGHT: 61 IN | RESPIRATION RATE: 16 BRPM | WEIGHT: 120 LBS | DIASTOLIC BLOOD PRESSURE: 54 MMHG | SYSTOLIC BLOOD PRESSURE: 124 MMHG | TEMPERATURE: 97.7 F | OXYGEN SATURATION: 94 %

## 2020-09-16 LAB
COLLECT DURATION TIME SPEC: NORMAL HRS
CREAT 24H UR-MCNC: 63 MG/DL
CREAT 24H UR-MRATE: NORMAL MG/D (ref 400–1300)
SPECIMEN VOL ?TM UR: NORMAL ML
URATE 24H UR-MCNC: 26 MG/DL
URATE 24H UR-MRATE: NORMAL MG/D (ref 250–750)

## 2020-09-16 PROCEDURE — G0493 RN CARE EA 15 MIN HH/HOSPICE: HCPCS

## 2020-09-16 PROCEDURE — 665001 SOC-HOME HEALTH

## 2020-09-16 SDOH — ECONOMIC STABILITY: INCOME INSECURITY: HOW HARD IS IT FOR YOU TO PAY FOR THE VERY BASICS LIKE FOOD, HOUSING, MEDICAL CARE, AND HEATING?: NOT HARD AT ALL

## 2020-09-16 SDOH — ECONOMIC STABILITY: TRANSPORTATION INSECURITY
IN THE PAST 12 MONTHS, HAS LACK OF TRANSPORTATION KEPT YOU FROM MEETINGS, WORK, OR FROM GETTING THINGS NEEDED FOR DAILY LIVING?: NO

## 2020-09-16 SDOH — ECONOMIC STABILITY: TRANSPORTATION INSECURITY
IN THE PAST 12 MONTHS, HAS THE LACK OF TRANSPORTATION KEPT YOU FROM MEDICAL APPOINTMENTS OR FROM GETTING MEDICATIONS?: NO

## 2020-09-16 ASSESSMENT — ENCOUNTER SYMPTOMS
SHORTNESS OF BREATH: T
NAUSEA: DENIES
VOMITING: DENIES

## 2020-09-16 ASSESSMENT — FIBROSIS 4 INDEX: FIB4 SCORE: 1.74

## 2020-09-16 ASSESSMENT — PATIENT HEALTH QUESTIONNAIRE - PHQ9
2. FEELING DOWN, DEPRESSED, IRRITABLE, OR HOPELESS: 00
1. LITTLE INTEREST OR PLEASURE IN DOING THINGS: 00
CLINICAL INTERPRETATION OF PHQ2 SCORE: 0

## 2020-09-16 NOTE — PROGRESS NOTES
DIAGNOSIS AND MEDICAL HISTORY: Essential HTN, CHF, DM.  Pt. has had IDDM >20 years. Recently hospitalized related to hypoglycemia with a fall in the home.  She states that her Right foot and knee are still soar.  I see no overt injury upon assessment.     SOC to  for DM, CHF, Anticoagulation/Bleeding precautions and UTI prevention. Admitted from [facility].    Chief compliant / Relevant medical history / Short Clin ical Summary: Pt. lives alone with lack of a primary caregiver, refused MSW referral at this time. CVA 5 years ago anticoag. therapy noted. Changes in her cardiac medication and D/C of her insulin were made this hospitalization. Pt. denies a CC, poor historian regarding purpose of medication and health history. She exhibits SOB with amb. > 3-4 yards at one time and is incontinent of urine.  Dependent on 4 wheeled walker for stability.     Mentation: A&Ox4    Date Discharged from Hospital/Rehab/SNF: 9/14/2020    Names of physicians involved: Dr. Alexx OTERO and Dr. BRIANA Ruiz for cardiology    LIVING SITUATION AND CAREGIVING:   Homebound Status: difficulty with ambulation/transfers, frequent falls, needs assistance to leave home, needs assistive devices to ambulate, shortness of breath with minimal activity, unsafe to drive or leave residence without assistance and weaknes s and fatigue  Type of Home:SFR  Lives with:Self  Receives Assistance:Meals on Wheels  Unresolved Safety Concerns:Lack of Primary CGR. Refused MSW referral at this time    Does the patient have an Advanced Directive?    No Advanced Directive, interested in information.  MSW evaluation requested.    Does the patient have a POLST?     No POLST, provided form and explained to patient to follow up with physician at next appointment    TERRI RODRIGUEZ FOR HOME HEALTH/SUPPORTING INFORMATION:  Skilled Nursing to assess and teach the following but not limited to: Medications/High Risk, Fall prevention, hydration, nutrition, infection  control and S&S, Home safety.    PRIOR LEVEL OF FUNCTION:   Ambulation and Mobility: Supervised  Patient Equipment: walker for ambulation.     CURRENT LEVEL OF FUNCTION:   Ambulation and Mobility: Supervised  Patient Equipment: walker for ambulation.   Tr ansferring: Supervised  Bathing: Shower  Dressing: Supervised  Special equipment used: Walker    MEDICATION MANAGEMENT:  Patient uses pharmacy:   Able to take independently  Medication issues: Has duplicate medications (eliquis and    aldactone) and refused to place the bottles in another place or combine medications.

## 2020-09-16 NOTE — PROGRESS NOTES
9/16. Community Health Worker Intake  • Social determinates of health intake complete.   • Identified no barriers.  • Contact information provided to Ana Torres.  • Has PCP appointment scheduled for 9/18 at 11:40am.  • Inpatient assessment completed.  • Did the patient receive medications post discharge: Yes    CHW Pedro spoke with pt via TC to follow up after dc and introduce CCM services. Reviewed and discussed AVS with Robyn. Robyn accepted [facility] services. This CHW informed GSC and LVM. [facility] She reports no trouble paying for resources such as care, housing, and food. Robyn reports she feels confident in managing her health after d/c. Robyn declined to speak with CCM RN for health education, questions, and concerns. Robyn reports no other needs from this CHW. Robyn met all goals, and will be d/c from CCM services.

## 2020-09-17 ENCOUNTER — ANTICOAGULATION MONITORING (OUTPATIENT)
Dept: MEDICAL GROUP | Facility: PHYSICIAN GROUP | Age: 85
End: 2020-09-17

## 2020-09-17 ENCOUNTER — HOME CARE VISIT (OUTPATIENT)
Dept: HOME HEALTH SERVICES | Facility: HOME HEALTHCARE | Age: 85
End: 2020-09-17
Payer: MEDICARE

## 2020-09-17 VITALS
DIASTOLIC BLOOD PRESSURE: 50 MMHG | RESPIRATION RATE: 16 BRPM | OXYGEN SATURATION: 96 % | SYSTOLIC BLOOD PRESSURE: 130 MMHG | BODY MASS INDEX: 22.67 KG/M2 | HEART RATE: 70 BPM | TEMPERATURE: 97.9 F | WEIGHT: 120 LBS

## 2020-09-17 PROCEDURE — G0151 HHCP-SERV OF PT,EA 15 MIN: HCPCS

## 2020-09-17 SDOH — ECONOMIC STABILITY: HOUSING INSECURITY
HOME SAFETY: HOME WITH GRAB BARS AT FREQUENLTY USED SPOTS: BATHROOMS, AND STEP INTO LAUNDRY ROOM.   BATHROOM GRAB BARS HIGH FOR HER HEIGHT, RECOMMEND ADDL LOWER, SHE IS ALSO HAVING SON ADD ON AT TOILET.

## 2020-09-17 ASSESSMENT — ACTIVITIES OF DAILY LIVING (ADL)
PHYSICAL TRANSFERS ASSESSED: 1
AMBULATION ASSISTANCE: INDEPENDENT
AMBULATION ASSISTANCE ON FLAT SURFACES: 1
AMBULATION ASSISTANCE: 1
CURRENT_FUNCTION: INDEPENDENT

## 2020-09-17 ASSESSMENT — FIBROSIS 4 INDEX: FIB4 SCORE: 1.74

## 2020-09-17 ASSESSMENT — ENCOUNTER SYMPTOMS
LIMITED RANGE OF MOTION: 1
MUSCLE WEAKNESS: 1

## 2020-09-17 NOTE — PROGRESS NOTES
Received referral from [facility]. Medications reviewed. No clinically significant interactions noted.       Continue to monitor potassium levels on her current blood pressure medications.  They are currently within normal limits.    Juan José Bernardo, PharmD, MS, BCACP, [facility]  Phone [phone] fax [phone]    This note was created using voice recognition software (Dragon). The accuracy of the dictation is limited by the abilities of the software. I have reviewed the note prior to signing, however some errors in grammar and context are still possible. If you have any questions related to this note please do not hesitate to contact our office.

## 2020-09-18 ENCOUNTER — HOME CARE VISIT (OUTPATIENT)
Dept: HOME HEALTH SERVICES | Facility: HOME HEALTHCARE | Age: 85
End: 2020-09-18
Payer: MEDICARE

## 2020-09-18 PROCEDURE — G0152 HHCP-SERV OF OT,EA 15 MIN: HCPCS

## 2020-09-19 ENCOUNTER — HOME CARE VISIT (OUTPATIENT)
Dept: HOME HEALTH SERVICES | Facility: HOME HEALTHCARE | Age: 85
End: 2020-09-19
Payer: MEDICARE

## 2020-09-19 PROCEDURE — G0495 RN CARE TRAIN/EDU IN HH: HCPCS

## 2020-09-20 VITALS
RESPIRATION RATE: 16 BRPM | DIASTOLIC BLOOD PRESSURE: 70 MMHG | TEMPERATURE: 97.1 F | HEART RATE: 78 BPM | OXYGEN SATURATION: 94 % | SYSTOLIC BLOOD PRESSURE: 126 MMHG

## 2020-09-20 ASSESSMENT — ACTIVITIES OF DAILY LIVING (ADL)
TRANSPORTATION ASSESSED: 1
AMBULATION ASSISTANCE: INDEPENDENT
USING THE TELPHONE: INDEPENDENT
DRESSING_LB_CURRENT_FUNCTION: INDEPENDENT
ORAL_CARE_ASSESSED: 1
AMBULATION ASSISTANCE: 1
PHYSICAL TRANSFERS ASSESSED: 1
GROOMING ASSESSED: 1
DRESSING_UB_CURRENT_FUNCTION: INDEPENDENT
TOILETING: INDEPENDENT
BATHING_CURRENT_FUNCTION: SUPERVISION
TOILETING: 1
LAUNDRY ASSESSED: 1
PREPARING MEALS: NEEDS ASSISTANCE
TELEPHONE USE ASSESSED: 1
LIGHT HOUSEKEEPING: NEEDS ASSISTANCE
LAUNDRY: NEEDS ASSISTANCE
BATHING ASSESSED: 1
TRANSPORTATION: DEPENDENT
CURRENT_FUNCTION: INDEPENDENT
SHOPPING ASSESSED: 1
GROOMING_CURRENT_FUNCTION: INDEPENDENT
SHOPPING: NEEDS ASSISTANCE
HOUSEKEEPING ASSESSED: 1
FEEDING: INDEPENDENT
ORAL_CARE_CURRENT_FUNCTION: INDEPENDENT
FEEDING ASSESSED: 1

## 2020-09-20 ASSESSMENT — ENCOUNTER SYMPTOMS
POOR JUDGMENT: 1
DIFFICULTY THINKING: 1

## 2020-09-21 ENCOUNTER — HOME CARE VISIT (OUTPATIENT)
Dept: HOME HEALTH SERVICES | Facility: HOME HEALTHCARE | Age: 85
End: 2020-09-21
Payer: MEDICARE

## 2020-09-21 VITALS
TEMPERATURE: 98.6 F | HEART RATE: 55 BPM | WEIGHT: 120 LBS | OXYGEN SATURATION: 92 % | BODY MASS INDEX: 22.67 KG/M2 | RESPIRATION RATE: 18 BRPM | DIASTOLIC BLOOD PRESSURE: 70 MMHG | SYSTOLIC BLOOD PRESSURE: 132 MMHG

## 2020-09-21 VITALS
OXYGEN SATURATION: 99 % | SYSTOLIC BLOOD PRESSURE: 138 MMHG | TEMPERATURE: 97.3 F | HEART RATE: 63 BPM | RESPIRATION RATE: 16 BRPM | DIASTOLIC BLOOD PRESSURE: 80 MMHG

## 2020-09-21 VITALS
DIASTOLIC BLOOD PRESSURE: 70 MMHG | OXYGEN SATURATION: 92 % | HEART RATE: 62 BPM | WEIGHT: 120 LBS | BODY MASS INDEX: 22.67 KG/M2 | RESPIRATION RATE: 18 BRPM | TEMPERATURE: 98.6 F | SYSTOLIC BLOOD PRESSURE: 132 MMHG

## 2020-09-21 PROCEDURE — G0299 HHS/HOSPICE OF RN EA 15 MIN: HCPCS

## 2020-09-21 PROCEDURE — G0151 HHCP-SERV OF PT,EA 15 MIN: HCPCS

## 2020-09-21 ASSESSMENT — ACTIVITIES OF DAILY LIVING (ADL)
CURRENT_FUNCTION: STAND BY ASSIST
AMBULATION ASSISTANCE: STAND BY ASSIST

## 2020-09-21 ASSESSMENT — ENCOUNTER SYMPTOMS
LIMITED RANGE OF MOTION: 1
NAUSEA: DENIES
MUSCLE WEAKNESS: 1
NAUSEA: DENIES
VOMITING: DENIES
VOMITING: DENIES

## 2020-09-21 ASSESSMENT — FIBROSIS 4 INDEX
FIB4 SCORE: 1.74
FIB4 SCORE: 1.74

## 2020-09-22 ENCOUNTER — HOME CARE VISIT (OUTPATIENT)
Dept: HOME HEALTH SERVICES | Facility: HOME HEALTHCARE | Age: 85
End: 2020-09-22
Payer: MEDICARE

## 2020-09-22 ENCOUNTER — TELEPHONE (OUTPATIENT)
Dept: CARDIOLOGY | Facility: MEDICAL CENTER | Age: 85
End: 2020-09-22

## 2020-09-22 PROCEDURE — G0152 HHCP-SERV OF OT,EA 15 MIN: HCPCS

## 2020-09-22 ASSESSMENT — ENCOUNTER SYMPTOMS: SEVERE DYSPNEA: 1

## 2020-09-22 ASSESSMENT — ACTIVITIES OF DAILY LIVING (ADL): OASIS_M1830: 03

## 2020-09-22 NOTE — TELEPHONE ENCOUNTER
Your patient has been flagged through our Valve Net program with the following echocardiogram results:    Possible low flow low gradient severe aortic stenosis  -- Scheduled hospital follow up with Dr. Ruiz 9/23    If appropriate, please place Referral to Cardiology-Valve Program.    Please let us know if you have any questions about this program.    Teetee Farrell RN, BSN, FNP student [employer], [employer]  Cardiology Nurse

## 2020-09-23 ENCOUNTER — HOME CARE VISIT (OUTPATIENT)
Dept: HOME HEALTH SERVICES | Facility: HOME HEALTHCARE | Age: 85
End: 2020-09-23
Payer: MEDICARE

## 2020-09-23 ENCOUNTER — APPOINTMENT (OUTPATIENT)
Dept: CARDIOLOGY | Facility: MEDICAL CENTER | Age: 85
End: 2020-09-23
Payer: MEDICARE

## 2020-09-23 VITALS
BODY MASS INDEX: 22.67 KG/M2 | HEART RATE: 68 BPM | DIASTOLIC BLOOD PRESSURE: 70 MMHG | WEIGHT: 120 LBS | RESPIRATION RATE: 16 BRPM | TEMPERATURE: 98.1 F | SYSTOLIC BLOOD PRESSURE: 124 MMHG | OXYGEN SATURATION: 100 %

## 2020-09-23 VITALS
HEART RATE: 67 BPM | RESPIRATION RATE: 18 BRPM | TEMPERATURE: 97 F | DIASTOLIC BLOOD PRESSURE: 61 MMHG | OXYGEN SATURATION: 91 % | SYSTOLIC BLOOD PRESSURE: 141 MMHG

## 2020-09-23 PROCEDURE — G0495 RN CARE TRAIN/EDU IN HH: HCPCS

## 2020-09-23 PROCEDURE — G0155 HHCP-SVS OF CSW,EA 15 MIN: HCPCS

## 2020-09-23 PROCEDURE — G0180 MD CERTIFICATION HHA PATIENT: HCPCS | Performed by: FAMILY MEDICINE

## 2020-09-23 ASSESSMENT — ENCOUNTER SYMPTOMS
VOMITING: DENIES
NAUSEA: DENIES
DIFFICULTY THINKING: 1
MUSCLE WEAKNESS: 1

## 2020-09-23 ASSESSMENT — ACTIVITIES OF DAILY LIVING (ADL)
CURRENT_FUNCTION: STAND BY ASSIST
AMBULATION ASSISTANCE: STAND BY ASSIST

## 2020-09-23 ASSESSMENT — FIBROSIS 4 INDEX: FIB4 SCORE: 1.74

## 2020-09-24 ENCOUNTER — HOME CARE VISIT (OUTPATIENT)
Dept: HOME HEALTH SERVICES | Facility: HOME HEALTHCARE | Age: 85
End: 2020-09-24
Payer: MEDICARE

## 2020-09-24 PROCEDURE — G0152 HHCP-SERV OF OT,EA 15 MIN: HCPCS

## 2020-09-25 ENCOUNTER — HOME CARE VISIT (OUTPATIENT)
Dept: HOME HEALTH SERVICES | Facility: HOME HEALTHCARE | Age: 85
End: 2020-09-25
Payer: MEDICARE

## 2020-09-25 VITALS
HEART RATE: 65 BPM | TEMPERATURE: 98 F | RESPIRATION RATE: 18 BRPM | OXYGEN SATURATION: 95 % | BODY MASS INDEX: 22.67 KG/M2 | WEIGHT: 120 LBS | SYSTOLIC BLOOD PRESSURE: 146 MMHG | DIASTOLIC BLOOD PRESSURE: 72 MMHG

## 2020-09-25 PROCEDURE — G0495 RN CARE TRAIN/EDU IN HH: HCPCS

## 2020-09-25 ASSESSMENT — ACTIVITIES OF DAILY LIVING (ADL)
AMBULATION ASSISTANCE: STAND BY ASSIST
CURRENT_FUNCTION: STAND BY ASSIST

## 2020-09-25 ASSESSMENT — FIBROSIS 4 INDEX: FIB4 SCORE: 1.74

## 2020-09-25 ASSESSMENT — ENCOUNTER SYMPTOMS
VOMITING: DENIES
MUSCLE WEAKNESS: 1
NAUSEA: DENIES
LIMITED RANGE OF MOTION: 1

## 2020-09-27 VITALS
OXYGEN SATURATION: 94 % | SYSTOLIC BLOOD PRESSURE: 128 MMHG | DIASTOLIC BLOOD PRESSURE: 76 MMHG | HEART RATE: 67 BPM | RESPIRATION RATE: 16 BRPM | TEMPERATURE: 97.3 F

## 2020-09-27 ASSESSMENT — ENCOUNTER SYMPTOMS: POOR JUDGMENT: 1

## 2020-09-28 ENCOUNTER — HOME CARE VISIT (OUTPATIENT)
Dept: HOME HEALTH SERVICES | Facility: HOME HEALTHCARE | Age: 85
End: 2020-09-28
Payer: MEDICARE

## 2020-09-28 VITALS
RESPIRATION RATE: 17 BRPM | OXYGEN SATURATION: 94 % | TEMPERATURE: 98.2 F | HEART RATE: 61 BPM | WEIGHT: 120 LBS | DIASTOLIC BLOOD PRESSURE: 72 MMHG | SYSTOLIC BLOOD PRESSURE: 148 MMHG | BODY MASS INDEX: 22.67 KG/M2

## 2020-09-28 VITALS
SYSTOLIC BLOOD PRESSURE: 148 MMHG | OXYGEN SATURATION: 97 % | TEMPERATURE: 98.2 F | WEIGHT: 120 LBS | DIASTOLIC BLOOD PRESSURE: 72 MMHG | RESPIRATION RATE: 18 BRPM | BODY MASS INDEX: 22.67 KG/M2 | HEART RATE: 60 BPM

## 2020-09-28 PROCEDURE — G0495 RN CARE TRAIN/EDU IN HH: HCPCS

## 2020-09-28 PROCEDURE — G0151 HHCP-SERV OF PT,EA 15 MIN: HCPCS

## 2020-09-28 ASSESSMENT — ENCOUNTER SYMPTOMS
MUSCLE WEAKNESS: 1
NAUSEA: DENIES
VOMITING: DENIES
LIMITED RANGE OF MOTION: 1

## 2020-09-28 ASSESSMENT — ACTIVITIES OF DAILY LIVING (ADL)
CURRENT_FUNCTION: STAND BY ASSIST
AMBULATION ASSISTANCE: STAND BY ASSIST

## 2020-09-28 ASSESSMENT — FIBROSIS 4 INDEX
FIB4 SCORE: 1.74
FIB4 SCORE: 1.74

## 2020-09-28 NOTE — DOCUMENTATION QUERY
[facility]                                                                       Query Response Note      PATIENT:               MORRO PINZON  ACCT #:                  [id number]  MRN:                     [id number]  :                      1935  ADMIT DATE:       9/10/2020 2:05 PM  DISCH DATE:        2020 3:23 PM  RESPONDING  PROVIDER #:        [id number]           QUERY TEXT:    Pt has documented Acute Metabolic Encephalopathy noted as ?rule out? or similar terminology such as suspected, probable, questionable, etc.  Please clarify status of this condition:    NOTE:  If an appropriate response is not listed below, please respond with a new note.       The patient's Clinical Indicators include:  Progress Note,  and D/C summary mention Acute Encephalopathy, Metabolic, Syncope? - Resolved with HR, fluids, dextrose.  Acute Metabolic Encephalopathy is not documented anywhere else in this chart.  Options provided:   -- Acute Metabolic Encephalopathy is ruled in   -- Acute Metabolic Encephalopathy is ruled out   -- Unable to determine      Query created by: Florencia Hartman on 2020 5:15 AM    RESPONSE TEXT:    Acute Metabolic Encephalopathy is ruled out          Electronically signed by:  ISABELLE RODRIGUES MD 2020 2:01 PM

## 2020-09-29 ENCOUNTER — HOME CARE VISIT (OUTPATIENT)
Dept: HOME HEALTH SERVICES | Facility: HOME HEALTHCARE | Age: 85
End: 2020-09-29
Payer: MEDICARE

## 2020-09-29 PROCEDURE — G0155 HHCP-SVS OF CSW,EA 15 MIN: HCPCS

## 2020-09-29 PROCEDURE — G0152 HHCP-SERV OF OT,EA 15 MIN: HCPCS

## 2020-09-30 ENCOUNTER — HOME CARE VISIT (OUTPATIENT)
Dept: HOME HEALTH SERVICES | Facility: HOME HEALTHCARE | Age: 85
End: 2020-09-30
Payer: MEDICARE

## 2020-09-30 ENCOUNTER — OFFICE VISIT (OUTPATIENT)
Dept: MEDICAL GROUP | Facility: MEDICAL CENTER | Age: 85
End: 2020-09-30
Payer: MEDICARE

## 2020-09-30 VITALS
DIASTOLIC BLOOD PRESSURE: 84 MMHG | RESPIRATION RATE: 20 BRPM | TEMPERATURE: 98.3 F | SYSTOLIC BLOOD PRESSURE: 154 MMHG | WEIGHT: 120 LBS | BODY MASS INDEX: 24.24 KG/M2 | HEART RATE: 61 BPM | OXYGEN SATURATION: 97 %

## 2020-09-30 VITALS
HEART RATE: 64 BPM | TEMPERATURE: 98.3 F | SYSTOLIC BLOOD PRESSURE: 147 MMHG | OXYGEN SATURATION: 96 % | DIASTOLIC BLOOD PRESSURE: 77 MMHG | RESPIRATION RATE: 17 BRPM

## 2020-09-30 VITALS
WEIGHT: 120 LBS | SYSTOLIC BLOOD PRESSURE: 140 MMHG | DIASTOLIC BLOOD PRESSURE: 80 MMHG | HEIGHT: 59 IN | TEMPERATURE: 97 F | BODY MASS INDEX: 24.19 KG/M2 | RESPIRATION RATE: 20 BRPM | HEART RATE: 68 BPM | OXYGEN SATURATION: 96 %

## 2020-09-30 DIAGNOSIS — I48.20 CHRONIC ATRIAL FIBRILLATION (HCC): ICD-10-CM

## 2020-09-30 DIAGNOSIS — E11.9 TYPE 2 DIABETES MELLITUS WITHOUT COMPLICATION, WITHOUT LONG-TERM CURRENT USE OF INSULIN (HCC): ICD-10-CM

## 2020-09-30 DIAGNOSIS — Z09 HOSPITAL DISCHARGE FOLLOW-UP: ICD-10-CM

## 2020-09-30 DIAGNOSIS — I27.20 PULMONARY HYPERTENSION (HCC): ICD-10-CM

## 2020-09-30 DIAGNOSIS — I35.0 SEVERE AORTIC STENOSIS: ICD-10-CM

## 2020-09-30 DIAGNOSIS — I50.21 ACUTE SYSTOLIC CONGESTIVE HEART FAILURE, NYHA CLASS 2 (HCC): ICD-10-CM

## 2020-09-30 DIAGNOSIS — Z23 NEED FOR VACCINATION: ICD-10-CM

## 2020-09-30 DIAGNOSIS — N18.30 CKD (CHRONIC KIDNEY DISEASE) STAGE 3, GFR 30-59 ML/MIN: ICD-10-CM

## 2020-09-30 PROCEDURE — G0495 RN CARE TRAIN/EDU IN HH: HCPCS

## 2020-09-30 PROCEDURE — G0008 ADMIN INFLUENZA VIRUS VAC: HCPCS | Performed by: FAMILY MEDICINE

## 2020-09-30 PROCEDURE — 90662 IIV NO PRSV INCREASED AG IM: CPT | Performed by: FAMILY MEDICINE

## 2020-09-30 PROCEDURE — 99214 OFFICE O/P EST MOD 30 MIN: CPT | Mod: 25 | Performed by: FAMILY MEDICINE

## 2020-09-30 ASSESSMENT — FIBROSIS 4 INDEX
FIB4 SCORE: 1.74
FIB4 SCORE: 1.74

## 2020-09-30 ASSESSMENT — ENCOUNTER SYMPTOMS
LIMITED RANGE OF MOTION: 1
NAUSEA: DENIES
ARTHRALGIAS: 1
VOMITING: DENIES
MUSCLE WEAKNESS: 1
POOR JUDGMENT: 1

## 2020-09-30 ASSESSMENT — ACTIVITIES OF DAILY LIVING (ADL)
CURRENT_FUNCTION: STAND BY ASSIST
AMBULATION ASSISTANCE: STAND BY ASSIST

## 2020-09-30 NOTE — PROGRESS NOTES
CC: Hospital discharge follow-up    HPI:   Ana presents today for posthospitalization follow-up    Patient was admitted to [facility] on 09/10/20 with syncope secondary to hypoglycemia and bradycardia. She received dextrose and atropine on admission. Labs on admission were in normal limits and CT head did not show any bleeding. During the course, she was started on intravenous fluids. Her carvedilol dose is decreased to prevent further bradycardia. She remained in sinus rhythm throughout the stay. Echocardiogram done showed moderate to severe aortic stenosis with EF of 40%, and severe pulmonary hypertension, right ventricular systolic pressure was 100 mmHg.  Cardiology recommended outpatient follow up for aortic stenosis. Despite the echocardiogram findings of low EF/severe pulmonary hypertension, she remained euvolemic.  Her home insulin is discontinued as her repeat HbA1c is 6.4 and hypoglycemia episodes. She improved gradually and physical therapy recommended home health services which were set up on discharge.  Patient was discharged in stable condition on 9/14/2020.    Came in today for follow-up.  Denies any cough, shortness of breath.  But she has been having bilateral leg swelling, she is currently on furosemide 20 mg daily.  Has been having normal heart rate.  although the patient has severe pulmonary hypertension based on the echocardiogram(right ventricular systolic pressure is 100 mmHg) she remains asymptomatic.  Also was found to have severe aortic stenosis, but she denies any chest pain, shortness of breath, but she developed syncopal episode in her last hospitalization.  Patient has an appointment with cardiology on 10/2/2020     Patient Active Problem List    Diagnosis Date Noted   • Physical deconditioning 03/24/2016     Priority: High   • Generalized weakness 03/19/2016     Priority: High   • Nephritis and nephropathy, not specified as acute or chronic, with other specified pathological  lesion in kidney, in diseases classified elsewhere 09/08/2014     Priority: High   • CHF (congestive heart failure) ([facility])      Priority: High   • HTN (hypertension) 11/23/2016     Priority: Medium   • Hypoglycemia associated with diabetes ([facility]) 07/15/2016     Priority: Medium   • JOSE C (acute kidney injury) ([facility]) 03/24/2016     Priority: Medium   • Acute renal failure superimposed on stage 3 chronic kidney disease ([facility]) 03/19/2016     Priority: Medium   • Hyponatremia 03/18/2016     Priority: Medium   • Aortic atherosclerosis ([facility]) 04/28/2015     Priority: Medium   • CKD (chronic kidney disease) stage 3, GFR 30-59 ml/min ([facility]) 10/07/2014     Priority: Medium   • Peripheral neuropathy 09/10/2009     Priority: Medium   • HTN (hypertension), benign      Priority: Medium   • DJD (degenerative joint disease)      Priority: Medium   • Hyperlipidemia      Priority: Low   • CAD (coronary artery disease)      Priority: Low   • Chronic atrial fibrillation ([facility]) 09/10/2020   • Type 2 diabetes mellitus without complication, with long-term current use of insulin ([facility]) 03/20/2017   • DM type 2 (diabetes mellitus, type 2) ([facility]) 11/23/2016   • Syncope 11/22/2016   • Acute systolic congestive heart failure, NYHA class 2 ([facility]) 11/22/2016   • 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   • Aspiration into airway 07/18/2016   • Acute respiratory failure ([facility]) 07/18/2016   • Lower urinary tract infectious disease 07/17/2016   • PAF (paroxysmal atrial fibrillation)-probably reason for Eliquis 07/17/2016   • Leukocytosis 07/16/2016   • Presbyesophagus 07/16/2016   • Medical home 03/25/2016   • Encounter for long-term (current) use of insulin ([facility]) 09/08/2014   • Renal failure 01/09/2013   • Cystocele        Current Outpatient Medications   Medication Sig Dispense Refill   • acetaminophen (TYLENOL) 650 MG CR tablet Take 650 mg by mouth every 6 hours as needed for Moderate Pain.     • atorvastatin (LIPITOR) 80 MG tablet Take 1 Tab by mouth every evening. 30  "Tab 0   • carvedilol (COREG) 6.25 MG Tab Take 1 Tab by mouth 2 times a day, with meals. 60 Tab 0   • lisinopril (PRINIVIL) 10 MG Tab Take 1 Tab by mouth every day. 30 Tab 0   • apixaban (ELIQUIS) 2.5mg Tab Take 1 Tab by mouth 2 Times a Day. 60 Tab 0   • furosemide (LASIX) 20 MG Tab Take 1 Tab by mouth 1 time daily as needed. 30 Tab 0   • spironolactone (ALDACTONE) 25 MG Tab Take 1 Tab by mouth 1 time daily as needed. 30 Tab 1   • Multiple Vitamins-Minerals (MULTI COMPLETE PO) Take  by mouth.       No current facility-administered medications for this visit.          Allergies as of 09/30/2020 - Reviewed 09/30/2020   Allergen Reaction Noted   • Tetracaine hcl [tetcaine]  10/29/2014   • Vancomycin  10/29/2014   • Vigamox [moxifloxacin]  10/29/2014        ROS: Denies any chest pain, Shortness of breath, Changes bowel or bladder, Lower extremity edema.    Physical Exam:  /80 (BP Location: Right arm, Patient Position: Sitting, BP Cuff Size: Adult)   Pulse 68   Temp 36.1 °C (97 °F) (Temporal)   Resp 20   Ht 1.499 m (4' 11\")   Wt 54.4 kg (120 lb)   SpO2 96%   BMI 24.24 kg/m²   Gen.: Well-developed, well-nourished, no apparent distress,pleasant and cooperative with the examination  Skin:  Warm and dry with good turgor. No rashes or suspicious lesions in visible areas  HEENT:Sinuses nontender with palpation, TMs clear, nares patent with pink mucosa and clear rhinorrhea,no septal deviation ,polyps or lesions. lips without lesions, oropharynx clear.  Neck: Trachea midline,no masses or adenopathy. No JVD.  Cor: Regular rate and rhythm without murmur, gallop or rub.  Lungs: Respirations unlabored.Clear to auscultation with equal breath sounds bilaterally. No wheezes, rhonchi.  Extremities: No cyanosis, clubbing or edema.      Assessment and Plan.   85 y.o. female     1. Hospital discharge follow-up  Hospital discharge summary reviewed.    2. Type 2 diabetes mellitus without complication, without long-term current " use of insulin ([facility])  Most recent A1c was 6.4.  Continue on diet control(Lantus was stopped because of hypoglycemic episode)    3. Pulmonary hypertension ([facility])  Last echo showed severe pulmonary hypertension, right ventricular systolic pressure was 100 mmHg.  However patient has been asymptomatic(no shortness of breath, however has bilateral leg swelling)  Continue torsemide 20 mg daily  Follow-up with cardiology    4. Acute systolic congestive heart failure, NYHA class 2 ([facility])  Currently asymptomatic.  Last echocardiogram showed ejection fraction of 40%  Continue on carvedilol and furosemide    5. Chronic atrial fibrillation ([facility])  No RVR.  Continue carvedilol and Eliquis    6. Severe aortic stenosis  Currently asymptomatic.  However had 1 syncopal episode during the last hospitalization.  Continue follow-up with cardiology, has an appointment on 10/2/2020    7. Need for vaccination  Flu shot is given today.    - INFLUENZA VACCINE, HIGH DOSE (65+ ONLY)    8. CKD (chronic kidney disease) stage 3, GFR 30-59 ml/min  Most recent GFR was 47, creatinine 1.1.  Recommend hydration and avoiding nephrotoxic medication.  Recommend proper control of blood pressure, heart disease, and diabetes.

## 2020-10-01 ENCOUNTER — HOME CARE VISIT (OUTPATIENT)
Dept: HOME HEALTH SERVICES | Facility: HOME HEALTHCARE | Age: 85
End: 2020-10-01
Payer: MEDICARE

## 2020-10-01 PROCEDURE — G0152 HHCP-SERV OF OT,EA 15 MIN: HCPCS

## 2020-10-02 ENCOUNTER — HOSPITAL ENCOUNTER (OUTPATIENT)
Facility: MEDICAL CENTER | Age: 85
End: 2020-10-02
Attending: INTERNAL MEDICINE
Payer: MEDICARE

## 2020-10-02 ENCOUNTER — OFFICE VISIT (OUTPATIENT)
Dept: CARDIOLOGY | Facility: MEDICAL CENTER | Age: 85
End: 2020-10-02
Payer: MEDICARE

## 2020-10-02 ENCOUNTER — HOME CARE VISIT (OUTPATIENT)
Dept: HOME HEALTH SERVICES | Facility: HOME HEALTHCARE | Age: 85
End: 2020-10-02
Payer: MEDICARE

## 2020-10-02 VITALS
DIASTOLIC BLOOD PRESSURE: 80 MMHG | RESPIRATION RATE: 18 BRPM | SYSTOLIC BLOOD PRESSURE: 160 MMHG | WEIGHT: 120 LBS | HEIGHT: 59 IN | HEART RATE: 74 BPM | OXYGEN SATURATION: 93 % | BODY MASS INDEX: 24.19 KG/M2

## 2020-10-02 DIAGNOSIS — I27.20 PULMONARY HYPERTENSION (HCC): ICD-10-CM

## 2020-10-02 DIAGNOSIS — E11.9 TYPE 2 DIABETES MELLITUS WITHOUT COMPLICATION, WITH LONG-TERM CURRENT USE OF INSULIN (HCC): ICD-10-CM

## 2020-10-02 DIAGNOSIS — N18.32 ACUTE RENAL FAILURE SUPERIMPOSED ON STAGE 3B CHRONIC KIDNEY DISEASE, UNSPECIFIED ACUTE RENAL FAILURE TYPE (HCC): ICD-10-CM

## 2020-10-02 DIAGNOSIS — N17.9 AKI (ACUTE KIDNEY INJURY) (HCC): ICD-10-CM

## 2020-10-02 DIAGNOSIS — N17.9 ACUTE RENAL FAILURE SUPERIMPOSED ON STAGE 3B CHRONIC KIDNEY DISEASE, UNSPECIFIED ACUTE RENAL FAILURE TYPE (HCC): ICD-10-CM

## 2020-10-02 DIAGNOSIS — I50.22 CHRONIC SYSTOLIC CONGESTIVE HEART FAILURE (HCC): ICD-10-CM

## 2020-10-02 DIAGNOSIS — Z79.899 HIGH RISK MEDICATION USE: ICD-10-CM

## 2020-10-02 DIAGNOSIS — I25.10 CORONARY ARTERY DISEASE INVOLVING NATIVE CORONARY ARTERY OF NATIVE HEART WITHOUT ANGINA PECTORIS: ICD-10-CM

## 2020-10-02 DIAGNOSIS — Z79.4 TYPE 2 DIABETES MELLITUS WITHOUT COMPLICATION, WITH LONG-TERM CURRENT USE OF INSULIN (HCC): ICD-10-CM

## 2020-10-02 DIAGNOSIS — I48.0 PAF (PAROXYSMAL ATRIAL FIBRILLATION) (HCC): ICD-10-CM

## 2020-10-02 DIAGNOSIS — I10 ESSENTIAL HYPERTENSION: ICD-10-CM

## 2020-10-02 DIAGNOSIS — J96.01 ACUTE RESPIRATORY FAILURE WITH HYPOXIA AND HYPERCAPNIA (HCC): ICD-10-CM

## 2020-10-02 DIAGNOSIS — I50.21 ACUTE SYSTOLIC CONGESTIVE HEART FAILURE, NYHA CLASS 2 (HCC): ICD-10-CM

## 2020-10-02 DIAGNOSIS — J96.02 ACUTE RESPIRATORY FAILURE WITH HYPOXIA AND HYPERCAPNIA (HCC): ICD-10-CM

## 2020-10-02 DIAGNOSIS — I48.20 CHRONIC ATRIAL FIBRILLATION (HCC): ICD-10-CM

## 2020-10-02 DIAGNOSIS — I70.0 ATHEROSCLEROSIS OF AORTA (HCC): ICD-10-CM

## 2020-10-02 DIAGNOSIS — E78.5 HYPERLIPIDEMIA, UNSPECIFIED HYPERLIPIDEMIA TYPE: ICD-10-CM

## 2020-10-02 LAB
25(OH)D3 SERPL-MCNC: 35 NG/ML (ref 30–100)
ALBUMIN SERPL BCP-MCNC: 3.6 G/DL (ref 3.2–4.9)
ALBUMIN SERPL BCP-MCNC: 3.7 G/DL (ref 3.2–4.9)
ALBUMIN/GLOB SERPL: 1.2 G/DL
ALBUMIN/GLOB SERPL: 1.2 G/DL
ALP SERPL-CCNC: 106 U/L (ref 30–99)
ALP SERPL-CCNC: 107 U/L (ref 30–99)
ALT SERPL-CCNC: 12 U/L (ref 2–50)
ALT SERPL-CCNC: 12 U/L (ref 2–50)
ANION GAP SERPL CALC-SCNC: 11 MMOL/L (ref 7–16)
ANION GAP SERPL CALC-SCNC: 12 MMOL/L (ref 7–16)
AST SERPL-CCNC: 14 U/L (ref 12–45)
AST SERPL-CCNC: 18 U/L (ref 12–45)
BASOPHILS # BLD AUTO: 0.4 % (ref 0–1.8)
BASOPHILS # BLD: 0.03 K/UL (ref 0–0.12)
BILIRUB SERPL-MCNC: 0.6 MG/DL (ref 0.1–1.5)
BILIRUB SERPL-MCNC: 0.6 MG/DL (ref 0.1–1.5)
BUN SERPL-MCNC: 17 MG/DL (ref 8–22)
BUN SERPL-MCNC: 17 MG/DL (ref 8–22)
CALCIUM SERPL-MCNC: 9.9 MG/DL (ref 8.5–10.5)
CALCIUM SERPL-MCNC: 9.9 MG/DL (ref 8.5–10.5)
CHLORIDE SERPL-SCNC: 100 MMOL/L (ref 96–112)
CHLORIDE SERPL-SCNC: 98 MMOL/L (ref 96–112)
CO2 SERPL-SCNC: 23 MMOL/L (ref 20–33)
CO2 SERPL-SCNC: 24 MMOL/L (ref 20–33)
CREAT SERPL-MCNC: 0.99 MG/DL (ref 0.5–1.4)
CREAT SERPL-MCNC: 0.99 MG/DL (ref 0.5–1.4)
EOSINOPHIL # BLD AUTO: 0.1 K/UL (ref 0–0.51)
EOSINOPHIL NFR BLD: 1.4 % (ref 0–6.9)
ERYTHROCYTE [DISTWIDTH] IN BLOOD BY AUTOMATED COUNT: 56.4 FL (ref 35.9–50)
ERYTHROCYTE [DISTWIDTH] IN BLOOD BY AUTOMATED COUNT: 56.9 FL (ref 35.9–50)
EST. AVERAGE GLUCOSE BLD GHB EST-MCNC: 140 MG/DL
GLOBULIN SER CALC-MCNC: 3.1 G/DL (ref 1.9–3.5)
GLOBULIN SER CALC-MCNC: 3.1 G/DL (ref 1.9–3.5)
GLUCOSE SERPL-MCNC: 150 MG/DL (ref 65–99)
GLUCOSE SERPL-MCNC: 152 MG/DL (ref 65–99)
HBA1C MFR BLD: 6.5 % (ref 0–5.6)
HCT VFR BLD AUTO: 36.2 % (ref 37–47)
HCT VFR BLD AUTO: 36.5 % (ref 37–47)
HGB BLD-MCNC: 11.8 G/DL (ref 12–16)
HGB BLD-MCNC: 11.9 G/DL (ref 12–16)
IMM GRANULOCYTES # BLD AUTO: 0.02 K/UL (ref 0–0.11)
IMM GRANULOCYTES NFR BLD AUTO: 0.3 % (ref 0–0.9)
LYMPHOCYTES # BLD AUTO: 0.77 K/UL (ref 1–4.8)
LYMPHOCYTES NFR BLD: 11 % (ref 22–41)
MCH RBC QN AUTO: 32.9 PG (ref 27–33)
MCH RBC QN AUTO: 33 PG (ref 27–33)
MCHC RBC AUTO-ENTMCNC: 32.6 G/DL (ref 33.6–35)
MCHC RBC AUTO-ENTMCNC: 32.6 G/DL (ref 33.6–35)
MCV RBC AUTO: 100.8 FL (ref 81.4–97.8)
MCV RBC AUTO: 101.1 FL (ref 81.4–97.8)
MONOCYTES # BLD AUTO: 0.6 K/UL (ref 0–0.85)
MONOCYTES NFR BLD AUTO: 8.6 % (ref 0–13.4)
NEUTROPHILS # BLD AUTO: 5.45 K/UL (ref 2–7.15)
NEUTROPHILS NFR BLD: 78.3 % (ref 44–72)
NRBC # BLD AUTO: 0 K/UL
NRBC BLD-RTO: 0 /100 WBC
NT-PROBNP SERPL IA-MCNC: ABNORMAL PG/ML (ref 0–125)
NT-PROBNP SERPL IA-MCNC: ABNORMAL PG/ML (ref 0–125)
PLATELET # BLD AUTO: 370 K/UL (ref 164–446)
PMV BLD AUTO: 10.3 FL (ref 9–12.9)
POTASSIUM SERPL-SCNC: 4.2 MMOL/L (ref 3.6–5.5)
POTASSIUM SERPL-SCNC: 4.4 MMOL/L (ref 3.6–5.5)
PROT SERPL-MCNC: 6.7 G/DL (ref 6–8.2)
PROT SERPL-MCNC: 6.8 G/DL (ref 6–8.2)
RBC # BLD AUTO: 3.58 M/UL (ref 4.2–5.4)
RBC # BLD AUTO: 3.62 M/UL (ref 4.2–5.4)
SODIUM SERPL-SCNC: 133 MMOL/L (ref 135–145)
SODIUM SERPL-SCNC: 135 MMOL/L (ref 135–145)
TSH SERPL DL<=0.005 MIU/L-ACNC: 2.33 UIU/ML (ref 0.38–5.33)
VIT B12 SERPL-MCNC: 809 PG/ML (ref 211–911)
WBC # BLD AUTO: 6.9 K/UL (ref 4.8–10.8)
WBC # BLD AUTO: 7 K/UL (ref 4.8–10.8)

## 2020-10-02 PROCEDURE — 85018 HEMOGLOBIN: CPT

## 2020-10-02 PROCEDURE — 80053 COMPREHEN METABOLIC PANEL: CPT

## 2020-10-02 PROCEDURE — 80053 COMPREHEN METABOLIC PANEL: CPT | Mod: 91

## 2020-10-02 PROCEDURE — 99215 OFFICE O/P EST HI 40 MIN: CPT | Performed by: INTERNAL MEDICINE

## 2020-10-02 PROCEDURE — 83036 HEMOGLOBIN GLYCOSYLATED A1C: CPT

## 2020-10-02 PROCEDURE — 83880 ASSAY OF NATRIURETIC PEPTIDE: CPT

## 2020-10-02 PROCEDURE — 82607 VITAMIN B-12: CPT

## 2020-10-02 PROCEDURE — 85014 HEMATOCRIT: CPT

## 2020-10-02 PROCEDURE — 83880 ASSAY OF NATRIURETIC PEPTIDE: CPT | Mod: 91

## 2020-10-02 PROCEDURE — 85048 AUTOMATED LEUKOCYTE COUNT: CPT

## 2020-10-02 PROCEDURE — 85041 AUTOMATED RBC COUNT: CPT

## 2020-10-02 PROCEDURE — 85027 COMPLETE CBC AUTOMATED: CPT

## 2020-10-02 PROCEDURE — 82306 VITAMIN D 25 HYDROXY: CPT

## 2020-10-02 PROCEDURE — 84443 ASSAY THYROID STIM HORMONE: CPT

## 2020-10-02 RX ORDER — FUROSEMIDE 20 MG/1
20 TABLET ORAL 2 TIMES DAILY
Qty: 60 TAB | Refills: 11 | Status: SHIPPED | OUTPATIENT
Start: 2020-10-02 | End: 2020-10-06

## 2020-10-02 RX ORDER — SPIRONOLACTONE 25 MG/1
25 TABLET ORAL
Qty: 30 TAB | Refills: 11 | Status: SHIPPED | OUTPATIENT
Start: 2020-10-02 | End: 2021-01-06

## 2020-10-02 ASSESSMENT — ENCOUNTER SYMPTOMS
GASTROINTESTINAL NEGATIVE: 1
COUGH: 0
ORTHOPNEA: 0
RESPIRATORY NEGATIVE: 1
SHORTNESS OF BREATH: 0
BRUISES/BLEEDS EASILY: 0
PND: 0
SORE THROAT: 0
WEAKNESS: 0
WHEEZING: 0
CARDIOVASCULAR NEGATIVE: 1
CHILLS: 0
CLAUDICATION: 0
LOSS OF CONSCIOUSNESS: 0
NEUROLOGICAL NEGATIVE: 1
PALPITATIONS: 0
DIZZINESS: 0
CONSTITUTIONAL NEGATIVE: 1
HEMOPTYSIS: 0
MUSCULOSKELETAL NEGATIVE: 1
STRIDOR: 0
FEVER: 0
EYES NEGATIVE: 1
SPUTUM PRODUCTION: 0

## 2020-10-02 ASSESSMENT — FIBROSIS 4 INDEX: FIB4 SCORE: 1.74

## 2020-10-02 NOTE — PROGRESS NOTES
Just FYI:  Missed Visit. Pt has an appt with her cardiologist today.  Thank you, Isamar Braun RN, CM

## 2020-10-02 NOTE — PROGRESS NOTES
"Chief Complaint   Patient presents with   • New Patient     Heat failure        Subjective:   Robyn Torres is a 85 y.o. female who presents today as a hospital follow-up for her valvular heart disease lower extremity edema pulmonary hypertension.  She was started on spironolactone and Lasix.  She says she is feeling less short of breath.  Her echocardiogram was difficult to interpret but did show some degree of possible low-flow low gradient aortic stenosis with a low ejection fraction.  She is small and frail.  She does take Eliquis for her atrial fibrillation.  She is on a low dose given her chronic kidney disease.  She is scheduled get labs today.    Past Medical History:   Diagnosis Date   • CAD (coronary artery disease)    • CATARACT    • CHF (congestive heart failure) ([facility])    • Cystocele    • Diabetes     insulin   • DJD (degenerative joint disease)    • HTN (hypertension), benign    • Hyperlipidemia    • Hypertension    • Medical home    • Myocardial infarct ([facility]) 2000   • PVD (peripheral vascular disease) ([facility])    • Renal disorder     \"watching my kidneys\"     • Unspecified urinary incontinence    • Urinary bladder disorder      Past Surgical History:   Procedure Laterality Date   • CATARACT PHACO WITH IOL  3/12/2014    Performed by Murtaza Casey M.D. at SURGERY SAME DAY [facility] ORS   • MULTIPLE CORONARY ARTERY BYPASS  2004   • ABDOMINAL HYSTERECTOMY TOTAL       Family History   Problem Relation Age of Onset   • Hypertension Mother    • Heart Disease Mother    • No Known Problems Maternal Grandmother    • No Known Problems Maternal Grandfather    • Diabetes Daughter    • Seizures Son    • No Known Problems Son    • Heart Disease Son      Social History     Socioeconomic History   • Marital status:      Spouse name: Not on file   • Number of children: Not on file   • Years of education: Not on file   • Highest education level: Not on file   Occupational History   • Not on file   Social " Needs   • Financial resource strain: Not hard at all   • Food insecurity     Worry: Never true     Inability: Never true   • Transportation needs     Medical: No     Non-medical: No   Tobacco Use   • Smoking status: Never Smoker   • Smokeless tobacco: Never Used   Substance and Sexual Activity   • Alcohol use: No   • Drug use: No   • Sexual activity: Never     Partners: Male   Lifestyle   • Physical activity     Days per week: Not on file     Minutes per session: Not on file   • Stress: Not on file   Relationships   • Social connections     Talks on phone: Not on file     Gets together: Not on file     Attends Taoism service: Not on file     Active member of club or organization: Not on file     Attends meetings of clubs or organizations: Not on file     Relationship status: Not on file   • Intimate partner violence     Fear of current or ex partner: Not on file     Emotionally abused: Not on file     Physically abused: Not on file     Forced sexual activity: Not on file   Other Topics Concern   • Not on file   Social History Narrative   • Not on file     Allergies   Allergen Reactions   • Tetracaine Hcl [Tetcaine]    • Vancomycin    • Vigamox [Moxifloxacin]      Outpatient Encounter Medications as of 10/2/2020   Medication Sig Dispense Refill   • spironolactone (ALDACTONE) 25 MG Tab Take 1 Tab by mouth 1 time daily as needed. 30 Tab 11   • furosemide (LASIX) 20 MG Tab Take 1 Tab by mouth 2 times a day. 60 Tab 11   • atorvastatin (LIPITOR) 80 MG tablet Take 1 Tab by mouth every evening. 30 Tab 0   • carvedilol (COREG) 6.25 MG Tab Take 1 Tab by mouth 2 times a day, with meals. 60 Tab 0   • lisinopril (PRINIVIL) 10 MG Tab Take 1 Tab by mouth every day. 30 Tab 0   • apixaban (ELIQUIS) 2.5mg Tab Take 1 Tab by mouth 2 Times a Day. 60 Tab 0   • Multiple Vitamins-Minerals (MULTI COMPLETE PO) Take  by mouth.     • [DISCONTINUED] acetaminophen (TYLENOL) 650 MG CR tablet Take 650 mg by mouth every 6 hours as needed for  "Moderate Pain.     • [DISCONTINUED] furosemide (LASIX) 20 MG Tab Take 1 Tab by mouth 1 time daily as needed. 30 Tab 0   • [DISCONTINUED] spironolactone (ALDACTONE) 25 MG Tab Take 1 Tab by mouth 1 time daily as needed. 30 Tab 1     No facility-administered encounter medications on file as of 10/2/2020.      Review of Systems   Constitutional: Negative.  Negative for chills, fever and malaise/fatigue.   HENT: Negative.  Negative for sore throat.    Eyes: Negative.    Respiratory: Negative.  Negative for cough, hemoptysis, sputum production, shortness of breath, wheezing and stridor.    Cardiovascular: Negative.  Negative for chest pain, palpitations, orthopnea, claudication, leg swelling and PND.   Gastrointestinal: Negative.    Genitourinary: Negative.    Musculoskeletal: Negative.    Skin: Negative.    Neurological: Negative.  Negative for dizziness, loss of consciousness and weakness.   Endo/Heme/Allergies: Negative.  Does not bruise/bleed easily.   All other systems reviewed and are negative.       Objective:   /80 (BP Location: Left arm, Patient Position: Sitting, BP Cuff Size: Adult)   Pulse 74   Resp 18   Ht 1.499 m (4' 11\")   Wt 54.4 kg (120 lb)   SpO2 93%   BMI 24.24 kg/m²     Physical Exam   Constitutional: She appears well-developed and well-nourished. No distress.   HENT:   Head: Normocephalic and atraumatic.   Right Ear: External ear normal.   Left Ear: External ear normal.   Nose: Nose normal.   Mouth/Throat: No oropharyngeal exudate.   Eyes: Pupils are equal, round, and reactive to light. Conjunctivae and EOM are normal. Right eye exhibits no discharge. Left eye exhibits no discharge. No scleral icterus.   Neck: Neck supple. No JVD present.   Cardiovascular: Normal rate, regular rhythm and intact distal pulses. Exam reveals no gallop and no friction rub.   Murmur heard.   Systolic murmur is present with a grade of 2/6.  1+ LE edema     Pulmonary/Chest: Effort normal. No stridor. No " respiratory distress. She has no wheezes. She has no rales. She exhibits no tenderness.   Abdominal: Soft. She exhibits no distension. There is no guarding.   Musculoskeletal: Normal range of motion.         General: No tenderness, deformity or edema.   Neurological: She is alert. She has normal reflexes. She displays normal reflexes. No cranial nerve deficit. She exhibits normal muscle tone. Coordination normal.   Skin: Skin is warm and dry. No rash noted. She is not diaphoretic. No erythema. No pallor.   Psychiatric: She has a normal mood and affect. Her behavior is normal. Judgment and thought content normal.   Nursing note and vitals reviewed.  Echocardiogram: Dated 9/11/2020 personally interpreted myself showing some degree of aortic valve disease with an EF approximately 40%.  PASP elevated at 100 mmHg.    Echocardiogram: Dated 6/11/2019 personally interpreted myself showing an EF of 45% with severe pulmonary hypertension.    Lab Results   Component Value Date/Time    CHOLSTRLTOT 101 06/11/2019 01:28 PM    LDL 49 06/11/2019 01:28 PM    HDL 39 (A) 06/11/2019 01:28 PM    TRIGLYCERIDE 66 06/11/2019 01:28 PM       Lab Results   Component Value Date/Time    SODIUM 136 09/14/2020 06:59 AM    POTASSIUM 4.1 09/14/2020 06:59 AM    CHLORIDE 101 09/14/2020 06:59 AM    CO2 24 09/14/2020 06:59 AM    GLUCOSE 144 (H) 09/14/2020 06:59 AM    BUN 28 (H) 09/14/2020 06:59 AM    CREATININE 1.11 09/14/2020 06:59 AM     Lab Results   Component Value Date/Time    ALKPHOSPHAT 78 09/11/2020 05:44 AM    ASTSGOT 28 09/11/2020 05:44 AM    ALTSGPT 24 09/11/2020 05:44 AM    TBILIRUBIN 0.7 09/11/2020 05:44 AM          Assessment:     1. Chronic atrial fibrillation (HCC)     2. Chronic systolic congestive heart failure (HCC)  spironolactone (ALDACTONE) 25 MG Tab   3. Coronary artery disease involving native coronary artery of native heart without angina pectoris     4. Aortic atherosclerosis (HCC)  proBrain Natriuretic Peptide, NT   5. JOSE C  (acute kidney injury) (HCC)  spironolactone (ALDACTONE) 25 MG Tab    furosemide (LASIX) 20 MG Tab    proBrain Natriuretic Peptide, NT   6. Acute systolic congestive heart failure, NYHA class 2 (HCC)  spironolactone (ALDACTONE) 25 MG Tab    furosemide (LASIX) 20 MG Tab    proBrain Natriuretic Peptide, NT    CBC W/ DIFF W/O PLATELETS    EC-ECHOCARDIOGRAM COMPLETE W/O CONT   7. Acute renal failure superimposed on stage 3b chronic kidney disease, unspecified acute renal failure type (HCC)  Comp Metabolic Panel    CBC W/ DIFF W/O PLATELETS    EC-ECHOCARDIOGRAM COMPLETE W/O CONT   8. Acute respiratory failure with hypoxia and hypercapnia (HCC)  furosemide (LASIX) 20 MG Tab    Comp Metabolic Panel    EC-ECHOCARDIOGRAM COMPLETE W/O CONT   9. Essential hypertension  spironolactone (ALDACTONE) 25 MG Tab    EC-ECHOCARDIOGRAM COMPLETE W/O CONT   10. Hyperlipidemia, unspecified hyperlipidemia type  spironolactone (ALDACTONE) 25 MG Tab    CBC W/ DIFF W/O PLATELETS   11. PAF (paroxysmal atrial fibrillation)-probably reason for Eliquis     12. Type 2 diabetes mellitus without complication, with long-term current use of insulin (HCC)  proBrain Natriuretic Peptide, NT    Comp Metabolic Panel    CBC W/ DIFF W/O PLATELETS   13. High risk medication use  proBrain Natriuretic Peptide, NT    Comp Metabolic Panel   14. Pulmonary hypertension (HCC)         Medical Decision Making:  Today's Assessment / Status / Plan:     85-year-old female with some degree aortic valve disease pulmonary hypertension volume overload lower extremity edema paroxysmal atrial fibrillation chronic kidney disease.  I have switched her spironolactone and Lasix from PRN to scheduled dosing.  I like to get her pulmonary pressures down significantly.  We will check labs today since she has a home health nurse coming.  We will repeat an echocardiogram in 4 weeks.  I would like her to see a nurse practitioner in 2 weeks to see how she is diuresing.    1. Aortic Valve  disease    - repeat TTE in 4 weeks    2. CHFrEF    - start alvaro 25    - start lasix 40 BID    - coreg 6.25 BID    - lisin 10    3. CKD    - labs today    4. Edema    - per above    5. A-fib    - cont coreg 6.25 BID    - cont eliquis 2.5    6. Severe PH, WHO 2,3    - per above    7. High Risk Meds    - labs today

## 2020-10-04 VITALS
SYSTOLIC BLOOD PRESSURE: 149 MMHG | OXYGEN SATURATION: 100 % | DIASTOLIC BLOOD PRESSURE: 86 MMHG | RESPIRATION RATE: 18 BRPM | TEMPERATURE: 98.7 F | HEART RATE: 62 BPM

## 2020-10-05 ENCOUNTER — HOME CARE VISIT (OUTPATIENT)
Dept: HOME HEALTH SERVICES | Facility: HOME HEALTHCARE | Age: 85
End: 2020-10-05
Payer: MEDICARE

## 2020-10-05 VITALS
RESPIRATION RATE: 18 BRPM | TEMPERATURE: 97.4 F | SYSTOLIC BLOOD PRESSURE: 128 MMHG | WEIGHT: 120 LBS | OXYGEN SATURATION: 92 % | HEART RATE: 71 BPM | BODY MASS INDEX: 24.24 KG/M2 | DIASTOLIC BLOOD PRESSURE: 64 MMHG

## 2020-10-05 DIAGNOSIS — J96.02 ACUTE RESPIRATORY FAILURE WITH HYPOXIA AND HYPERCAPNIA (HCC): ICD-10-CM

## 2020-10-05 DIAGNOSIS — J96.01 ACUTE RESPIRATORY FAILURE WITH HYPOXIA AND HYPERCAPNIA (HCC): ICD-10-CM

## 2020-10-05 DIAGNOSIS — N17.9 AKI (ACUTE KIDNEY INJURY) (HCC): ICD-10-CM

## 2020-10-05 DIAGNOSIS — I50.21 ACUTE SYSTOLIC CONGESTIVE HEART FAILURE, NYHA CLASS 2 (HCC): ICD-10-CM

## 2020-10-05 DIAGNOSIS — I10 ESSENTIAL HYPERTENSION: ICD-10-CM

## 2020-10-05 PROCEDURE — G0151 HHCP-SERV OF PT,EA 15 MIN: HCPCS

## 2020-10-05 ASSESSMENT — FIBROSIS 4 INDEX: FIB4 SCORE: 0.91

## 2020-10-06 ENCOUNTER — HOME CARE VISIT (OUTPATIENT)
Dept: HOME HEALTH SERVICES | Facility: HOME HEALTHCARE | Age: 85
End: 2020-10-06
Payer: MEDICARE

## 2020-10-06 DIAGNOSIS — N17.9 AKI (ACUTE KIDNEY INJURY) (HCC): ICD-10-CM

## 2020-10-06 DIAGNOSIS — J96.01 ACUTE RESPIRATORY FAILURE WITH HYPOXIA AND HYPERCAPNIA (HCC): ICD-10-CM

## 2020-10-06 DIAGNOSIS — I50.21 ACUTE SYSTOLIC CONGESTIVE HEART FAILURE, NYHA CLASS 2 (HCC): ICD-10-CM

## 2020-10-06 DIAGNOSIS — J96.02 ACUTE RESPIRATORY FAILURE WITH HYPOXIA AND HYPERCAPNIA (HCC): ICD-10-CM

## 2020-10-06 DIAGNOSIS — I10 ESSENTIAL HYPERTENSION: ICD-10-CM

## 2020-10-06 RX ORDER — LISINOPRIL 10 MG/1
TABLET ORAL
Qty: 100 TAB | Refills: 3 | Status: SHIPPED | OUTPATIENT
Start: 2020-10-06 | End: 2020-11-24 | Stop reason: SDUPTHER

## 2020-10-06 RX ORDER — FUROSEMIDE 40 MG/1
40 TABLET ORAL 2 TIMES DAILY
Qty: 200 TAB | Refills: 3 | Status: SHIPPED | OUTPATIENT
Start: 2020-10-06 | End: 2020-11-24

## 2020-10-06 RX ORDER — LISINOPRIL 10 MG/1
10 TABLET ORAL DAILY
Qty: 30 TAB | Refills: 1 | Status: SHIPPED | OUTPATIENT
Start: 2020-10-06 | End: 2020-11-24

## 2020-10-06 RX ORDER — CARVEDILOL 6.25 MG/1
TABLET ORAL
Qty: 200 TAB | Refills: 3 | Status: ON HOLD
Start: 2020-10-06 | End: 2021-02-18

## 2020-10-06 RX ORDER — FUROSEMIDE 20 MG/1
TABLET ORAL
Qty: 30 TAB | Refills: 0 | OUTPATIENT
Start: 2020-10-06

## 2020-10-07 NOTE — PROGRESS NOTES
Just FYI:  Missed visit. Attempted to contact her multiple times with no return call. Diamond Riley RN,  aware.  Thank you, Isamar Braun RN, CM

## 2020-10-08 ENCOUNTER — HOME CARE VISIT (OUTPATIENT)
Dept: HOME HEALTH SERVICES | Facility: HOME HEALTHCARE | Age: 85
End: 2020-10-08
Payer: MEDICARE

## 2020-10-08 VITALS
HEART RATE: 68 BPM | TEMPERATURE: 98.7 F | RESPIRATION RATE: 18 BRPM | WEIGHT: 120 LBS | BODY MASS INDEX: 24.24 KG/M2 | SYSTOLIC BLOOD PRESSURE: 140 MMHG | DIASTOLIC BLOOD PRESSURE: 66 MMHG | OXYGEN SATURATION: 94 %

## 2020-10-08 PROCEDURE — G0493 RN CARE EA 15 MIN HH/HOSPICE: HCPCS

## 2020-10-08 ASSESSMENT — ENCOUNTER SYMPTOMS
VOMITING: DENIES
NAUSEA: DENIES

## 2020-10-08 ASSESSMENT — FIBROSIS 4 INDEX: FIB4 SCORE: 0.91

## 2020-10-12 ENCOUNTER — HOME CARE VISIT (OUTPATIENT)
Dept: HOME HEALTH SERVICES | Facility: HOME HEALTHCARE | Age: 85
End: 2020-10-12
Payer: MEDICARE

## 2020-10-12 PROCEDURE — G0155 HHCP-SVS OF CSW,EA 15 MIN: HCPCS

## 2020-10-13 ENCOUNTER — HOME CARE VISIT (OUTPATIENT)
Dept: HOME HEALTH SERVICES | Facility: HOME HEALTHCARE | Age: 85
End: 2020-10-13
Payer: MEDICARE

## 2020-10-13 VITALS
HEART RATE: 60 BPM | SYSTOLIC BLOOD PRESSURE: 112 MMHG | OXYGEN SATURATION: 96 % | TEMPERATURE: 98.7 F | RESPIRATION RATE: 18 BRPM | DIASTOLIC BLOOD PRESSURE: 64 MMHG

## 2020-10-13 PROCEDURE — G0493 RN CARE EA 15 MIN HH/HOSPICE: HCPCS

## 2020-10-13 ASSESSMENT — ACTIVITIES OF DAILY LIVING (ADL)
HOME_HEALTH_OASIS: 00
OASIS_M1830: 01

## 2020-10-13 ASSESSMENT — PATIENT HEALTH QUESTIONNAIRE - PHQ9: CLINICAL INTERPRETATION OF PHQ2 SCORE: 0

## 2020-10-15 ENCOUNTER — HOME CARE VISIT (OUTPATIENT)
Dept: HOME HEALTH SERVICES | Facility: HOME HEALTHCARE | Age: 85
End: 2020-10-15
Payer: MEDICARE

## 2020-10-15 NOTE — PROGRESS NOTES
FYI: Ms Drew be referred to the [facility] for continued care and follow up for CHF after discharge from [facility]. Thank you for allowing [facility] to serve your patients health care needs.

## 2020-11-10 ENCOUNTER — HOSPITAL ENCOUNTER (OUTPATIENT)
Dept: CARDIOLOGY | Facility: MEDICAL CENTER | Age: 85
End: 2020-11-10
Attending: INTERNAL MEDICINE
Payer: MEDICARE

## 2020-11-10 DIAGNOSIS — I10 ESSENTIAL HYPERTENSION: ICD-10-CM

## 2020-11-10 DIAGNOSIS — N18.32 ACUTE RENAL FAILURE SUPERIMPOSED ON STAGE 3B CHRONIC KIDNEY DISEASE, UNSPECIFIED ACUTE RENAL FAILURE TYPE (HCC): ICD-10-CM

## 2020-11-10 DIAGNOSIS — J96.01 ACUTE RESPIRATORY FAILURE WITH HYPOXIA AND HYPERCAPNIA (HCC): ICD-10-CM

## 2020-11-10 DIAGNOSIS — N17.9 ACUTE RENAL FAILURE SUPERIMPOSED ON STAGE 3B CHRONIC KIDNEY DISEASE, UNSPECIFIED ACUTE RENAL FAILURE TYPE (HCC): ICD-10-CM

## 2020-11-10 DIAGNOSIS — I50.21 ACUTE SYSTOLIC CONGESTIVE HEART FAILURE, NYHA CLASS 2 (HCC): ICD-10-CM

## 2020-11-10 DIAGNOSIS — J96.02 ACUTE RESPIRATORY FAILURE WITH HYPOXIA AND HYPERCAPNIA (HCC): ICD-10-CM

## 2020-11-10 LAB
LV EJECT FRACT  99904: 30
LV EJECT FRACT MOD 2C 99903: 31.43
LV EJECT FRACT MOD 4C 99902: 30.27
LV EJECT FRACT MOD BP 99901: 30.23

## 2020-11-10 PROCEDURE — 93306 TTE W/DOPPLER COMPLETE: CPT | Mod: 26 | Performed by: INTERNAL MEDICINE

## 2020-11-10 PROCEDURE — 93306 TTE W/DOPPLER COMPLETE: CPT

## 2020-11-24 ENCOUNTER — OFFICE VISIT (OUTPATIENT)
Dept: CARDIOLOGY | Facility: MEDICAL CENTER | Age: 85
End: 2020-11-24
Payer: MEDICARE

## 2020-11-24 VITALS
SYSTOLIC BLOOD PRESSURE: 180 MMHG | HEART RATE: 71 BPM | RESPIRATION RATE: 18 BRPM | WEIGHT: 124 LBS | HEIGHT: 59 IN | DIASTOLIC BLOOD PRESSURE: 70 MMHG | OXYGEN SATURATION: 90 % | BODY MASS INDEX: 25 KG/M2

## 2020-11-24 DIAGNOSIS — I50.21 ACUTE SYSTOLIC CONGESTIVE HEART FAILURE, NYHA CLASS 2 (HCC): ICD-10-CM

## 2020-11-24 DIAGNOSIS — I35.0 AORTIC VALVE STENOSIS, ETIOLOGY OF CARDIAC VALVE DISEASE UNSPECIFIED: ICD-10-CM

## 2020-11-24 DIAGNOSIS — I25.10 CORONARY ARTERY DISEASE INVOLVING NATIVE CORONARY ARTERY OF NATIVE HEART WITHOUT ANGINA PECTORIS: ICD-10-CM

## 2020-11-24 DIAGNOSIS — N17.9 AKI (ACUTE KIDNEY INJURY) (HCC): ICD-10-CM

## 2020-11-24 DIAGNOSIS — J96.02 ACUTE RESPIRATORY FAILURE WITH HYPOXIA AND HYPERCAPNIA (HCC): ICD-10-CM

## 2020-11-24 DIAGNOSIS — I27.20 PULMONARY HYPERTENSION (HCC): ICD-10-CM

## 2020-11-24 DIAGNOSIS — I10 HTN (HYPERTENSION), MALIGNANT: ICD-10-CM

## 2020-11-24 DIAGNOSIS — Z79.899 HIGH RISK MEDICATION USE: ICD-10-CM

## 2020-11-24 DIAGNOSIS — J96.01 ACUTE RESPIRATORY FAILURE WITH HYPOXIA AND HYPERCAPNIA (HCC): ICD-10-CM

## 2020-11-24 DIAGNOSIS — I48.0 PAF (PAROXYSMAL ATRIAL FIBRILLATION) (HCC): ICD-10-CM

## 2020-11-24 DIAGNOSIS — I50.20 ACC/AHA STAGE C SYSTOLIC HEART FAILURE (HCC): ICD-10-CM

## 2020-11-24 PROCEDURE — 99214 OFFICE O/P EST MOD 30 MIN: CPT | Performed by: NURSE PRACTITIONER

## 2020-11-24 RX ORDER — FUROSEMIDE 80 MG
80 TABLET ORAL 2 TIMES DAILY
Qty: 180 TAB | Refills: 3 | Status: SHIPPED
Start: 2020-11-24 | End: 2021-01-06 | Stop reason: CLARIF

## 2020-11-24 RX ORDER — LISINOPRIL 20 MG/1
20 TABLET ORAL
Qty: 90 TAB | Refills: 3 | Status: SHIPPED | OUTPATIENT
Start: 2020-11-24

## 2020-11-24 ASSESSMENT — ENCOUNTER SYMPTOMS
ABDOMINAL PAIN: 0
SHORTNESS OF BREATH: 1
ORTHOPNEA: 0
MYALGIAS: 0
FEVER: 0
PALPITATIONS: 0
DIZZINESS: 0
PND: 0
COUGH: 0
CLAUDICATION: 0

## 2020-11-24 ASSESSMENT — FIBROSIS 4 INDEX: FIB4 SCORE: 0.91

## 2020-11-24 NOTE — PROGRESS NOTES
"Chief Complaint   Patient presents with   • Congestive Heart Failure       Subjective:   Robyn Torres is a 85 y.o. female who presents today for follow-up on her valvular heart disease, pulmonary hypertension and edema with her son, Dank.    Patient of Dr. Ruiz.  She was last seen in clinic on 10/2/2020.  During that visit, her diuretics were changed from as needed to scheduled. She was also sent for an Echo.     Patient reports taking her medications regularly, but states she continues to have lower extremity edema and shortness of breath with walking.  She reports being tired with her chores.    Her weights remain between 120-125 pounds.    She denies chest pain, palpitations, orthopnea, PND or dizziness/lightheadedness.    She uses a walker for ambulation.    She lives alone and continues to do her activities of daily living independently.    She reports taking her blood pressures occasionally at home, but does not remember what they are.    Additonally, patient has the following medical problems:    -Hypertension    -Valvular heart disease, aortic stenosis    -Coronary artery disease, CABG in 2004    -Atrial fibrillation: Taking Eliquis    -Pulmonary hypertension    -questionable TIA in the past.    Past Medical History:   Diagnosis Date   • CAD (coronary artery disease)    • CATARACT    • CHF (congestive heart failure) ([facility])    • Cystocele    • Diabetes     insulin   • DJD (degenerative joint disease)    • HTN (hypertension), benign    • Hyperlipidemia    • Hypertension    • Medical home    • Myocardial infarct ([facility]) 2000   • PVD (peripheral vascular disease) ([facility])    • Renal disorder     \"watching my kidneys\"     • Unspecified urinary incontinence    • Urinary bladder disorder      Past Surgical History:   Procedure Laterality Date   • CATARACT PHACO WITH IOL  3/12/2014    Performed by Murtaza Casey M.D. at SURGERY SAME DAY [facility] ORS   • MULTIPLE CORONARY ARTERY BYPASS  2004   • ABDOMINAL " HYSTERECTOMY TOTAL       Family History   Problem Relation Age of Onset   • Hypertension Mother    • Heart Disease Mother    • No Known Problems Maternal Grandmother    • No Known Problems Maternal Grandfather    • Diabetes Daughter    • Seizures Son    • No Known Problems Son    • Heart Disease Son      Social History     Socioeconomic History   • Marital status:      Spouse name: Not on file   • Number of children: Not on file   • Years of education: Not on file   • Highest education level: Not on file   Occupational History   • Not on file   Social Needs   • Financial resource strain: Not hard at all   • Food insecurity     Worry: Never true     Inability: Never true   • Transportation needs     Medical: No     Non-medical: No   Tobacco Use   • Smoking status: Never Smoker   • Smokeless tobacco: Never Used   Substance and Sexual Activity   • Alcohol use: No   • Drug use: No   • Sexual activity: Never     Partners: Male   Lifestyle   • Physical activity     Days per week: Not on file     Minutes per session: Not on file   • Stress: Not on file   Relationships   • Social connections     Talks on phone: Not on file     Gets together: Not on file     Attends [employer] service: Not on file     Active member of club or organization: Not on file     Attends meetings of clubs or organizations: Not on file     Relationship status: Not on file   • Intimate partner violence     Fear of current or ex partner: Not on file     Emotionally abused: Not on file     Physically abused: Not on file     Forced sexual activity: Not on file   Other Topics Concern   • Not on file   Social History Narrative   • Not on file     Allergies   Allergen Reactions   • Tetracaine Hcl [Tetcaine]    • Vancomycin    • Vigamox [Moxifloxacin]      Outpatient Encounter Medications as of 11/24/2020   Medication Sig Dispense Refill   • furosemide (LASIX) 80 MG Tab Take 1 Tab by mouth 2 times a day. 180 Tab 3   • lisinopril (PRINIVIL) 20 MG Tab  "Take 1 Tab by mouth every day. 90 Tab 3   • carvedilol (COREG) 6.25 MG Tab TAKE ONE TABLET BY MOUTH TWICE A DAY WITH MEALS 200 Tab 3   • spironolactone (ALDACTONE) 25 MG Tab Take 1 Tab by mouth 1 time daily as needed. 30 Tab 11   • atorvastatin (LIPITOR) 80 MG tablet Take 1 Tab by mouth every evening. 30 Tab 0   • apixaban (ELIQUIS) 2.5mg Tab Take 1 Tab by mouth 2 Times a Day. 60 Tab 0   • Multiple Vitamins-Minerals (MULTI COMPLETE PO) Take  by mouth.     • [DISCONTINUED] lisinopril (PRINIVIL) 10 MG Tab TAKE ONE TABLET BY MOUTH EVERY  Tab 3   • [DISCONTINUED] lisinopril (PRINIVIL) 10 MG Tab Take 1 Tab by mouth every day. (Patient not taking: Reported on 11/24/2020) 30 Tab 1   • [DISCONTINUED] furosemide (LASIX) 40 MG Tab Take 1 Tab by mouth 2 times a day. 200 Tab 3     No facility-administered encounter medications on file as of 11/24/2020.      Review of Systems   Constitutional: Negative for fever and malaise/fatigue.   Respiratory: Positive for shortness of breath. Negative for cough.    Cardiovascular: Positive for leg swelling. Negative for chest pain, palpitations, orthopnea, claudication and PND.   Gastrointestinal: Negative for abdominal pain.   Musculoskeletal: Negative for myalgias.   Neurological: Negative for dizziness.   All other systems reviewed and are negative.       Objective:   BP (!) 180/70 (BP Location: Left arm, Patient Position: Sitting, BP Cuff Size: Adult)   Pulse 71   Resp 18   Ht 1.499 m (4' 11\")   Wt 56.2 kg (124 lb)   SpO2 90%   BMI 25.04 kg/m²     Physical Exam   Constitutional: She is oriented to person, place, and time. She appears well-developed and well-nourished.   Uses a walker for ambulation    HENT:   Head: Normocephalic and atraumatic.   Eyes: Pupils are equal, round, and reactive to light. EOM are normal.   Neck: Normal range of motion. Neck supple. JVD present.   Cardiovascular: Normal rate, regular rhythm and normal heart sounds.   Pulmonary/Chest: Effort " normal. No respiratory distress. She has no wheezes. She has rales (Bilateral Lower lobes ).   Abdominal: Soft. Bowel sounds are normal.   Musculoskeletal:         General: Edema (Bilateral 2-3+ LE edema up to her Thighs R>L) present.   Neurological: She is alert and oriented to person, place, and time.   Skin: Skin is warm and dry.   Psychiatric: She has a normal mood and affect. Her behavior is normal.   Vitals reviewed.    Lab Results   Component Value Date/Time    CHOLSTRLTOT 101 06/11/2019 01:28 PM    LDL 49 06/11/2019 01:28 PM    HDL 39 (A) 06/11/2019 01:28 PM    TRIGLYCERIDE 66 06/11/2019 01:28 PM       Lab Results   Component Value Date/Time    SODIUM 135 10/02/2020 12:45 PM    SODIUM 133 (L) 10/02/2020 12:45 PM    POTASSIUM 4.4 10/02/2020 12:45 PM    POTASSIUM 4.2 10/02/2020 12:45 PM    CHLORIDE 100 10/02/2020 12:45 PM    CHLORIDE 98 10/02/2020 12:45 PM    CO2 24 10/02/2020 12:45 PM    CO2 23 10/02/2020 12:45 PM    GLUCOSE 150 (H) 10/02/2020 12:45 PM    GLUCOSE 152 (H) 10/02/2020 12:45 PM    BUN 17 10/02/2020 12:45 PM    BUN 17 10/02/2020 12:45 PM    CREATININE 0.99 10/02/2020 12:45 PM    CREATININE 0.99 10/02/2020 12:45 PM     Lab Results   Component Value Date/Time    ALKPHOSPHAT 106 (H) 10/02/2020 12:45 PM    ALKPHOSPHAT 107 (H) 10/02/2020 12:45 PM    ASTSGOT 18 10/02/2020 12:45 PM    ASTSGOT 14 10/02/2020 12:45 PM    ALTSGPT 12 10/02/2020 12:45 PM    ALTSGPT 12 10/02/2020 12:45 PM    TBILIRUBIN 0.6 10/02/2020 12:45 PM    TBILIRUBIN 0.6 10/02/2020 12:45 PM      Echocardiogram 4/3/2014  CONCLUSIONS   Akinesis of the mid to distal inferior wall/inferoseptum, apex.   Moderately dilated left atrium. Estrellita 39 ml   Left ventricular ejection fraction is 50%  .Normal left ventricular   size.   Mild to moderate mitral regurgitation.   Right ventricular systolic pressure is estimated to be 40-45 mmHg.   No prior to compare     Transthoracic Echo Report 10/6/2014  Technically difficult study, Mild global  hypokinesis.   Left ventricular ejection fraction is 45%.   Grade II diastolic dysfunction.   Moderately increased LA area.   Moderate mitral regurgitation.   Mitral annular calcification.   The aortic valve is not well visualized.   Aortic sclerosis without stenosis.   Trace aortic insufficiency.   Right ventricular systolic pressure is estimated to be 54-59 mmHg.   Consistent with moderate pulmonary hypertension.     Transthoracic Echo Report 3/30/2016  Left ventricle is mildly dilated. Normal left ventricular wall   thickness. Reduced left ventricular systolic function. Left ventricular ejection fraction is visually estimated to be 35%. Global hypokinesis   with regional variation. Unable to assess for diastolic dysfunction.  Estimated right ventricular systolic pressure  is 55 mmHg.  No significant valve abnormalities.   Compared to the images of the prior study done -  there has been no significant change.      Transthoracic Echo Report 2/8/2017  Prior echo - 03/30/16Normal left ventricular chamber size.  Mild concentric left ventricular hypertrophy.  Reduced left ventricular systolic function.  Left ventricular ejection fraction is visually estimated to be 45%.  Hypokinetic apex.  Grade I diastolic dysfunction.  Mitral annular calcification.  Calcification of the mitral valve leaflets.  No mitral stenosis.  Trace mitral regurgitation.  Structurally normal aortic valve.  Aortic sclerosis without stenosis.  Structurally normal tricuspid valve without significant stenosis.  Mild tricuspid regurgitation.  Estimated right ventricular systolic pressure  is 35 mmHg.  Normal pericardium without effusion.     Transthoracic Echo Report 6/11/2019  Mildly reduced left ventricular systolic function.  Left ventricular ejection fraction is visually estimated to be 45%.  Hypokinesis/akinesis of the left ventricular apex and the mid to distal septal wall segments.  Thrombus is not observed in the left ventricular apex.  Aortic  sclerosis without stenosis.  Mild mitral regurgitation.  Right heart pressures are consistent with severe pulmonary   hypertension.     Transthoracic Echo Report 9/11/2020  Prior echocardiogram 6/11/2019.  Left ventricular ejection fraction is visually estimated to be 40%.  There is basal hyperkinesis with apical hypokinesis.  By the continuity equation, the MEGA is 0.8 cm2.  Consider low flow, low gradient severe aortic stenosis.  Estimated right ventricular systolic pressure  is 100 mmHg.  Right heart pressures are consistent with severe pulmonary   hypertension.     Transthoracic Echo Report 11/10/2020-reviewed results   Prior Echo 9/11/2020  Moderate aortic stenosis.  No aortic insufficiency.  Left ventricular ejection fraction is visually estimated to be 30%.  Moderate tricuspid regurgitation.  Estimated right ventricular systolic pressure  is 85 mmHg.      Assessment:     1. ACC/AHA stage C systolic heart failure (HCC)     2. Acute systolic congestive heart failure, NYHA class 2 (HCC)  furosemide (LASIX) 80 MG Tab    lisinopril (PRINIVIL) 20 MG Tab   3. JOSE C (acute kidney injury) (HCC)  furosemide (LASIX) 80 MG Tab   4. Acute respiratory failure with hypoxia and hypercapnia (HCC)  furosemide (LASIX) 80 MG Tab   5. PAF (paroxysmal atrial fibrillation)-probably reason for Eliquis  Basic Metabolic Panel   6. High risk medication use  Basic Metabolic Panel   7. Aortic valve stenosis, etiology of cardiac valve disease unspecified     8. HTN (hypertension), malignant     9. Coronary artery disease involving native coronary artery of native heart without angina pectoris     10. Pulmonary hypertension (HCC)         Medical Decision Making:  Today's Assessment / Status / Plan:   1. HFrEF, Stage C, Class 2-3, LVEF 30% was at 40%: Patient is volume overloaded  -Heart failure likely due to aortic stenosis  -Pt also has elevated pulmonary pressures  -Will attempt to diurese her before sending her for further imaging studies  (dobutamine stress test)  -Increase furosemide to 80 mg twice a day  -Increase lisinopril to 20 mg daily  -Continue carvedilol 6.25 mg twice a day  -Continue spironolactone 25 mg daily  -BMP in 1 week (will follow kidney function carefully)  -If EF does not improve within the next 3 months or so, to consider ICD for primary prevention if patient is a candidate  -Reinforced s/sx of worsening heart failure with patient and weight monitoring. Pt verbalizes understanding. Pt to call office or RTC if present.   -If weights or blood pressure do not improve, patient to contact our office    2.  Hypertension:  -BP elevated in the office, BP on recheck was 180/80  -Increase lisinopril to 20 mg daily  -Discussed low-sodium diet  -Continue carvedilol, furosemide and spironolactone per above  -Monitor and log Blood pressures at home.  Take blood pressures 2 hours after medications.  Call office or RTC if BP increasing or >180/100 or if symptoms of elevated blood pressure present. Reviewed s/sx of stroke and heart attack.  Patient does have a questionable history of a TIA.  Patient to go to ER or call 911 if present.     3.  Aortic stenosis: Moderate per recent echo  -Will plan for dobutamine stress test in the future  -Will attempt to diurese patient more  -May need a referral over to the valve clinic    4.  Pulmonary HTN, WHO group 2-3:   -recommendations per above     5. Atrial fibrillation:  -Continue Eliquis 2.5 mg twice a day    6.  CAD, history of CABG in '04:  -Last LDL 49 on 6/11/2019  -Continue atorvastatin 80 mg daily    FU in clinic in 2 weeks with labs in 1 week. Sooner if needed.    Patient verbalizes understanding and agrees with the plan of care.     PLEASE NOTE: This Note was created using voice recognition Software. I have made every reasonable attempt to correct obvious errors, but I expect that there are errors of grammar and possibly content that I did not discover before finalizing the note

## 2020-11-24 NOTE — PATIENT INSTRUCTIONS
Increase Furosemide to 80 mg twice a day   Increase lisinopril to 20 mg daily  BMP labs in 1 week   Call out clinic if worsening symptoms   Document daily weights and blood pressures 2 hours after meds, twice a day if able

## 2020-12-02 ENCOUNTER — HOSPITAL ENCOUNTER (OUTPATIENT)
Facility: MEDICAL CENTER | Age: 85
End: 2020-12-02
Attending: NURSE PRACTITIONER
Payer: MEDICARE

## 2020-12-02 DIAGNOSIS — I48.0 PAF (PAROXYSMAL ATRIAL FIBRILLATION) (HCC): ICD-10-CM

## 2020-12-02 DIAGNOSIS — Z79.899 HIGH RISK MEDICATION USE: ICD-10-CM

## 2020-12-02 LAB
ANION GAP SERPL CALC-SCNC: 11 MMOL/L (ref 7–16)
BUN SERPL-MCNC: 25 MG/DL (ref 8–22)
CALCIUM SERPL-MCNC: 9.3 MG/DL (ref 8.5–10.5)
CHLORIDE SERPL-SCNC: 94 MMOL/L (ref 96–112)
CO2 SERPL-SCNC: 28 MMOL/L (ref 20–33)
CREAT SERPL-MCNC: 0.98 MG/DL (ref 0.5–1.4)
GLUCOSE SERPL-MCNC: 203 MG/DL (ref 65–99)
POTASSIUM SERPL-SCNC: 4.4 MMOL/L (ref 3.6–5.5)
SODIUM SERPL-SCNC: 133 MMOL/L (ref 135–145)

## 2020-12-02 PROCEDURE — 80048 BASIC METABOLIC PNL TOTAL CA: CPT

## 2020-12-03 ENCOUNTER — TELEPHONE (OUTPATIENT)
Dept: CARDIOLOGY | Facility: MEDICAL CENTER | Age: 85
End: 2020-12-03

## 2020-12-03 NOTE — TELEPHONE ENCOUNTER
----- Message from BETHEL Lamar sent at 12/3/2020 11:43 AM PST -----  Labs look fairly stable. Pt should have a follow up visit if possible in the next week

## 2021-01-06 ENCOUNTER — OFFICE VISIT (OUTPATIENT)
Dept: CARDIOLOGY | Facility: MEDICAL CENTER | Age: 86
End: 2021-01-06
Payer: MEDICARE

## 2021-01-06 VITALS
HEIGHT: 59 IN | HEART RATE: 63 BPM | SYSTOLIC BLOOD PRESSURE: 144 MMHG | DIASTOLIC BLOOD PRESSURE: 80 MMHG | WEIGHT: 123.8 LBS | BODY MASS INDEX: 24.96 KG/M2 | OXYGEN SATURATION: 90 %

## 2021-01-06 DIAGNOSIS — E78.5 HYPERLIPIDEMIA, UNSPECIFIED HYPERLIPIDEMIA TYPE: ICD-10-CM

## 2021-01-06 DIAGNOSIS — I10 HTN (HYPERTENSION), MALIGNANT: ICD-10-CM

## 2021-01-06 DIAGNOSIS — I27.20 PULMONARY HYPERTENSION (HCC): ICD-10-CM

## 2021-01-06 DIAGNOSIS — Z79.899 HIGH RISK MEDICATION USE: ICD-10-CM

## 2021-01-06 DIAGNOSIS — I50.20 ACC/AHA STAGE C SYSTOLIC HEART FAILURE (HCC): ICD-10-CM

## 2021-01-06 DIAGNOSIS — I35.0 AORTIC VALVE STENOSIS, ETIOLOGY OF CARDIAC VALVE DISEASE UNSPECIFIED: ICD-10-CM

## 2021-01-06 DIAGNOSIS — N17.9 AKI (ACUTE KIDNEY INJURY) (HCC): ICD-10-CM

## 2021-01-06 DIAGNOSIS — I25.10 CORONARY ARTERY DISEASE INVOLVING NATIVE CORONARY ARTERY OF NATIVE HEART WITHOUT ANGINA PECTORIS: ICD-10-CM

## 2021-01-06 DIAGNOSIS — I48.20 CHRONIC ATRIAL FIBRILLATION (HCC): ICD-10-CM

## 2021-01-06 DIAGNOSIS — I50.21 ACUTE SYSTOLIC CONGESTIVE HEART FAILURE, NYHA CLASS 2 (HCC): ICD-10-CM

## 2021-01-06 PROCEDURE — 99214 OFFICE O/P EST MOD 30 MIN: CPT | Performed by: NURSE PRACTITIONER

## 2021-01-06 RX ORDER — SPIRONOLACTONE 25 MG/1
25 TABLET ORAL DAILY
Qty: 30 TAB | Refills: 11 | Status: ON HOLD
Start: 2021-01-06 | End: 2021-02-18

## 2021-01-06 RX ORDER — ATORVASTATIN CALCIUM 80 MG/1
80 TABLET, FILM COATED ORAL EVERY EVENING
Qty: 100 TAB | Refills: 3 | Status: SHIPPED | OUTPATIENT
Start: 2021-01-06

## 2021-01-06 RX ORDER — TORSEMIDE 100 MG/1
100 TABLET ORAL DAILY
Qty: 30 TAB | Refills: 3 | Status: ON HOLD
Start: 2021-01-06 | End: 2021-02-18

## 2021-01-06 ASSESSMENT — ENCOUNTER SYMPTOMS
PALPITATIONS: 0
DIZZINESS: 0
ORTHOPNEA: 0
SHORTNESS OF BREATH: 1
COUGH: 0
PND: 0
ABDOMINAL PAIN: 0
FEVER: 0
CLAUDICATION: 0
MYALGIAS: 0

## 2021-01-06 ASSESSMENT — FIBROSIS 4 INDEX: FIB4 SCORE: 0.91

## 2021-01-06 NOTE — PROGRESS NOTES
Chief Complaint   Patient presents with   • Congestive Heart Failure       Subjective:   Robyn Torres is a 85 y.o. female who presents today for follow-up on her valvular heart disease, pulmonary hypertension and edema with her son, Dank.    Patient of Dr. Ruiz.  She was last seen in clinic on 11/24/2020.  During that visit, furosemide was increased to 80 mg twice a day and lisinopril was increased to 20 mg daily.  Patient reports no problems with her medication changes.      Patient was recommended to follow-up in 2 weeks, but they were not able to get back in until today for various reasons.    Patient reports her lower extremity edema did improve with the med changes, but states she recently has worsening lower extremity edema.    She does continue to have shortness of breath with exertion, but otherwise denies chest pain, palpitations, orthopnea, PND or dizziness/lightheadedness.  She also reporting fatigue.    Her home weights are around 120 pounds.    Patient did start using a peddling machine the last 2 days for exercise.  She states she has exercised up to 30 minutes.    Patient does admit to eating top [employer]en every day, but otherwise gets prepared meals delivered.  Her son is trying to get her to stop eating top Ramen due to the high amount of sodium.    She uses a walker for ambulation.    She lives alone and continues to do her activities of daily living independently.    She reports taking her blood pressures occasionally at home, but does not remember what they are.  It did not bring in a blood pressure log today.    Additonally, patient has the following medical problems:    -Hypertension    -Valvular heart disease, aortic stenosis    -Coronary artery disease, CABG in 2004    -Atrial fibrillation: Taking Eliquis    -Pulmonary hypertension    -questionable TIA in the past.    Past Medical History:   Diagnosis Date   • CAD (coronary artery disease)    • CATARACT    • CHF (congestive heart failure)  "(Formerly [facility])    • Cystocele    • Diabetes     insulin   • DJD (degenerative joint disease)    • HTN (hypertension), benign    • Hyperlipidemia    • Hypertension    • Medical home    • Myocardial infarct (Formerly [facility] [place]) 2000   • PVD (peripheral vascular disease) (Formerly [facility] [place])    • Renal disorder     \"watching my kidneys\"     • Unspecified urinary incontinence    • Urinary bladder disorder      Past Surgical History:   Procedure Laterality Date   • CATARACT PHACO WITH IOL  3/12/2014    Performed by Murtaza Casey M.D. at [facility]   • MULTIPLE CORONARY ARTERY BYPASS  2004   • ABDOMINAL HYSTERECTOMY TOTAL       Family History   Problem Relation Age of Onset   • Hypertension Mother    • Heart Disease Mother    • No Known Problems Maternal Grandmother    • No Known Problems Maternal Grandfather    • Diabetes Daughter    • Seizures Son    • No Known Problems Son    • Heart Disease Son      Social History     Socioeconomic History   • Marital status:      Spouse name: Not on file   • Number of children: Not on file   • Years of education: Not on file   • Highest education level: Not on file   Occupational History   • Not on file   Social Needs   • Financial resource strain: Not hard at all   • Food insecurity     Worry: Never true     Inability: Never true   • Transportation needs     Medical: No     Non-medical: No   Tobacco Use   • Smoking status: Never Smoker   • Smokeless tobacco: Never Used   Substance and Sexual Activity   • Alcohol use: No   • Drug use: No   • Sexual activity: Never     Partners: Male   Lifestyle   • Physical activity     Days per week: Not on file     Minutes per session: Not on file   • Stress: Not on file   Relationships   • Social connections     Talks on phone: Not on file     Gets together: Not on file     Attends [employer] witness service: Not on file     Active member of club or organization: Not on file     Attends meetings of clubs or organizations: Not on file     Relationship status: Not on " file   • Intimate partner violence     Fear of current or ex partner: Not on file     Emotionally abused: Not on file     Physically abused: Not on file     Forced sexual activity: Not on file   Other Topics Concern   • Not on file   Social History Narrative   • Not on file     Allergies   Allergen Reactions   • Tetracaine Hcl [Tetcaine]    • Vancomycin    • Vigamox [Moxifloxacin]      Outpatient Encounter Medications as of 1/6/2021   Medication Sig Dispense Refill   • spironolactone (ALDACTONE) 25 MG Tab Take 1 Tab by mouth every day. 30 Tab 11   • torsemide (DEMADEX) 100 MG Tab Take 1 Tab by mouth every day. 30 Tab 3   • atorvastatin (LIPITOR) 80 MG tablet Take 1 Tab by mouth every evening. 100 Tab 3   • apixaban (ELIQUIS) 2.5mg Tab Take 1 Tab by mouth 2 Times a Day. 200 Tab 3   • lisinopril (PRINIVIL) 20 MG Tab Take 1 Tab by mouth every day. 90 Tab 3   • carvedilol (COREG) 6.25 MG Tab TAKE ONE TABLET BY MOUTH TWICE A DAY WITH MEALS 200 Tab 3   • Multiple Vitamins-Minerals (MULTI COMPLETE PO) Take  by mouth.     • [DISCONTINUED] furosemide (LASIX) 80 MG Tab Take 1 Tab by mouth 2 times a day. 180 Tab 3   • [DISCONTINUED] spironolactone (ALDACTONE) 25 MG Tab Take 1 Tab by mouth 1 time daily as needed. 30 Tab 11   • [DISCONTINUED] atorvastatin (LIPITOR) 80 MG tablet Take 1 Tab by mouth every evening. 30 Tab 0   • [DISCONTINUED] apixaban (ELIQUIS) 2.5mg Tab Take 1 Tab by mouth 2 Times a Day. 60 Tab 0     No facility-administered encounter medications on file as of 1/6/2021.      Review of Systems   Constitutional: Positive for malaise/fatigue. Negative for fever.   Respiratory: Positive for shortness of breath. Negative for cough.    Cardiovascular: Positive for leg swelling. Negative for chest pain, palpitations, orthopnea, claudication and PND.   Gastrointestinal: Negative for abdominal pain.   Musculoskeletal: Negative for myalgias.   Neurological: Negative for dizziness.   All other systems reviewed and are  "negative.       Objective:   /80 (BP Location: Left arm, Patient Position: Sitting, BP Cuff Size: Adult)   Pulse 63   Ht 1.499 m (4' 11\")   Wt 56.2 kg (123 lb 12.8 oz)   SpO2 90%   BMI 25.00 kg/m²     Physical Exam   Constitutional: She is oriented to person, place, and time. She appears well-developed and well-nourished.   Using a walker   HENT:   Head: Normocephalic and atraumatic.   Eyes: Pupils are equal, round, and reactive to light. EOM are normal.   Neck: Normal range of motion. Neck supple. JVD present.   Cardiovascular: Normal rate, regular rhythm and normal heart sounds.   Pulmonary/Chest: Effort normal and breath sounds normal. No respiratory distress. She has no wheezes. She has no rales.   Abdominal: Soft. Bowel sounds are normal.   Musculoskeletal:         General: Edema (Bilateral 2-3+ lower extremity edema) present.   Neurological: She is alert and oriented to person, place, and time.   Skin: Skin is warm and dry.   Psychiatric: She has a normal mood and affect. Her behavior is normal.   Vitals reviewed.    Lab Results   Component Value Date/Time    CHOLSTRLTOT 101 06/11/2019 01:28 PM    LDL 49 06/11/2019 01:28 PM    HDL 39 (A) 06/11/2019 01:28 PM    TRIGLYCERIDE 66 06/11/2019 01:28 PM       Lab Results   Component Value Date/Time    SODIUM 133 (L) 12/02/2020 12:25 PM    POTASSIUM 4.4 12/02/2020 12:25 PM    CHLORIDE 94 (L) 12/02/2020 12:25 PM    CO2 28 12/02/2020 12:25 PM    GLUCOSE 203 (H) 12/02/2020 12:25 PM    BUN 25 (H) 12/02/2020 12:25 PM    CREATININE 0.98 12/02/2020 12:25 PM     Lab Results   Component Value Date/Time    ALKPHOSPHAT 106 (H) 10/02/2020 12:45 PM    ALKPHOSPHAT 107 (H) 10/02/2020 12:45 PM    ASTSGOT 18 10/02/2020 12:45 PM    ASTSGOT 14 10/02/2020 12:45 PM    ALTSGPT 12 10/02/2020 12:45 PM    ALTSGPT 12 10/02/2020 12:45 PM    TBILIRUBIN 0.6 10/02/2020 12:45 PM    TBILIRUBIN 0.6 10/02/2020 12:45 PM      Echocardiogram 4/3/2014  CONCLUSIONS   Akinesis of the mid to distal " inferior wall/inferoseptum, apex.   Moderately dilated left atrium. Estrellita 39 ml   Left ventricular ejection fraction is 50%  .Normal left ventricular   size.   Mild to moderate mitral regurgitation.   Right ventricular systolic pressure is estimated to be 40-45 mmHg.   No prior to compare     Transthoracic Echo Report 10/6/2014  Technically difficult study, Mild global hypokinesis.   Left ventricular ejection fraction is 45%.   Grade II diastolic dysfunction.   Moderately increased LA area.   Moderate mitral regurgitation.   Mitral annular calcification.   The aortic valve is not well visualized.   Aortic sclerosis without stenosis.   Trace aortic insufficiency.   Right ventricular systolic pressure is estimated to be 54-59 mmHg.   Consistent with moderate pulmonary hypertension.     Transthoracic Echo Report 3/30/2016  Left ventricle is mildly dilated. Normal left ventricular wall   thickness. Reduced left ventricular systolic function. Left ventricular ejection fraction is visually estimated to be 35%. Global hypokinesis   with regional variation. Unable to assess for diastolic dysfunction.  Estimated right ventricular systolic pressure  is 55 mmHg.  No significant valve abnormalities.   Compared to the images of the prior study done -  there has been no significant change.      Transthoracic Echo Report 2/8/2017  Prior echo - 03/30/16Normal left ventricular chamber size.  Mild concentric left ventricular hypertrophy.  Reduced left ventricular systolic function.  Left ventricular ejection fraction is visually estimated to be 45%.  Hypokinetic apex.  Grade I diastolic dysfunction.  Mitral annular calcification.  Calcification of the mitral valve leaflets.  No mitral stenosis.  Trace mitral regurgitation.  Structurally normal aortic valve.  Aortic sclerosis without stenosis.  Structurally normal tricuspid valve without significant stenosis.  Mild tricuspid regurgitation.  Estimated right ventricular systolic pressure   is 35 mmHg.  Normal pericardium without effusion.     Transthoracic Echo Report 6/11/2019  Mildly reduced left ventricular systolic function.  Left ventricular ejection fraction is visually estimated to be 45%.  Hypokinesis/akinesis of the left ventricular apex and the mid to distal septal wall segments.  Thrombus is not observed in the left ventricular apex.  Aortic sclerosis without stenosis.  Mild mitral regurgitation.  Right heart pressures are consistent with severe pulmonary   hypertension.     Transthoracic Echo Report 9/11/2020  Prior echocardiogram 6/11/2019.  Left ventricular ejection fraction is visually estimated to be 40%.  There is basal hyperkinesis with apical hypokinesis.  By the continuity equation, the MEGA is 0.8 cm2.  Consider low flow, low gradient severe aortic stenosis.  Estimated right ventricular systolic pressure  is 100 mmHg.  Right heart pressures are consistent with severe pulmonary   hypertension.     Transthoracic Echo Report 11/10/2020  Prior Echo 9/11/2020  Moderate aortic stenosis.  No aortic insufficiency.  Left ventricular ejection fraction is visually estimated to be 30%.  Moderate tricuspid regurgitation.  Estimated right ventricular systolic pressure  is 85 mmHg.      Assessment:     1. JOSE C (acute kidney injury) (HCC)     2. Acute systolic congestive heart failure, NYHA class 2 (HCC)     3. Hyperlipidemia, unspecified hyperlipidemia type  spironolactone (ALDACTONE) 25 MG Tab    torsemide (DEMADEX) 100 MG Tab    atorvastatin (LIPITOR) 80 MG tablet   4. ACC/AHA stage C systolic heart failure (HCC)  spironolactone (ALDACTONE) 25 MG Tab    torsemide (DEMADEX) 100 MG Tab    Basic Metabolic Panel   5. High risk medication use  spironolactone (ALDACTONE) 25 MG Tab    torsemide (DEMADEX) 100 MG Tab    Basic Metabolic Panel   6. Aortic valve stenosis, etiology of cardiac valve disease unspecified  spironolactone (ALDACTONE) 25 MG Tab    torsemide (DEMADEX) 100 MG Tab   7. Pulmonary  hypertension (HCC)  spironolactone (ALDACTONE) 25 MG Tab    torsemide (DEMADEX) 100 MG Tab    Basic Metabolic Panel   8. Chronic atrial fibrillation (HCC)  spironolactone (ALDACTONE) 25 MG Tab    torsemide (DEMADEX) 100 MG Tab    apixaban (ELIQUIS) 2.5mg Tab    Basic Metabolic Panel   9. Coronary artery disease involving native coronary artery of native heart without angina pectoris  spironolactone (ALDACTONE) 25 MG Tab    torsemide (DEMADEX) 100 MG Tab    atorvastatin (LIPITOR) 80 MG tablet    Basic Metabolic Panel   10. HTN (hypertension), malignant  spironolactone (ALDACTONE) 25 MG Tab    torsemide (DEMADEX) 100 MG Tab    Basic Metabolic Panel       Medical Decision Making:  Today's Assessment / Status / Plan:   1. HFrEF, Stage C, Class 2-3, LVEF 30% was at 40%: Patient continues to be volume overloaded  -Heart failure likely due to aortic stenosis  -Pt also has elevated pulmonary pressures  -Stop furosemide  -Start torsemide 100 mg daily, patient instructed to call our office by Friday if she is not losing weight, will consider adding afternoon dose  -Will attempt to diurese her before sending her for further imaging studies (dobutamine stress test)  -Continue lisinopril 20 mg daily  -Continue carvedilol 6.25 mg twice a day  -Continue spironolactone 25 mg daily (recent labs stable and reviewed with patient)  -BMP in 1 week (will follow kidney function carefully)  -If EF does not improve within the next 3 months or so, to consider ICD for primary prevention if patient is a candidate  -Reinforced s/sx of worsening heart failure with patient and weight monitoring. Pt verbalizes understanding. Pt to call office or RTC if present.   -If weights or blood pressure do not improve, patient to contact our office    2.  Hypertension: Blood pressure better today, patient has not taken any of her medications yet  -Continue recommendations per above  -Discussed low-sodium diet, recommend she can continue to eat the noodles  from the [employer], but no seasoning packet  -Monitor and log Blood pressures at home.  Take blood pressures 2 hours after medications.  Call office or RTC if BP increasing or >180/100 or if symptoms of elevated blood pressure present. Reviewed s/sx of stroke and heart attack.  Patient does have a questionable history of a TIA.  Patient to go to ER or call 911 if present.     3.  Aortic stenosis: Moderate per recent echo  -Will plan for dobutamine stress test in the future  -Will attempt to diurese patient more  -May need a referral over to the valve clinic    4.  Pulmonary HTN, WHO group 2-3:   -recommendations per above     5. Atrial fibrillation:  -Continue Eliquis 2.5 mg twice a day    6.  CAD, history of CABG in '04:  -Last LDL 49 on 6/11/2019  -Continue atorvastatin 80 mg daily    FU in clinic in 2 weeks with labs in 1 week. Sooner if needed.    Patient verbalizes understanding and agrees with the plan of care.     PLEASE NOTE: This Note was created using voice recognition Software. I have made every reasonable attempt to correct obvious errors, but I expect that there are errors of grammar and possibly content that I did not discover before finalizing the note

## 2021-01-11 ENCOUNTER — HOSPITAL ENCOUNTER (OUTPATIENT)
Facility: MEDICAL CENTER | Age: 86
End: 2021-01-11
Attending: NURSE PRACTITIONER
Payer: MEDICARE

## 2021-01-11 DIAGNOSIS — I27.20 PULMONARY HYPERTENSION (HCC): ICD-10-CM

## 2021-01-11 DIAGNOSIS — I50.20 ACC/AHA STAGE C SYSTOLIC HEART FAILURE (HCC): ICD-10-CM

## 2021-01-11 DIAGNOSIS — Z23 NEED FOR VACCINATION: ICD-10-CM

## 2021-01-11 DIAGNOSIS — I25.10 CORONARY ARTERY DISEASE INVOLVING NATIVE CORONARY ARTERY OF NATIVE HEART WITHOUT ANGINA PECTORIS: ICD-10-CM

## 2021-01-11 DIAGNOSIS — Z79.899 HIGH RISK MEDICATION USE: ICD-10-CM

## 2021-01-11 DIAGNOSIS — I10 HTN (HYPERTENSION), MALIGNANT: ICD-10-CM

## 2021-01-11 DIAGNOSIS — I48.20 CHRONIC ATRIAL FIBRILLATION (HCC): ICD-10-CM

## 2021-01-11 LAB
ANION GAP SERPL CALC-SCNC: 8 MMOL/L (ref 7–16)
BUN SERPL-MCNC: 32 MG/DL (ref 8–22)
CALCIUM SERPL-MCNC: 9.3 MG/DL (ref 8.5–10.5)
CHLORIDE SERPL-SCNC: 92 MMOL/L (ref 96–112)
CO2 SERPL-SCNC: 32 MMOL/L (ref 20–33)
CREAT SERPL-MCNC: 1.28 MG/DL (ref 0.5–1.4)
GLUCOSE SERPL-MCNC: 217 MG/DL (ref 65–99)
POTASSIUM SERPL-SCNC: 5.5 MMOL/L (ref 3.6–5.5)
SODIUM SERPL-SCNC: 132 MMOL/L (ref 135–145)

## 2021-01-11 PROCEDURE — 80048 BASIC METABOLIC PNL TOTAL CA: CPT

## 2021-01-20 ENCOUNTER — OFFICE VISIT (OUTPATIENT)
Dept: CARDIOLOGY | Facility: MEDICAL CENTER | Age: 86
End: 2021-01-20
Payer: MEDICARE

## 2021-01-20 VITALS
HEART RATE: 73 BPM | RESPIRATION RATE: 15 BRPM | OXYGEN SATURATION: 90 % | WEIGHT: 104 LBS | SYSTOLIC BLOOD PRESSURE: 100 MMHG | BODY MASS INDEX: 20.96 KG/M2 | DIASTOLIC BLOOD PRESSURE: 58 MMHG | HEIGHT: 59 IN

## 2021-01-20 DIAGNOSIS — I10 HTN (HYPERTENSION), MALIGNANT: ICD-10-CM

## 2021-01-20 DIAGNOSIS — I50.21 ACUTE SYSTOLIC CONGESTIVE HEART FAILURE, NYHA CLASS 2 (HCC): ICD-10-CM

## 2021-01-20 DIAGNOSIS — I48.20 CHRONIC ATRIAL FIBRILLATION (HCC): ICD-10-CM

## 2021-01-20 DIAGNOSIS — I27.20 PULMONARY HYPERTENSION (HCC): ICD-10-CM

## 2021-01-20 DIAGNOSIS — Z79.899 HIGH RISK MEDICATION USE: ICD-10-CM

## 2021-01-20 DIAGNOSIS — I50.20 ACC/AHA STAGE C SYSTOLIC HEART FAILURE (HCC): ICD-10-CM

## 2021-01-20 DIAGNOSIS — I42.9 FAMILIAL CARDIOMYOPATHY (HCC): ICD-10-CM

## 2021-01-20 DIAGNOSIS — I35.0 AORTIC VALVE STENOSIS, ETIOLOGY OF CARDIAC VALVE DISEASE UNSPECIFIED: ICD-10-CM

## 2021-01-20 DIAGNOSIS — I25.10 CORONARY ARTERY DISEASE INVOLVING NATIVE CORONARY ARTERY OF NATIVE HEART WITHOUT ANGINA PECTORIS: ICD-10-CM

## 2021-01-20 PROCEDURE — 99214 OFFICE O/P EST MOD 30 MIN: CPT | Performed by: NURSE PRACTITIONER

## 2021-01-20 ASSESSMENT — ENCOUNTER SYMPTOMS
COUGH: 0
FEVER: 0
PALPITATIONS: 0
SHORTNESS OF BREATH: 1
DIZZINESS: 0
ABDOMINAL PAIN: 0
MYALGIAS: 0
CLAUDICATION: 0
PND: 0
ORTHOPNEA: 0

## 2021-01-20 ASSESSMENT — FIBROSIS 4 INDEX: FIB4 SCORE: 0.91

## 2021-01-20 NOTE — TELEPHONE ENCOUNTER
1. Caller Name: Ana Torres                                           Call Back Number: [phone] (home)         Patient approves a detailed voicemail message: yes    Would like a prescription for a new diabetic testing for nena told her id talk with you    Patient is informed and verbalizes understanding.

## 2021-01-20 NOTE — PATIENT INSTRUCTIONS
Try decreasing torsemide to 50 mg daily if needed increase to 100 mg as needed for increased weight gain, SOB.   Schedule Dobutamine stress test   Follow up in 6 weeks

## 2021-01-20 NOTE — PROGRESS NOTES
"Chief Complaint   Patient presents with   • Congestive Heart Failure       Subjective:   Robyn Torres is a 85 y.o. female who presents today for follow-up on her valvular heart disease, pulmonary hypertension and edema with her son, Dank.    Patient of Dr. Ruiz.  She was last seen in clinic on 1/6/2021.  During that visit, furosemide was discontinued and she was started on torsemide 100 mg daily.  She reports no problems with the new medication.    Patient reports she has been losing water weight with the change in medications, her home weights are down to 108 pounds.    Patient's son reports her shortness of breath with exertion has improved along with her endurance.  Patient does continue to have some fatigue and some lower extremity edema.    She otherwise denies chest pain, palpitation, orthopnea, PND or dizziness/lightheadedness.    Patient has been working on her diet with her son and reducing her sodium intake.    Patient reports she is drinking about 2 cups of coffee and a large hot cocoa, but does not drink much water.    Additonally, patient has the following medical problems:    -Hypertension    -Valvular heart disease, aortic stenosis    -Coronary artery disease, CABG in 2004    -Atrial fibrillation: Taking Eliquis    -Pulmonary hypertension    -questionable TIA in the past.    Past Medical History:   Diagnosis Date   • CAD (coronary artery disease)    • CATARACT    • CHF (congestive heart failure) ([facility])    • Cystocele    • Diabetes     insulin   • DJD (degenerative joint disease)    • HTN (hypertension), benign    • Hyperlipidemia    • Hypertension    • Medical home    • Myocardial infarct ([facility]) 2000   • PVD (peripheral vascular disease) ([facility])    • Renal disorder     \"watching my kidneys\"     • Unspecified urinary incontinence    • Urinary bladder disorder      Past Surgical History:   Procedure Laterality Date   • CATARACT PHACO WITH IOL  3/12/2014    Performed by Murtaza Casey M.D. at " SURGERY SAME DAY [facility] ORS   • MULTIPLE CORONARY ARTERY BYPASS  2004   • ABDOMINAL HYSTERECTOMY TOTAL       Family History   Problem Relation Age of Onset   • Hypertension Mother    • Heart Disease Mother    • No Known Problems Maternal Grandmother    • No Known Problems Maternal Grandfather    • Diabetes Daughter    • Seizures Son    • No Known Problems Son    • Heart Disease Son      Social History     Socioeconomic History   • Marital status:      Spouse name: Not on file   • Number of children: Not on file   • Years of education: Not on file   • Highest education level: Not on file   Occupational History   • Not on file   Social Needs   • Financial resource strain: Not hard at all   • Food insecurity     Worry: Never true     Inability: Never true   • Transportation needs     Medical: No     Non-medical: No   Tobacco Use   • Smoking status: Never Smoker   • Smokeless tobacco: Never Used   Substance and Sexual Activity   • Alcohol use: No   • Drug use: No   • Sexual activity: Never     Partners: Male   Lifestyle   • Physical activity     Days per week: Not on file     Minutes per session: Not on file   • Stress: Not on file   Relationships   • Social connections     Talks on phone: Not on file     Gets together: Not on file     Attends Confucianist service: Not on file     Active member of club or organization: Not on file     Attends meetings of clubs or organizations: Not on file     Relationship status: Not on file   • Intimate partner violence     Fear of current or ex partner: Not on file     Emotionally abused: Not on file     Physically abused: Not on file     Forced sexual activity: Not on file   Other Topics Concern   • Not on file   Social History Narrative   • Not on file     Allergies   Allergen Reactions   • Tetracaine Hcl [Tetcaine]    • Vancomycin    • Vigamox [Moxifloxacin]      Outpatient Encounter Medications as of 1/20/2021   Medication Sig Dispense Refill   • spironolactone (ALDACTONE)  "25 MG Tab Take 1 Tab by mouth every day. 30 Tab 11   • torsemide (DEMADEX) 100 MG Tab Take 1 Tab by mouth every day. 30 Tab 3   • atorvastatin (LIPITOR) 80 MG tablet Take 1 Tab by mouth every evening. 100 Tab 3   • apixaban (ELIQUIS) 2.5mg Tab Take 1 Tab by mouth 2 Times a Day. 200 Tab 3   • lisinopril (PRINIVIL) 20 MG Tab Take 1 Tab by mouth every day. 90 Tab 3   • carvedilol (COREG) 6.25 MG Tab TAKE ONE TABLET BY MOUTH TWICE A DAY WITH MEALS 200 Tab 3   • Multiple Vitamins-Minerals (MULTI COMPLETE PO) Take  by mouth.       No facility-administered encounter medications on file as of 1/20/2021.      Review of Systems   Constitutional: Positive for malaise/fatigue. Negative for fever.   Respiratory: Positive for shortness of breath. Negative for cough.    Cardiovascular: Positive for leg swelling. Negative for chest pain, palpitations, orthopnea, claudication and PND.   Gastrointestinal: Negative for abdominal pain.   Musculoskeletal: Negative for myalgias.   Neurological: Negative for dizziness.   All other systems reviewed and are negative.       Objective:   /58 (BP Location: Left arm, Patient Position: Sitting, BP Cuff Size: Adult)   Pulse 73   Resp 15   Ht 1.499 m (4' 11\")   Wt 47.2 kg (104 lb)   SpO2 90%   BMI 21.01 kg/m²     Physical Exam   Constitutional: She is oriented to person, place, and time. She appears well-developed and well-nourished.   Using a walker for ambulation   HENT:   Head: Normocephalic and atraumatic.   Eyes: Pupils are equal, round, and reactive to light. EOM are normal.   Neck: Normal range of motion. Neck supple. No JVD present.   Cardiovascular: Normal rate, regular rhythm and normal heart sounds.   Pulmonary/Chest: Effort normal and breath sounds normal. No respiratory distress. She has no wheezes. She has no rales.   Abdominal: Soft. Bowel sounds are normal.   Musculoskeletal:         General: Edema (R>L 1+ LE edema) present.   Neurological: She is alert and oriented to " person, place, and time.   Skin: Skin is warm and dry.   Psychiatric: She has a normal mood and affect. Her behavior is normal.   Vitals reviewed.    Lab Results   Component Value Date/Time    CHOLSTRLTOT 101 06/11/2019 01:28 PM    LDL 49 06/11/2019 01:28 PM    HDL 39 (A) 06/11/2019 01:28 PM    TRIGLYCERIDE 66 06/11/2019 01:28 PM       Lab Results   Component Value Date/Time    SODIUM 132 (L) 01/11/2021 01:10 PM    POTASSIUM 5.5 01/11/2021 01:10 PM    CHLORIDE 92 (L) 01/11/2021 01:10 PM    CO2 32 01/11/2021 01:10 PM    GLUCOSE 217 (H) 01/11/2021 01:10 PM    BUN 32 (H) 01/11/2021 01:10 PM    CREATININE 1.28 01/11/2021 01:10 PM     Lab Results   Component Value Date/Time    ALKPHOSPHAT 106 (H) 10/02/2020 12:45 PM    ALKPHOSPHAT 107 (H) 10/02/2020 12:45 PM    ASTSGOT 18 10/02/2020 12:45 PM    ASTSGOT 14 10/02/2020 12:45 PM    ALTSGPT 12 10/02/2020 12:45 PM    ALTSGPT 12 10/02/2020 12:45 PM    TBILIRUBIN 0.6 10/02/2020 12:45 PM    TBILIRUBIN 0.6 10/02/2020 12:45 PM      Echocardiogram 4/3/2014  CONCLUSIONS   Akinesis of the mid to distal inferior wall/inferoseptum, apex.   Moderately dilated left atrium. Estrellita 39 ml   Left ventricular ejection fraction is 50%  .Normal left ventricular   size.   Mild to moderate mitral regurgitation.   Right ventricular systolic pressure is estimated to be 40-45 mmHg.   No prior to compare     Transthoracic Echo Report 10/6/2014  Technically difficult study, Mild global hypokinesis.   Left ventricular ejection fraction is 45%.   Grade II diastolic dysfunction.   Moderately increased LA area.   Moderate mitral regurgitation.   Mitral annular calcification.   The aortic valve is not well visualized.   Aortic sclerosis without stenosis.   Trace aortic insufficiency.   Right ventricular systolic pressure is estimated to be 54-59 mmHg.   Consistent with moderate pulmonary hypertension.     Transthoracic Echo Report 3/30/2016  Left ventricle is mildly dilated. Normal left ventricular wall    thickness. Reduced left ventricular systolic function. Left ventricular ejection fraction is visually estimated to be 35%. Global hypokinesis   with regional variation. Unable to assess for diastolic dysfunction.  Estimated right ventricular systolic pressure  is 55 mmHg.  No significant valve abnormalities.   Compared to the images of the prior study done -  there has been no significant change.      Transthoracic Echo Report 2/8/2017  Prior echo - 03/30/16Normal left ventricular chamber size.  Mild concentric left ventricular hypertrophy.  Reduced left ventricular systolic function.  Left ventricular ejection fraction is visually estimated to be 45%.  Hypokinetic apex.  Grade I diastolic dysfunction.  Mitral annular calcification.  Calcification of the mitral valve leaflets.  No mitral stenosis.  Trace mitral regurgitation.  Structurally normal aortic valve.  Aortic sclerosis without stenosis.  Structurally normal tricuspid valve without significant stenosis.  Mild tricuspid regurgitation.  Estimated right ventricular systolic pressure  is 35 mmHg.  Normal pericardium without effusion.     Transthoracic Echo Report 6/11/2019  Mildly reduced left ventricular systolic function.  Left ventricular ejection fraction is visually estimated to be 45%.  Hypokinesis/akinesis of the left ventricular apex and the mid to distal septal wall segments.  Thrombus is not observed in the left ventricular apex.  Aortic sclerosis without stenosis.  Mild mitral regurgitation.  Right heart pressures are consistent with severe pulmonary   hypertension.     Transthoracic Echo Report 9/11/2020  Prior echocardiogram 6/11/2019.  Left ventricular ejection fraction is visually estimated to be 40%.  There is basal hyperkinesis with apical hypokinesis.  By the continuity equation, the MEGA is 0.8 cm2.  Consider low flow, low gradient severe aortic stenosis.  Estimated right ventricular systolic pressure  is 100 mmHg.  Right heart pressures are  consistent with severe pulmonary   hypertension.     Transthoracic Echo Report 11/10/2020  Prior Echo 9/11/2020  Moderate aortic stenosis.  No aortic insufficiency.  Left ventricular ejection fraction is visually estimated to be 30%.  Moderate tricuspid regurgitation.  Estimated right ventricular systolic pressure  is 85 mmHg.      Assessment:     1. Aortic valve stenosis, etiology of cardiac valve disease unspecified  EC-ECHOCARDIOGRAM DOBUTAMINE REST/STRESS W/O CONT    Basic Metabolic Panel   2. ACC/AHA stage C systolic heart failure (HCC)  EC-ECHOCARDIOGRAM DOBUTAMINE REST/STRESS W/O CONT    Basic Metabolic Panel   3. Acute systolic congestive heart failure, NYHA class 2 (HCC)     4. High risk medication use     5. HTN (hypertension), malignant     6. Pulmonary hypertension (HCC)     7. Chronic atrial fibrillation (HCC)     8. Coronary artery disease involving native coronary artery of native heart without angina pectoris     9. Familial cardiomyopathy (HCC)         Medical Decision Making:  Today's Assessment / Status / Plan:   1. HFrEF, Stage C, Class 2-3, LVEF 30% was at 40%: Patient's volume status has improved, but does continue to have some mild lower extremity edema.  -Heart failure likely due to aortic stenosis  -Pt also has elevated pulmonary pressures  -Patient to try to reduce torsemide to 50 mg daily, but can increase back up to 200 mg if her weights are increasing, has increased shortness of breath or swelling.  -Continue lisinopril 20 mg daily  -Continue carvedilol 6.25 mg twice a day  -Continue spironolactone 25 mg daily (recent labs stable and reviewed with patient)  -BMP in 2 weeks (will follow kidney function carefully)  -If EF does not improve within the next 3 months or so, to consider ICD for primary prevention if patient is a candidate  -Reinforced s/sx of worsening heart failure with patient and weight monitoring. Pt verbalizes understanding. Pt to call office or RTC if present.   -If  weights or blood pressure do not improve, patient to contact our office  -Continue to 1000 mg sodium diet per day and try to increase her to hydration a little bit.    2.  Hypertension: Blood pressure stable  -Continue recommendations per above  -Monitor and log Blood pressures at home.  Take blood pressures 2 hours after medications.  Call office or RTC if BP increasing or >180/100 or if symptoms of elevated blood pressure present. Reviewed s/sx of stroke and heart attack.  Patient does have a questionable history of a TIA.  Patient to go to ER or call 911 if present.     3.  Aortic stenosis: Moderate per recent echo  -Encourage patient to get a dobutamine stress test now that she is more euvolemic to evaluate her aortic stenosis  -May need a referral over to the valve clinic    4.  Pulmonary HTN, WHO group 2-3:   -recommendations per above     5. Atrial fibrillation:  -Continue Eliquis 2.5 mg twice a day    6.  CAD, history of CABG in '04:  -Last LDL 49 on 6/11/2019  -Continue atorvastatin 80 mg daily    FU in clinic in 6 weeks after dobutamine stress test and labs in 2 weeks. Sooner if needed.    Patient verbalizes understanding and agrees with the plan of care.     PLEASE NOTE: This Note was created using voice recognition Software. I have made every reasonable attempt to correct obvious errors, but I expect that there are errors of grammar and possibly content that I did not discover before finalizing the note

## 2021-02-09 ENCOUNTER — APPOINTMENT (OUTPATIENT)
Dept: RADIOLOGY | Facility: MEDICAL CENTER | Age: 86
DRG: 853 | End: 2021-02-09
Attending: INTERNAL MEDICINE
Payer: MEDICARE

## 2021-02-09 ENCOUNTER — TELEPHONE (OUTPATIENT)
Dept: MEDICAL GROUP | Facility: MEDICAL CENTER | Age: 86
End: 2021-02-09

## 2021-02-09 ENCOUNTER — HOSPITAL ENCOUNTER (INPATIENT)
Facility: MEDICAL CENTER | Age: 86
LOS: 9 days | DRG: 853 | End: 2021-02-18
Attending: EMERGENCY MEDICINE | Admitting: HOSPITALIST
Payer: MEDICARE

## 2021-02-09 ENCOUNTER — APPOINTMENT (OUTPATIENT)
Dept: RADIOLOGY | Facility: MEDICAL CENTER | Age: 86
DRG: 853 | End: 2021-02-09
Attending: EMERGENCY MEDICINE
Payer: MEDICARE

## 2021-02-09 DIAGNOSIS — A41.9 SEPSIS ASSOCIATED HYPOTENSION (HCC): ICD-10-CM

## 2021-02-09 DIAGNOSIS — N17.9 AKI (ACUTE KIDNEY INJURY) (HCC): ICD-10-CM

## 2021-02-09 DIAGNOSIS — E87.5 HYPERKALEMIA: ICD-10-CM

## 2021-02-09 DIAGNOSIS — R53.1 WEAKNESS: ICD-10-CM

## 2021-02-09 DIAGNOSIS — I95.9 SEPSIS ASSOCIATED HYPOTENSION (HCC): ICD-10-CM

## 2021-02-09 DIAGNOSIS — Z95.0 PACEMAKER: ICD-10-CM

## 2021-02-09 PROBLEM — E83.52 HYPERCALCEMIA: Status: ACTIVE | Noted: 2021-02-09

## 2021-02-09 LAB
ALBUMIN SERPL BCP-MCNC: 4.1 G/DL (ref 3.2–4.9)
ALBUMIN/GLOB SERPL: 1 G/DL
ALP SERPL-CCNC: 117 U/L (ref 30–99)
ALT SERPL-CCNC: 26 U/L (ref 2–50)
ANION GAP SERPL CALC-SCNC: 14 MMOL/L (ref 7–16)
ANION GAP SERPL CALC-SCNC: 16 MMOL/L (ref 7–16)
APPEARANCE UR: ABNORMAL
AST SERPL-CCNC: 22 U/L (ref 12–45)
BACTERIA #/AREA URNS HPF: ABNORMAL /HPF
BASOPHILS # BLD AUTO: 0.3 % (ref 0–1.8)
BASOPHILS # BLD: 0.03 K/UL (ref 0–0.12)
BILIRUB SERPL-MCNC: 0.6 MG/DL (ref 0.1–1.5)
BILIRUB UR QL STRIP.AUTO: NEGATIVE
BUN SERPL-MCNC: 136 MG/DL (ref 8–22)
BUN SERPL-MCNC: 147 MG/DL (ref 8–22)
CALCIUM SERPL-MCNC: 11 MG/DL (ref 8.5–10.5)
CALCIUM SERPL-MCNC: 9.2 MG/DL (ref 8.5–10.5)
CHLORIDE SERPL-SCNC: 88 MMOL/L (ref 96–112)
CHLORIDE SERPL-SCNC: 99 MMOL/L (ref 96–112)
CO2 SERPL-SCNC: 18 MMOL/L (ref 20–33)
CO2 SERPL-SCNC: 22 MMOL/L (ref 20–33)
COLOR UR: YELLOW
CREAT SERPL-MCNC: 2.84 MG/DL (ref 0.5–1.4)
CREAT SERPL-MCNC: 3.67 MG/DL (ref 0.5–1.4)
EKG IMPRESSION: NORMAL
EOSINOPHIL # BLD AUTO: 0.06 K/UL (ref 0–0.51)
EOSINOPHIL NFR BLD: 0.6 % (ref 0–6.9)
EPI CELLS #/AREA URNS HPF: NEGATIVE /HPF
ERYTHROCYTE [DISTWIDTH] IN BLOOD BY AUTOMATED COUNT: 46.8 FL (ref 35.9–50)
GLOBULIN SER CALC-MCNC: 4 G/DL (ref 1.9–3.5)
GLUCOSE BLD-MCNC: 262 MG/DL (ref 65–99)
GLUCOSE SERPL-MCNC: 119 MG/DL (ref 65–99)
GLUCOSE SERPL-MCNC: 222 MG/DL (ref 65–99)
GLUCOSE UR STRIP.AUTO-MCNC: NEGATIVE MG/DL
HCT VFR BLD AUTO: 44.9 % (ref 37–47)
HGB BLD-MCNC: 14.9 G/DL (ref 12–16)
HYALINE CASTS #/AREA URNS LPF: ABNORMAL /LPF
IMM GRANULOCYTES # BLD AUTO: 0.05 K/UL (ref 0–0.11)
IMM GRANULOCYTES NFR BLD AUTO: 0.5 % (ref 0–0.9)
INR PPP: 1.28 (ref 0.87–1.13)
KETONES UR STRIP.AUTO-MCNC: NEGATIVE MG/DL
LACTATE BLD-SCNC: 1.6 MMOL/L (ref 0.5–2)
LACTATE BLD-SCNC: 2 MMOL/L (ref 0.5–2)
LEUKOCYTE ESTERASE UR QL STRIP.AUTO: ABNORMAL
LYMPHOCYTES # BLD AUTO: 1.18 K/UL (ref 1–4.8)
LYMPHOCYTES NFR BLD: 11.6 % (ref 22–41)
MAGNESIUM SERPL-MCNC: 1.7 MG/DL (ref 1.5–2.5)
MCH RBC QN AUTO: 31 PG (ref 27–33)
MCHC RBC AUTO-ENTMCNC: 33.2 G/DL (ref 33.6–35)
MCV RBC AUTO: 93.5 FL (ref 81.4–97.8)
MICRO URNS: ABNORMAL
MONOCYTES # BLD AUTO: 0.68 K/UL (ref 0–0.85)
MONOCYTES NFR BLD AUTO: 6.7 % (ref 0–13.4)
NEUTROPHILS # BLD AUTO: 8.15 K/UL (ref 2–7.15)
NEUTROPHILS NFR BLD: 80.3 % (ref 44–72)
NITRITE UR QL STRIP.AUTO: NEGATIVE
NRBC # BLD AUTO: 0 K/UL
NRBC BLD-RTO: 0 /100 WBC
PH UR STRIP.AUTO: 5.5 [PH] (ref 5–8)
PLATELET # BLD AUTO: 361 K/UL (ref 164–446)
PMV BLD AUTO: 10.4 FL (ref 9–12.9)
POTASSIUM SERPL-SCNC: 5.4 MMOL/L (ref 3.6–5.5)
POTASSIUM SERPL-SCNC: 6.6 MMOL/L (ref 3.6–5.5)
PROT SERPL-MCNC: 8.1 G/DL (ref 6–8.2)
PROT UR QL STRIP: 100 MG/DL
PROTHROMBIN TIME: 16.4 SEC (ref 12–14.6)
RBC # BLD AUTO: 4.8 M/UL (ref 4.2–5.4)
RBC # URNS HPF: ABNORMAL /HPF
RBC UR QL AUTO: NEGATIVE
SODIUM SERPL-SCNC: 126 MMOL/L (ref 135–145)
SODIUM SERPL-SCNC: 131 MMOL/L (ref 135–145)
SP GR UR STRIP.AUTO: 1.01
TROPONIN T SERPL-MCNC: 81 NG/L (ref 6–19)
UROBILINOGEN UR STRIP.AUTO-MCNC: 0.2 MG/DL
WBC # BLD AUTO: 10.2 K/UL (ref 4.8–10.8)
WBC #/AREA URNS HPF: ABNORMAL /HPF

## 2021-02-09 PROCEDURE — U0003 INFECTIOUS AGENT DETECTION BY NUCLEIC ACID (DNA OR RNA); SEVERE ACUTE RESPIRATORY SYNDROME CORONAVIRUS 2 (SARS-COV-2) (CORONAVIRUS DISEASE [COVID-19]), AMPLIFIED PROBE TECHNIQUE, MAKING USE OF HIGH THROUGHPUT TECHNOLOGIES AS DESCRIBED BY CMS-2020-01-R: HCPCS

## 2021-02-09 PROCEDURE — 80053 COMPREHEN METABOLIC PANEL: CPT

## 2021-02-09 PROCEDURE — 36415 COLL VENOUS BLD VENIPUNCTURE: CPT

## 2021-02-09 PROCEDURE — 87086 URINE CULTURE/COLONY COUNT: CPT

## 2021-02-09 PROCEDURE — 84484 ASSAY OF TROPONIN QUANT: CPT

## 2021-02-09 PROCEDURE — 84145 PROCALCITONIN (PCT): CPT

## 2021-02-09 PROCEDURE — 87040 BLOOD CULTURE FOR BACTERIA: CPT

## 2021-02-09 PROCEDURE — 82962 GLUCOSE BLOOD TEST: CPT

## 2021-02-09 PROCEDURE — 700105 HCHG RX REV CODE 258: Performed by: HOSPITALIST

## 2021-02-09 PROCEDURE — 99223 1ST HOSP IP/OBS HIGH 75: CPT | Mod: AI | Performed by: HOSPITALIST

## 2021-02-09 PROCEDURE — 770022 HCHG ROOM/CARE - ICU (200)

## 2021-02-09 PROCEDURE — 700111 HCHG RX REV CODE 636 W/ 250 OVERRIDE (IP): Performed by: HOSPITALIST

## 2021-02-09 PROCEDURE — 83605 ASSAY OF LACTIC ACID: CPT

## 2021-02-09 PROCEDURE — 71045 X-RAY EXAM CHEST 1 VIEW: CPT

## 2021-02-09 PROCEDURE — 81001 URINALYSIS AUTO W/SCOPE: CPT

## 2021-02-09 PROCEDURE — 700102 HCHG RX REV CODE 250 W/ 637 OVERRIDE(OP): Performed by: EMERGENCY MEDICINE

## 2021-02-09 PROCEDURE — 85610 PROTHROMBIN TIME: CPT

## 2021-02-09 PROCEDURE — U0005 INFEC AGEN DETEC AMPLI PROBE: HCPCS

## 2021-02-09 PROCEDURE — 87077 CULTURE AEROBIC IDENTIFY: CPT

## 2021-02-09 PROCEDURE — 99291 CRITICAL CARE FIRST HOUR: CPT

## 2021-02-09 PROCEDURE — 96375 TX/PRO/DX INJ NEW DRUG ADDON: CPT

## 2021-02-09 PROCEDURE — 87186 SC STD MICRODIL/AGAR DIL: CPT

## 2021-02-09 PROCEDURE — 700111 HCHG RX REV CODE 636 W/ 250 OVERRIDE (IP): Performed by: EMERGENCY MEDICINE

## 2021-02-09 PROCEDURE — 96365 THER/PROPH/DIAG IV INF INIT: CPT

## 2021-02-09 PROCEDURE — 700105 HCHG RX REV CODE 258: Performed by: EMERGENCY MEDICINE

## 2021-02-09 PROCEDURE — 80048 BASIC METABOLIC PNL TOTAL CA: CPT

## 2021-02-09 PROCEDURE — 93005 ELECTROCARDIOGRAM TRACING: CPT | Performed by: EMERGENCY MEDICINE

## 2021-02-09 PROCEDURE — 85025 COMPLETE CBC W/AUTO DIFF WBC: CPT

## 2021-02-09 PROCEDURE — 83735 ASSAY OF MAGNESIUM: CPT

## 2021-02-09 PROCEDURE — 700101 HCHG RX REV CODE 250: Performed by: EMERGENCY MEDICINE

## 2021-02-09 RX ORDER — SODIUM CHLORIDE 9 MG/ML
2000 INJECTION, SOLUTION INTRAVENOUS ONCE
Status: COMPLETED | OUTPATIENT
Start: 2021-02-09 | End: 2021-02-10

## 2021-02-09 RX ORDER — ACETAMINOPHEN 325 MG/1
650 TABLET ORAL EVERY 6 HOURS PRN
Status: DISCONTINUED | OUTPATIENT
Start: 2021-02-09 | End: 2021-02-18 | Stop reason: HOSPADM

## 2021-02-09 RX ORDER — BISACODYL 10 MG
10 SUPPOSITORY, RECTAL RECTAL
Status: DISCONTINUED | OUTPATIENT
Start: 2021-02-09 | End: 2021-02-18 | Stop reason: HOSPADM

## 2021-02-09 RX ORDER — AMOXICILLIN 250 MG
2 CAPSULE ORAL 2 TIMES DAILY
Status: DISCONTINUED | OUTPATIENT
Start: 2021-02-09 | End: 2021-02-18 | Stop reason: HOSPADM

## 2021-02-09 RX ORDER — DEXTROSE MONOHYDRATE 25 G/50ML
50 INJECTION, SOLUTION INTRAVENOUS ONCE
Status: COMPLETED | OUTPATIENT
Start: 2021-02-09 | End: 2021-02-09

## 2021-02-09 RX ORDER — SODIUM CHLORIDE, SODIUM LACTATE, POTASSIUM CHLORIDE, CALCIUM CHLORIDE 600; 310; 30; 20 MG/100ML; MG/100ML; MG/100ML; MG/100ML
INJECTION, SOLUTION INTRAVENOUS CONTINUOUS
Status: DISCONTINUED | OUTPATIENT
Start: 2021-02-09 | End: 2021-02-10

## 2021-02-09 RX ORDER — POLYETHYLENE GLYCOL 3350 17 G/17G
1 POWDER, FOR SOLUTION ORAL
Status: DISCONTINUED | OUTPATIENT
Start: 2021-02-09 | End: 2021-02-18 | Stop reason: HOSPADM

## 2021-02-09 RX ORDER — SODIUM CHLORIDE 9 MG/ML
500 INJECTION, SOLUTION INTRAVENOUS ONCE
Status: COMPLETED | OUTPATIENT
Start: 2021-02-09 | End: 2021-02-09

## 2021-02-09 RX ORDER — SODIUM CHLORIDE, SODIUM LACTATE, POTASSIUM CHLORIDE, AND CALCIUM CHLORIDE .6; .31; .03; .02 G/100ML; G/100ML; G/100ML; G/100ML
1000 INJECTION, SOLUTION INTRAVENOUS
Status: DISCONTINUED | OUTPATIENT
Start: 2021-02-09 | End: 2021-02-10

## 2021-02-09 RX ORDER — HEPARIN SODIUM 5000 [USP'U]/ML
5000 INJECTION, SOLUTION INTRAVENOUS; SUBCUTANEOUS EVERY 8 HOURS
Status: DISCONTINUED | OUTPATIENT
Start: 2021-02-09 | End: 2021-02-12

## 2021-02-09 RX ORDER — SODIUM CHLORIDE, SODIUM LACTATE, POTASSIUM CHLORIDE, AND CALCIUM CHLORIDE .6; .31; .03; .02 G/100ML; G/100ML; G/100ML; G/100ML
30 INJECTION, SOLUTION INTRAVENOUS
Status: DISCONTINUED | OUTPATIENT
Start: 2021-02-09 | End: 2021-02-10

## 2021-02-09 RX ADMIN — INSULIN HUMAN 10 UNITS: 100 INJECTION, SOLUTION PARENTERAL at 17:05

## 2021-02-09 RX ADMIN — DEXTROSE MONOHYDRATE 50 ML: 25 INJECTION, SOLUTION INTRAVENOUS at 17:05

## 2021-02-09 RX ADMIN — HEPARIN SODIUM 5000 UNITS: 5000 INJECTION, SOLUTION INTRAVENOUS; SUBCUTANEOUS at 23:09

## 2021-02-09 RX ADMIN — SODIUM CHLORIDE 500 ML: 9 INJECTION, SOLUTION INTRAVENOUS at 17:05

## 2021-02-09 RX ADMIN — CEFEPIME 2 G: 2 INJECTION, POWDER, FOR SOLUTION INTRAVENOUS at 21:31

## 2021-02-09 RX ADMIN — CEFTRIAXONE SODIUM 2 G: 2 INJECTION, POWDER, FOR SOLUTION INTRAMUSCULAR; INTRAVENOUS at 17:58

## 2021-02-09 RX ADMIN — SODIUM CHLORIDE 1000 ML: 9 INJECTION, SOLUTION INTRAVENOUS at 18:02

## 2021-02-09 RX ADMIN — SODIUM CHLORIDE, POTASSIUM CHLORIDE, SODIUM LACTATE AND CALCIUM CHLORIDE: 600; 310; 30; 20 INJECTION, SOLUTION INTRAVENOUS at 21:33

## 2021-02-09 ASSESSMENT — ENCOUNTER SYMPTOMS
MYALGIAS: 0
COUGH: 0
HEADACHES: 0
SORE THROAT: 0
SENSORY CHANGE: 0
WEAKNESS: 1
FEVER: 0
LOSS OF CONSCIOUSNESS: 0
FOCAL WEAKNESS: 0
SPEECH CHANGE: 0
VOMITING: 0
ABDOMINAL PAIN: 0
BRUISES/BLEEDS EASILY: 0
DIARRHEA: 0
BACK PAIN: 0
DIZZINESS: 0
SPUTUM PRODUCTION: 0
EYE DISCHARGE: 0
NECK PAIN: 0
CONSTIPATION: 0
EYE PAIN: 0
WHEEZING: 0
PALPITATIONS: 0
NAUSEA: 0
HEMOPTYSIS: 0
CHILLS: 0
DEPRESSION: 0
CLAUDICATION: 0
DIAPHORESIS: 0

## 2021-02-09 ASSESSMENT — COGNITIVE AND FUNCTIONAL STATUS - GENERAL
TURNING FROM BACK TO SIDE WHILE IN FLAT BAD: A LOT
PERSONAL GROOMING: A LITTLE
STANDING UP FROM CHAIR USING ARMS: A LOT
MOVING TO AND FROM BED TO CHAIR: A LOT
MOVING TO AND FROM BED TO CHAIR: A LOT
WALKING IN HOSPITAL ROOM: A LOT
EATING MEALS: A LITTLE
MOVING FROM LYING ON BACK TO SITTING ON SIDE OF FLAT BED: A LOT
SUGGESTED CMS G CODE MODIFIER MOBILITY: CL
DRESSING REGULAR UPPER BODY CLOTHING: A LITTLE
DRESSING REGULAR LOWER BODY CLOTHING: A LITTLE
WALKING IN HOSPITAL ROOM: A LOT
TURNING FROM BACK TO SIDE WHILE IN FLAT BAD: A LOT
SUGGESTED CMS G CODE MODIFIER MOBILITY: CL
SUGGESTED CMS G CODE MODIFIER DAILY ACTIVITY: CK
TURNING FROM BACK TO SIDE WHILE IN FLAT BAD: A LITTLE
WALKING IN HOSPITAL ROOM: A LOT
CLIMB 3 TO 5 STEPS WITH RAILING: A LOT
TOILETING: A LOT
HELP NEEDED FOR BATHING: A LITTLE
STANDING UP FROM CHAIR USING ARMS: A LOT
EATING MEALS: A LITTLE
DRESSING REGULAR LOWER BODY CLOTHING: A LITTLE
MOBILITY SCORE: 14
STANDING UP FROM CHAIR USING ARMS: A LOT
PERSONAL GROOMING: A LITTLE
MOBILITY SCORE: 12
MOBILITY SCORE: 14
DAILY ACTIVITIY SCORE: 17
DRESSING REGULAR UPPER BODY CLOTHING: A LITTLE
MOVING TO AND FROM BED TO CHAIR: A LITTLE
MOVING FROM LYING ON BACK TO SITTING ON SIDE OF FLAT BED: A LOT
DAILY ACTIVITIY SCORE: 17
HELP NEEDED FOR BATHING: A LOT
SUGGESTED CMS G CODE MODIFIER MOBILITY: CL
MOVING FROM LYING ON BACK TO SITTING ON SIDE OF FLAT BED: A LOT
TOILETING: A LITTLE
CLIMB 3 TO 5 STEPS WITH RAILING: A LOT
SUGGESTED CMS G CODE MODIFIER DAILY ACTIVITY: CK

## 2021-02-09 ASSESSMENT — PATIENT HEALTH QUESTIONNAIRE - PHQ9
1. LITTLE INTEREST OR PLEASURE IN DOING THINGS: NOT AT ALL
1. LITTLE INTEREST OR PLEASURE IN DOING THINGS: NOT AT ALL
SUM OF ALL RESPONSES TO PHQ9 QUESTIONS 1 AND 2: 0
SUM OF ALL RESPONSES TO PHQ9 QUESTIONS 1 AND 2: 0
2. FEELING DOWN, DEPRESSED, IRRITABLE, OR HOPELESS: NOT AT ALL
2. FEELING DOWN, DEPRESSED, IRRITABLE, OR HOPELESS: NOT AT ALL

## 2021-02-09 ASSESSMENT — FIBROSIS 4 INDEX
FIB4 SCORE: 0.91
FIB4 SCORE: 0.99

## 2021-02-09 ASSESSMENT — LIFESTYLE VARIABLES
ALCOHOL_USE: NO
SUBSTANCE_ABUSE: 0

## 2021-02-09 NOTE — ED PROVIDER NOTES
ED Provider Note    Scribed for Kaleb Sorensen M.D. by Elda Avilez. 2/9/2021  2:24 PM    Primary care provider: Rich Coffey M.D.  Means of arrival: Ambulance  History obtained from: Patient  History limited by: None    CHIEF COMPLAINT  Chief Complaint   Patient presents with   • Weakness     Pt is complaining of increased weakness over the last three days. Pt states she has lost 10lbs over the last week. Pt usually ambulates on her own with a steady gait. Pt lives by herself and since becoming weak, patient has been having episodes of incontinence.    • GLF     Pt fell onto her knees today and used her med alert button. EMS found pt on her knees. Pt denies hitting her head. Pt taking eliquis.      HPI  Ana Torres is a 85 y.o. female who presents to the Emergency Department brought in by ambulance from home secondary to GLF this morning. Per patient, over the past week she has been increasingly generally weak and today lost her footing at which point she fell and landed on her knees. Additionally, she endorses decreased appetite and states she has been having to force herself tot eat. Otherwise, she has been having normal bowel movements and denies any fever, rhinorrhea, cough, chest pain, abdominal pain, nausea, emesis, or dysuria. She also denies any resulting trauma or injuries from her fall this morning and denies hitting oliver head. She lives alone.     REVIEW OF SYSTEMS  Pertinent positives include GLF, generalized weakness, decreased appetite. Pertinent negatives include no fever, rhinorrhea, cough, chest pain, abdominal pain, nausea, emesis, dysuria, trauma, head injujry. All other systems reviewed and negative.    PAST MEDICAL HISTORY  Patient has a past medical history of CAD (coronary artery disease), CATARACT, CHF (congestive heart failure) (HCC), Cystocele, Diabetes, DJD (degenerative joint disease), HTN (hypertension), benign, Hyperlipidemia, Hypertension, Medical home,  "Myocardial infarct (HCC) (2000), PVD (peripheral vascular disease) (HCC), Renal disorder, Unspecified urinary incontinence, and Urinary bladder disorder.    SURGICAL HISTORY  Patient has a past surgical history that includes abdominal hysterectomy total; multiple coronary artery bypass (2004); and cataract phaco with iol (3/12/2014).    SOCIAL HISTORY  Social History     Tobacco Use   • Smoking status: Never Smoker   • Smokeless tobacco: Never Used   Substance Use Topics   • Alcohol use: No   • Drug use: No      Social History     Substance and Sexual Activity   Drug Use No       FAMILY HISTORY  Family History   Problem Relation Age of Onset   • Hypertension Mother    • Heart Disease Mother    • No Known Problems Maternal Grandmother    • No Known Problems Maternal Grandfather    • Diabetes Daughter    • Seizures Son    • No Known Problems Son    • Heart Disease Son        CURRENT MEDICATIONS  Home Medications     Reviewed by Molly Cheng on 02/09/21 at 1726  Med List Status: Complete   Medication Last Dose Status   apixaban (ELIQUIS) 2.5mg Tab 2/9/2021 Active   atorvastatin (LIPITOR) 80 MG tablet 2/8/2021 Active   carvedilol (COREG) 6.25 MG Tab 2/9/2021 Active   lisinopril (PRINIVIL) 20 MG Tab 2/9/2021 Active   spironolactone (ALDACTONE) 25 MG Tab 2/9/2021 Active   torsemide (DEMADEX) 100 MG Tab 2/9/2021 Active                ALLERGIES  Allergies   Allergen Reactions   • Tetracaine Hcl [Tetcaine]    • Vancomycin    • Vigamox [Moxifloxacin]        PHYSICAL EXAM  VITAL SIGNS: /57   Pulse (!) 52   Resp 18   Ht 1.499 m (4' 11\")   Wt 45.4 kg (100 lb)   SpO2 100%   BMI 20.20 kg/m²     Constitutional: Cachectic appearing, Mild distress, Non-toxic appearance.   HENT: Normocephalic, Atraumatic, Bilateral external ears normal, Oropharynx moist, No oral exudates.   Eyes: PERRLA, EOMI, Conjunctiva normal, No discharge.   Neck: No tenderness, Supple, No stridor.   Lymphatic: No lymphadenopathy noted. "   Cardiovascular: Normal heart rate, Normal rhythm.   Thorax & Lungs: Clear to auscultation bilaterally, No respiratory distress, Questionable end expiratory wheezing, Crackles to bilateral bases.   Abdomen: Soft, No tenderness, No masses, No pulsatile masses.   Skin: Large anterior scar to chest. Warm, Dry, No erythema, No rash.   Extremities: Trace lower extremity edema. No cyanosis.   Musculoskeletal: No tenderness to palpation or major deformities noted.  Intact distal pulses  Neurologic: Awake, alert. Moves all extremities spontaneously.  Psychiatric: Affect normal, Judgment normal, Mood normal.     LABS  Results for orders placed or performed during the hospital encounter of 02/09/21   LACTIC ACID   Result Value Ref Range    Lactic Acid 2.0 0.5 - 2.0 mmol/L   CBC WITH DIFFERENTIAL   Result Value Ref Range    WBC 10.2 4.8 - 10.8 K/uL    RBC 4.80 4.20 - 5.40 M/uL    Hemoglobin 14.9 12.0 - 16.0 g/dL    Hematocrit 44.9 37.0 - 47.0 %    MCV 93.5 81.4 - 97.8 fL    MCH 31.0 27.0 - 33.0 pg    MCHC 33.2 (L) 33.6 - 35.0 g/dL    RDW 46.8 35.9 - 50.0 fL    Platelet Count 361 164 - 446 K/uL    MPV 10.4 9.0 - 12.9 fL    Neutrophils-Polys 80.30 (H) 44.00 - 72.00 %    Lymphocytes 11.60 (L) 22.00 - 41.00 %    Monocytes 6.70 0.00 - 13.40 %    Eosinophils 0.60 0.00 - 6.90 %    Basophils 0.30 0.00 - 1.80 %    Immature Granulocytes 0.50 0.00 - 0.90 %    Nucleated RBC 0.00 /100 WBC    Neutrophils (Absolute) 8.15 (H) 2.00 - 7.15 K/uL    Lymphs (Absolute) 1.18 1.00 - 4.80 K/uL    Monos (Absolute) 0.68 0.00 - 0.85 K/uL    Eos (Absolute) 0.06 0.00 - 0.51 K/uL    Baso (Absolute) 0.03 0.00 - 0.12 K/uL    Immature Granulocytes (abs) 0.05 0.00 - 0.11 K/uL    NRBC (Absolute) 0.00 K/uL   COMP METABOLIC PANEL   Result Value Ref Range    Sodium 126 (L) 135 - 145 mmol/L    Potassium 6.6 (HH) 3.6 - 5.5 mmol/L    Chloride 88 (L) 96 - 112 mmol/L    Co2 22 20 - 33 mmol/L    Anion Gap 16.0 7.0 - 16.0    Glucose 222 (H) 65 - 99 mg/dL    Bun 147 (HH)  8 - 22 mg/dL    Creatinine 3.67 (H) 0.50 - 1.40 mg/dL    Calcium 11.0 (H) 8.5 - 10.5 mg/dL    AST(SGOT) 22 12 - 45 U/L    ALT(SGPT) 26 2 - 50 U/L    Alkaline Phosphatase 117 (H) 30 - 99 U/L    Total Bilirubin 0.6 0.1 - 1.5 mg/dL    Albumin 4.1 3.2 - 4.9 g/dL    Total Protein 8.1 6.0 - 8.2 g/dL    Globulin 4.0 (H) 1.9 - 3.5 g/dL    A-G Ratio 1.0 g/dL   URINALYSIS    Specimen: Urine   Result Value Ref Range    Color Yellow     Character Cloudy (A)     Specific Gravity 1.011 <1.035    Ph 5.5 5.0 - 8.0    Glucose Negative Negative mg/dL    Ketones Negative Negative mg/dL    Protein 100 (A) Negative mg/dL    Bilirubin Negative Negative    Urobilinogen, Urine 0.2 Negative    Nitrite Negative Negative    Leukocyte Esterase Large (A) Negative    Occult Blood Negative Negative    Micro Urine Req Microscopic    TROPONIN   Result Value Ref Range    Troponin T 81 (H) 6 - 19 ng/L   URINE MICROSCOPIC (W/UA)   Result Value Ref Range    WBC 20-50 (A) /hpf    RBC 0-2 /hpf    Bacteria Moderate (A) None /hpf    Epithelial Cells Negative /hpf    Hyaline Cast 0-2 /lpf   ESTIMATED GFR   Result Value Ref Range    GFR If  14 (A) >60 mL/min/1.73 m 2    GFR If Non  12 (A) >60 mL/min/1.73 m 2   SARS-CoV-2 PCR (24 hour In-House): Collect NP swab in [facility]    Specimen: Respirate   Result Value Ref Range    SARS-CoV-2 Source NP Swab    ACCU-CHEK GLUCOSE   Result Value Ref Range    Glucose - Accu-Ck 262 (H) 65 - 99 mg/dL   EKG   Result Value Ref Range    Report       [facility] Emergency Dept.    Test Date:  2021  Pt Name:    MORRO PINZON            Department: ER  MRN:        [id number]                      Room:       [facility]  Gender:     Female                       Technician: [id number]  :        [id number]                   Requested By:VIPIN COLUNGA  Order #:    [id number]                    Reading MD: VIPIN COLUNGA MD    Measurements  Intervals                                 Axis  Rate:       52                           P:          72  TN:         252                          QRS:        67  QRSD:       113                          T:          134  QT:         426  QTc:        396    Interpretive Statements  Sinus bradycardia  Prolonged TN interval  LVH with secondary repolarization abnormality  Compared to ECG 09/11/2020 12:10:27  Atrial premature complex(es) no longer present  Intraventricular conduction delay no longer present  Myocardial infarct finding no longer present  Electronically Signed On 2-9- 2021 16:53:45 PST by VIPIN COLUNGA MD       All labs reviewed by me. EKG reviewed and signed as shown above.      RADIOLOGY  DX-CHEST-PORTABLE (1 VIEW)   Final Result      No acute cardiopulmonary abnormality identified.      DX-CHEST-FOR LINE PLACEMENT Perform procedure in: Patient's Room    (Results Pending)   EC-ECHOCARDIOGRAM COMPLETE W/O CONT    (Results Pending)     The radiologist's interpretation of all radiological studies have been reviewed by me.      COURSE & MEDICAL DECISION MAKING  Pertinent Labs & Imaging studies reviewed. (See chart for details)    I reviewed the patient's medical records which showed a past medical history of CAD, hypertension, CHF, and diabetes. Patient was switched from Furosemide to Torsemide at the beginning of last month.     2:24 PM - Patient seen and examined at bedside. Discussed with patient that since she lives alone and given her new weakness and risk for falls, she will need to be hospitalized. Patient will be treated with IV fluids, D50W 50 mL, and insulin injection. Ordered DX--chest, SARS-CoV, lactic acid, urine microscope, estimated GFR, CBC with differential, CMP, urinalysis, urine culture, blood culture, troponin, and EKG to evaluate her symptoms. The differential diagnoses include but are not limited to: sepsis, electrolyte abnormality, renal failure.     4:56 PM - Paged hospitalist to discuss patient's case and  hospitalization.     CRITICAL CARE  The very real possibility of a deterioration of this patient's condition required the highest level of my preparedness for sudden, emergent intervention.  I provided critical care services, which included medication orders, frequent reevaluations of the patient's condition and response to treatment, ordering and reviewing test results, and discussing the case with various consultants.  The critical care time associated with the care of the patient was 35 minutes. Review chart for interventions. This time is exclusive of any other billable procedures.     Decision Making:  Patient with generalized weakness, malaise, fall unable to take care of self.  Laboratory tests patient has hyperkalemia, acute injury, likely secondary to overuse of diuretic.  Patient did have transient hypotension, fluid resuscitated the patient, the patient will be admitted to the ICU, discussed case with Dr. Suggs for hospitalization.  The patient did receive insulin and D50 for hyperkalemia.    HYDRATION: Based on the patient's presentation of Hypotension the patient was given IV fluids. IV Hydration was used because oral hydration was not as rapid as required. Upon recheck following hydration, the patient was Improved.    DISPOSITION:  Patient will be hospitalized by Dr. Suggs in guarded condition.      FINAL IMPRESSION  Performed 35 minutes of Critical Care Time  1. Weakness    2. JOSE C (acute kidney injury) (HCC)    3. Hyperkalemia          Elda HALL (Stefano), am scribing for, and in the presence of, Kaleb Sorensen M.D..    Electronically signed by: Elda Avilez (Stefano), 2/9/2021    Kaleb HALL M.D. personally performed the services described in this documentation, as scribed by Elda Avilez in my presence, and it is both accurate and complete. C    The note accurately reflects work and decisions made by me.  Kaleb Sorensen M.D.  2/9/2021  9:53 PM

## 2021-02-09 NOTE — TELEPHONE ENCOUNTER
PRE-VISIT PLANNING    1.  Reviewed notes from the last office visit: Yes    2.  If any orders were ordered or intended to be done prior to visit (i.e. 6 mos follow-up), do we have results/consult notes or has patient scheduled?        •  Labs - Labs ordered, completed on 1/11/2021 and results are in chart.  Note: If patient appointment is for lab review and patient did not complete labs, check with provider if OK to reschedule patient until labs completed.       •  Imaging - Imaging was not ordered at last office visit.       •  Referrals - Referral ordered, patient was seen and consult notes are in chart. Care Teams updated  YES.    3.  Immunizations were updated in Epic using Reconcile Outside Information activity? Yes       •  Is patient due for Tdap? YES. Patient was not notified of copay/out of pocket cost.       •  Is patient due for Shingrix? YES. Patient was not notified of copay/out of pocket cost.    4.  Patient is due for the following Health Maintenance Topics:   Health Maintenance Due   Topic Date Due   • COVID-19 Vaccine (1 of 2)    • IMM HEP B VACCINE (1 of 3 - Risk 3-dose series) 06/22/1954   •; IMM DTaP/Tdap/Td Vaccine (1 - Tdap) 06/22/1954   • BONE DENSITY  01/23/2013   • RETINAL SCREENING  04/06/2016   • IMM ZOSTER VACCINES (2 of 3) 11/26/2016   • URINE ACR / MICROALBUMIN  08/03/2018   • DIABETES MONOFILAMENT / LE EXAM  04/10/2020   • FASTING LIPID PROFILE  ordered   • Annual Wellness Visit  scheduled       - Patient is up-to-date on all Health Maintenance topics. No records have been requested at this time.    5.  Reviewed/Updated the following with patient:       •   Preferred Pharmacy? Yes       •   Preferred Lab? Yes       •   Preferred Communication? Yes       •   Allergies? Yes       •   Medications? YES. Was Abstract Encounter opened and chart updated? NO       •   Social History? No       •   Family History (document living status of immediate family members and if + hx of  cancer,  diabetes, hypertension, hyperlipidemia, heart attack, stroke) Yes    6.  Care Team Updated:       •   DME Company (gait device, O2, CPAP, etc.): YES       •   Other Specialists (eye doctor, derm, GYN, cardiology, endo, etc): YES    7.  Patient was not advised: “This is a free wellness visit. The provider will screen for medical conditions to help you stay healthy. If you have other concerns to address you may be asked to discuss these at a separate visit or there may be an additional fee.”     8.  AHA (Puls[employer]) form printed for Provider? Yes

## 2021-02-10 ENCOUNTER — APPOINTMENT (OUTPATIENT)
Dept: CARDIOLOGY | Facility: MEDICAL CENTER | Age: 86
DRG: 853 | End: 2021-02-10
Attending: HOSPITALIST
Payer: MEDICARE

## 2021-02-10 ENCOUNTER — APPOINTMENT (OUTPATIENT)
Dept: CARDIOLOGY | Facility: MEDICAL CENTER | Age: 86
End: 2021-02-10
Attending: NURSE PRACTITIONER
Payer: MEDICARE

## 2021-02-10 PROBLEM — I35.0 NONRHEUMATIC AORTIC VALVE STENOSIS: Status: ACTIVE | Noted: 2021-02-10

## 2021-02-10 PROBLEM — R65.21 SEVERE SEPSIS WITH SEPTIC SHOCK (HCC): Status: ACTIVE | Noted: 2021-02-09

## 2021-02-10 LAB
ANION GAP SERPL CALC-SCNC: 11 MMOL/L (ref 7–16)
ANION GAP SERPL CALC-SCNC: 12 MMOL/L (ref 7–16)
BASOPHILS # BLD AUTO: 0.4 % (ref 0–1.8)
BASOPHILS # BLD: 0.04 K/UL (ref 0–0.12)
BUN SERPL-MCNC: 127 MG/DL (ref 8–22)
BUN SERPL-MCNC: 127 MG/DL (ref 8–22)
CALCIUM SERPL-MCNC: 9.3 MG/DL (ref 8.5–10.5)
CALCIUM SERPL-MCNC: 9.3 MG/DL (ref 8.5–10.5)
CHLORIDE SERPL-SCNC: 100 MMOL/L (ref 96–112)
CHLORIDE SERPL-SCNC: 98 MMOL/L (ref 96–112)
CO2 SERPL-SCNC: 19 MMOL/L (ref 20–33)
CO2 SERPL-SCNC: 20 MMOL/L (ref 20–33)
CREAT SERPL-MCNC: 2.45 MG/DL (ref 0.5–1.4)
CREAT SERPL-MCNC: 2.47 MG/DL (ref 0.5–1.4)
EOSINOPHIL # BLD AUTO: 0.06 K/UL (ref 0–0.51)
EOSINOPHIL NFR BLD: 0.6 % (ref 0–6.9)
ERYTHROCYTE [DISTWIDTH] IN BLOOD BY AUTOMATED COUNT: 47.1 FL (ref 35.9–50)
GLUCOSE SERPL-MCNC: 132 MG/DL (ref 65–99)
GLUCOSE SERPL-MCNC: 156 MG/DL (ref 65–99)
HCT VFR BLD AUTO: 37 % (ref 37–47)
HGB BLD-MCNC: 12.7 G/DL (ref 12–16)
IMM GRANULOCYTES # BLD AUTO: 0.06 K/UL (ref 0–0.11)
IMM GRANULOCYTES NFR BLD AUTO: 0.6 % (ref 0–0.9)
LACTATE BLD-SCNC: 1.1 MMOL/L (ref 0.5–2)
LACTATE BLD-SCNC: 1.5 MMOL/L (ref 0.5–2)
LYMPHOCYTES # BLD AUTO: 1.18 K/UL (ref 1–4.8)
LYMPHOCYTES NFR BLD: 11.7 % (ref 22–41)
MCH RBC QN AUTO: 32.3 PG (ref 27–33)
MCHC RBC AUTO-ENTMCNC: 34.3 G/DL (ref 33.6–35)
MCV RBC AUTO: 94.1 FL (ref 81.4–97.8)
MONOCYTES # BLD AUTO: 0.93 K/UL (ref 0–0.85)
MONOCYTES NFR BLD AUTO: 9.2 % (ref 0–13.4)
NEUTROPHILS # BLD AUTO: 7.82 K/UL (ref 2–7.15)
NEUTROPHILS NFR BLD: 77.5 % (ref 44–72)
NRBC # BLD AUTO: 0 K/UL
NRBC BLD-RTO: 0 /100 WBC
PLATELET # BLD AUTO: 270 K/UL (ref 164–446)
PMV BLD AUTO: 10.3 FL (ref 9–12.9)
POTASSIUM SERPL-SCNC: 4.7 MMOL/L (ref 3.6–5.5)
POTASSIUM SERPL-SCNC: 5.4 MMOL/L (ref 3.6–5.5)
POTASSIUM SERPL-SCNC: 5.6 MMOL/L (ref 3.6–5.5)
PROCALCITONIN SERPL-MCNC: 0.09 NG/ML
RBC # BLD AUTO: 3.93 M/UL (ref 4.2–5.4)
SARS-COV-2 RNA RESP QL NAA+PROBE: NOTDETECTED
SODIUM SERPL-SCNC: 129 MMOL/L (ref 135–145)
SODIUM SERPL-SCNC: 131 MMOL/L (ref 135–145)
SPECIMEN SOURCE: NORMAL
WBC # BLD AUTO: 10.1 K/UL (ref 4.8–10.8)

## 2021-02-10 PROCEDURE — 80048 BASIC METABOLIC PNL TOTAL CA: CPT | Mod: 91

## 2021-02-10 PROCEDURE — 700105 HCHG RX REV CODE 258: Performed by: HOSPITALIST

## 2021-02-10 PROCEDURE — 36573 INSJ PICC RS&I 5 YR+: CPT | Mod: LT | Performed by: INTERNAL MEDICINE

## 2021-02-10 PROCEDURE — 83605 ASSAY OF LACTIC ACID: CPT | Mod: 91

## 2021-02-10 PROCEDURE — 700111 HCHG RX REV CODE 636 W/ 250 OVERRIDE (IP): Performed by: HOSPITALIST

## 2021-02-10 PROCEDURE — 700111 HCHG RX REV CODE 636 W/ 250 OVERRIDE (IP): Performed by: INTERNAL MEDICINE

## 2021-02-10 PROCEDURE — 770022 HCHG ROOM/CARE - ICU (200)

## 2021-02-10 PROCEDURE — 700105 HCHG RX REV CODE 258: Performed by: INTERNAL MEDICINE

## 2021-02-10 PROCEDURE — 36556 INSERT NON-TUNNEL CV CATH: CPT

## 2021-02-10 PROCEDURE — 700102 HCHG RX REV CODE 250 W/ 637 OVERRIDE(OP): Performed by: INTERNAL MEDICINE

## 2021-02-10 PROCEDURE — A9270 NON-COVERED ITEM OR SERVICE: HCPCS | Performed by: INTERNAL MEDICINE

## 2021-02-10 PROCEDURE — 85025 COMPLETE CBC W/AUTO DIFF WBC: CPT

## 2021-02-10 PROCEDURE — 99291 CRITICAL CARE FIRST HOUR: CPT | Performed by: INTERNAL MEDICINE

## 2021-02-10 PROCEDURE — 05HY33Z INSERTION OF INFUSION DEVICE INTO UPPER VEIN, PERCUTANEOUS APPROACH: ICD-10-PCS | Performed by: INTERNAL MEDICINE

## 2021-02-10 PROCEDURE — 84132 ASSAY OF SERUM POTASSIUM: CPT

## 2021-02-10 RX ORDER — ATORVASTATIN CALCIUM 80 MG/1
80 TABLET, FILM COATED ORAL EVERY EVENING
Status: DISCONTINUED | OUTPATIENT
Start: 2021-02-10 | End: 2021-02-12

## 2021-02-10 RX ORDER — SODIUM CHLORIDE 9 MG/ML
500 INJECTION, SOLUTION INTRAVENOUS ONCE
Status: COMPLETED | OUTPATIENT
Start: 2021-02-10 | End: 2021-02-10

## 2021-02-10 RX ORDER — SODIUM CHLORIDE 9 MG/ML
INJECTION, SOLUTION INTRAVENOUS CONTINUOUS
Status: DISCONTINUED | OUTPATIENT
Start: 2021-02-10 | End: 2021-02-11

## 2021-02-10 RX ADMIN — SODIUM CHLORIDE: 9 INJECTION, SOLUTION INTRAVENOUS at 17:14

## 2021-02-10 RX ADMIN — HEPARIN SODIUM 5000 UNITS: 5000 INJECTION, SOLUTION INTRAVENOUS; SUBCUTANEOUS at 05:41

## 2021-02-10 RX ADMIN — HEPARIN SODIUM 5000 UNITS: 5000 INJECTION, SOLUTION INTRAVENOUS; SUBCUTANEOUS at 14:00

## 2021-02-10 RX ADMIN — CEFTRIAXONE SODIUM 1 G: 1 INJECTION, POWDER, FOR SOLUTION INTRAMUSCULAR; INTRAVENOUS at 17:14

## 2021-02-10 RX ADMIN — SODIUM CHLORIDE: 9 INJECTION, SOLUTION INTRAVENOUS at 10:02

## 2021-02-10 RX ADMIN — ATORVASTATIN CALCIUM 80 MG: 80 TABLET, FILM COATED ORAL at 18:30

## 2021-02-10 RX ADMIN — HEPARIN SODIUM 5000 UNITS: 5000 INJECTION, SOLUTION INTRAVENOUS; SUBCUTANEOUS at 21:18

## 2021-02-10 RX ADMIN — SODIUM CHLORIDE, POTASSIUM CHLORIDE, SODIUM LACTATE AND CALCIUM CHLORIDE: 600; 310; 30; 20 INJECTION, SOLUTION INTRAVENOUS at 04:13

## 2021-02-10 RX ADMIN — SODIUM CHLORIDE 500 ML: 9 INJECTION, SOLUTION INTRAVENOUS at 10:02

## 2021-02-10 ASSESSMENT — ENCOUNTER SYMPTOMS
ABDOMINAL PAIN: 0
NAUSEA: 0
NERVOUS/ANXIOUS: 0
BRUISES/BLEEDS EASILY: 0
SHORTNESS OF BREATH: 0
EYE DISCHARGE: 0
EYE REDNESS: 0
HEMOPTYSIS: 0
CHILLS: 1
HALLUCINATIONS: 0
FALLS: 1
FEVER: 0
CHILLS: 0
COUGH: 0
WEAKNESS: 1
PALPITATIONS: 0
EYE PAIN: 0
NECK PAIN: 0
MYALGIAS: 0
VOMITING: 0
HEADACHES: 0
STRIDOR: 0
FOCAL WEAKNESS: 0

## 2021-02-10 ASSESSMENT — LIFESTYLE VARIABLES
TOTAL SCORE: 0
HAVE PEOPLE ANNOYED YOU BY CRITICIZING YOUR DRINKING: NO
EVER HAD A DRINK FIRST THING IN THE MORNING TO STEADY YOUR NERVES TO GET RID OF A HANGOVER: NO
HAVE YOU EVER FELT YOU SHOULD CUT DOWN ON YOUR DRINKING: NO
AVERAGE NUMBER OF DAYS PER WEEK YOU HAVE A DRINK CONTAINING ALCOHOL: 0
ALCOHOL_USE: NO
SUBSTANCE_ABUSE: 0
HOW MANY TIMES IN THE PAST YEAR HAVE YOU HAD 5 OR MORE DRINKS IN A DAY: 0
CONSUMPTION TOTAL: NEGATIVE
ON A TYPICAL DAY WHEN YOU DRINK ALCOHOL HOW MANY DRINKS DO YOU HAVE: 0
TOTAL SCORE: 0
EVER FELT BAD OR GUILTY ABOUT YOUR DRINKING: NO
TOTAL SCORE: 0

## 2021-02-10 ASSESSMENT — FIBROSIS 4 INDEX: FIB4 SCORE: 1.33

## 2021-02-10 NOTE — ASSESSMENT & PLAN NOTE
Present on admission  Associated acute kidney injury  Urinary source  Shock improved with intravenous fluids  Continue source directed antimicrobial chemotherapy

## 2021-02-10 NOTE — DISCHARGE PLANNING
Care Transition Team Assessment  Left  for son Jony [phone]. Spoke w/ pt at bedside and verified facesheet info. Pt states she lives alone, has Meals on Wheels, and has assistance from son 2-3 times/week. Pt states she's had falls at home because her feet got tangled. PCP Rich Coffey. Asked pt if she would be willing to consider group home. Pt states she was at a group home in the past but doesn't remember name of group home or how much she paid for it. Pt states she didn't like living in a group home. Pt states she receives income from social security but doesn't remember exact amount. Pt may be unsafe to return home as pt lives alone and has falls.    Information Source  Orientation : Oriented x 4  Information Given By: Patient         Elopement Risk  Elopement Risk: Not at Risk for Elopement    Interdisciplinary Discharge Planning  Primary Care Physician: Rich Coffey  Lives with - Patient's Self Care Capacity: Alone and Able to Care For Self  Support Systems: Children, Friends / Neighbors  Able to Return to Previous ADL's: Yes  Mobility Issues: Yes  Prior Services: Meals on Wheels  Assistance Needed: Unknown at this Time  Durable Medical Equipment: Unknown    Discharge Preparedness  What is your plan after discharge?: Uncertain - pending medical team collaboration  What are your discharge supports?: Child  Prior Functional Level: Needs Assist with Activities of Daily Living    Functional Assesment  Prior Functional Level: Needs Assist with Activities of Daily Living    Finances  Financial Barriers to Discharge: No              Advance Directive  Advance Directive?: None    Domestic Abuse  Have you ever been the victim of abuse or violence?: No  Physical Abuse or Sexual Abuse: No  Verbal Abuse or Emotional Abuse: No  Possible Abuse/Neglect Reported to:: Not Applicable         Discharge Risks or Barriers  Patient risk factors: Vulnerable adult    Anticipated Discharge Information  Discharge Address:  [address] [address]  Discharge Contact Phone Number: [phone]

## 2021-02-10 NOTE — ASSESSMENT & PLAN NOTE
Restarted lisinopril  2/12.  Amlodipine added  Monitor bmp and vitals.  Hold aldactone and torsemide for now (not volume overloaded at this time).

## 2021-02-10 NOTE — PROGRESS NOTES
2000 pt tx from ER to room 608 via stretcher by myself & Eataly Net Percy. No complications.     2015 Discussed access with Dr. Monster Vidales due to bolus need, sepsis bundle and pt stuck multiple times with ultrasound in ER. Current access 22g in left arm.     2230 Dr. Vidales at bedside attempting to place IJ central line. See his notes.     2209 order placed for line placement.     2300 Left neck line Line confirmed as midline NOT CENTRAL LINE ACCESS per Dr. Vidales    Monitor Summary  SB-SR with freq PVC  52-60bpm  0.28/0.12/0.40

## 2021-02-10 NOTE — HEART FAILURE PROGRAM
"Per Dr. Soler's note on 2/9/21, this patient's HFrEF is not acutely decompensated. Therefore, a seven day HF f/u appt is not currently indicated. Nor does the HF discharge checklist need to be used.    Patient is being primarily treated for sepsis and given this will need to be monitored very closely for s/s of fluid overload.    Should clinical status change to acute HF, patient will require a seven calendar day f/u appt which can be achieved by placing a \"schedule heart failure follow up appointment\" order per protocol or by calling the hospital schedulers at [phone].      Thank you, Denise Cardio RN Navigator [phone]        "

## 2021-02-10 NOTE — H&P
Hospital Medicine History & Physical Note    Date of Service  2/9/2021    Primary Care Physician  Rich Coffey M.D.    Consultants  ICU intensivist, Dr. Vidales    Code Status  Full Code discussed with patient on admission.  She wishes to be full code.    Chief Complaint  Chief Complaint   Patient presents with   • Weakness     Pt is complaining of increased weakness over the last three days. Pt states she has lost 10lbs over the last week. Pt usually ambulates on her own with a steady gait. Pt lives by herself and since becoming weak, patient has been having episodes of incontinence.    • GLF     Pt fell onto her knees today and used her med alert button. EMS found pt on her knees. Pt denies hitting her head. Pt taking eliquis.        History of Presenting Illness  85 y.o. female who presented 2/9/2021 with history of CHF on torsemide and spironolactone for many years, diabetes, coronary artery disease, hypertension, peripheral vascular disease presents to the ER in acute renal failure.  Potassium 6.6 creatinine 3.67.  The patient states she came to the ER because she fell and was feeling very weak over the last 3 days.  She declines hitting her head she states she fell to her knees.  She denies any fever chills.  In the ER she was found to have white blood cells in her urine 20-50.  Patient became very hypotensive while in the ER with blood pressure 60 over 40s repeatedly.  She was started on IV fluids 1 L bolus however her blood pressures remain soft as low as 60 over 40s.  ERP did give D50 and insulin for elevated potassium and peaked T waves.  I did not give calcium chloride since her calcium level was 11.1.  I did start patient on sepsis protocol with lactated Ringer's.  Blood cultures x2 urine culture lactic acid remain 2.0.  COVID-19 testing pending.  She will be admitted to ICU given her sepsis hypotension despite IV fluid challenge.  Patient was a poor IV access.  She will likely need central  line placement.  I did order PICC line placement.  Overall, patient was able to answer questions appropriately despite blood pressure 60/40.  She did not appear toxic.  She denies ever being on dialysis in the past.  She states she was eating normally and has not had any vomiting or diarrhea.  She denies cough fever or chills.    Review of Systems  Review of Systems   Constitutional: Positive for malaise/fatigue. Negative for chills, diaphoresis and fever.   HENT: Negative for congestion and sore throat.    Eyes: Negative for pain and discharge.   Respiratory: Negative for cough, hemoptysis, sputum production and wheezing.    Cardiovascular: Negative for chest pain, palpitations, claudication and leg swelling.   Gastrointestinal: Negative for abdominal pain, constipation, diarrhea, melena, nausea and vomiting.   Genitourinary: Negative for dysuria, frequency and urgency.   Musculoskeletal: Negative for back pain, joint pain, myalgias and neck pain.   Skin: Negative for itching and rash.   Neurological: Positive for weakness. Negative for dizziness, sensory change, speech change, focal weakness, loss of consciousness and headaches.   Endo/Heme/Allergies: Does not bruise/bleed easily.   Psychiatric/Behavioral: Negative for depression, substance abuse and suicidal ideas.       Past Medical History   has a past medical history of CAD (coronary artery disease), CATARACT, CHF (congestive heart failure) ([facility]), Cystocele, Diabetes, DJD (degenerative joint disease), HTN (hypertension), benign, Hyperlipidemia, Hypertension, Medical home, Myocardial infarct (HCC) (2000), PVD (peripheral vascular disease) ([facility]), Renal disorder, Unspecified urinary incontinence, and Urinary bladder disorder.    Surgical History   has a past surgical history that includes abdominal hysterectomy total; multiple coronary artery bypass (2004); and cataract phaco with iol (3/12/2014).     Family History  family history includes Diabetes in her  daughter; Heart Disease in her mother and son; Hypertension in her mother; No Known Problems in her maternal grandfather, maternal grandmother, and son; Seizures in her son.     Social History   reports that she has never smoked. She has never used smokeless tobacco. She reports that she does not drink alcohol or use drugs.  Patient states she lives alone.  She does use a walker at home no oxygen previously.  Her  passed away a few years ago.  Allergies  Allergies   Allergen Reactions   • Tetracaine Hcl [Tetcaine]    • Vancomycin    • Vigamox [Moxifloxacin]        Medications  Prior to Admission Medications   Prescriptions Last Dose Informant Patient Reported? Taking?   apixaban (ELIQUIS) 2.5mg Tab 2/9/2021 at 0830 Patient No No   Sig: Take 1 Tab by mouth 2 Times a Day.   atorvastatin (LIPITOR) 80 MG tablet 2/8/2021 at 1930 Patient No No   Sig: Take 1 Tab by mouth every evening.   carvedilol (COREG) 6.25 MG Tab 2/9/2021 at 0830 Patient No No   Sig: TAKE ONE TABLET BY MOUTH TWICE A DAY WITH MEALS   lisinopril (PRINIVIL) 20 MG Tab 2/9/2021 at 0830 Patient No No   Sig: Take 1 Tab by mouth every day.   spironolactone (ALDACTONE) 25 MG Tab 2/9/2021 at 0830 Patient No No   Sig: Take 1 Tab by mouth every day.   torsemide (DEMADEX) 100 MG Tab 2/9/2021 at 0830 Patient No No   Sig: Take 1 Tab by mouth every day.      Facility-Administered Medications: None       Physical Exam  Pulse:  [51-60] 51  Resp:  [14-50] 17  BP: ()/(35-63) 112/63  SpO2:  [98 %-100 %] 100 %    Physical Exam  Vitals signs and nursing note reviewed.   Constitutional:       General: She is not in acute distress.     Appearance: She is well-developed. She is not diaphoretic.      Comments: Frail appearance   HENT:      Head: Normocephalic and atraumatic.      Nose: Nose normal.      Mouth/Throat:      Pharynx: No oropharyngeal exudate.   Eyes:      General: No scleral icterus.        Right eye: No discharge.         Left eye: No discharge.       Conjunctiva/sclera: Conjunctivae normal.      Pupils: Pupils are equal, round, and reactive to light.   Neck:      Musculoskeletal: Normal range of motion and neck supple.      Thyroid: No thyromegaly.      Vascular: No JVD.      Trachea: No tracheal deviation.   Cardiovascular:      Rate and Rhythm: Normal rate and regular rhythm.      Heart sounds: Normal heart sounds. No murmur. No friction rub. No gallop.    Pulmonary:      Effort: Pulmonary effort is normal. No respiratory distress.      Breath sounds: Normal breath sounds. No stridor. No wheezing or rales.   Chest:      Chest wall: No tenderness.   Abdominal:      General: Bowel sounds are normal. There is no distension.      Palpations: Abdomen is soft. There is no mass.      Tenderness: There is no abdominal tenderness. There is no guarding or rebound.   Musculoskeletal: Normal range of motion.         General: No tenderness.   Lymphadenopathy:      Cervical: No cervical adenopathy.   Skin:     General: Skin is warm and dry.      Findings: No erythema or rash.   Neurological:      Mental Status: She is alert and oriented to person, place, and time.      Cranial Nerves: No cranial nerve deficit.      Motor: No abnormal muscle tone.      Coordination: Coordination normal.   Psychiatric:         Behavior: Behavior normal.         Thought Content: Thought content normal.         Judgment: Judgment normal.         Laboratory:  Recent Labs     02/09/21  1520   WBC 10.2   RBC 4.80   HEMOGLOBIN 14.9   HEMATOCRIT 44.9   MCV 93.5   MCH 31.0   MCHC 33.2*   RDW 46.8   PLATELETCT 361   MPV 10.4     Recent Labs     02/09/21  1520   SODIUM 126*   POTASSIUM 6.6*   CHLORIDE 88*   CO2 22   GLUCOSE 222*   *   CREATININE 3.67*   CALCIUM 11.0*     Recent Labs     02/09/21  1520   ALTSGPT 26   ASTSGOT 22   ALKPHOSPHAT 117*   TBILIRUBIN 0.6   GLUCOSE 222*         No results for input(s): NTPROBNP in the last 72 hours.      Recent Labs     02/09/21  1520   TROPONINT 81*        Imaging:  DX-CHEST-PORTABLE (1 VIEW)   Final Result      No acute cardiopulmonary abnormality identified.      IR-PICC LINE PLACEMENT W/ GUIDANCE > AGE 5    (Results Pending)   DX-CHEST-FOR LINE PLACEMENT Perform procedure in: Patient's Room    (Results Pending)         Assessment/Plan:  I anticipate this patient will require at least two midnights for appropriate medical management, necessitating inpatient admission.    * Acute renal failure superimposed on stage 3 chronic kidney disease (HCC)- (present on admission)  Assessment & Plan  Creatinine 3.67 with potassium 6.6 and peaked T waves on EKG.  Stop patient's  Torsemide 100 mg daily and spironolactone 25 mg daily.  IV fluids  .  Patient with hypotension and clinically dry.  BMP every 6 hours.  D50 insulin given in ER.  IV fluid bolus 1 L.  I did not give calcium chloride since patient's calcium is elevated at 11.1.  Admission to ICU.  CO2 22.    HTN (hypertension)- (present on admission)  Assessment & Plan  Currently with hypotension.  Sepsis related.  Holding all blood pressure medications.    Hypercalcemia  Assessment & Plan  Elevated calcium 11.1.  Likely related to acute dehydration from diuretics.  BMP every 6 hours.  With IV fluids.      Hyperkalemia  Assessment & Plan  Elevated 6.6 on admission.  With peaked T waves.  Monitor in ICU with IV fluids D50 and insulin given in ER.    Sepsis associated hypotension (HCC)  Assessment & Plan  This is Septic shock Present on admission  SIRS criteria identified on my evaluation include: Hypothermia, with temperature less than 96.8 deg F  Source is urinary  Presentation includes: Severe sepsis present and persistent hypotension after 30 ml/kg completed.   Despite appropriate fluid resuscitation with crystalloid given per sepsis guidelines, the patient remains hypotensive with systolic blood pressure less than 90 or MAP less than 65  Hemodynamic support with additional fluids and IV vasopressors as needed to  maintain a SBP of 90 or MAP of 65  IV antibiotics as appropriate for source of sepsis  Reassessment: I have reassessed the patient's hemodynamic status has improved.  Patient with acute renal failure.  Requiring ICU admission.      Pulmonary hypertension ([facility])- (present on admission)  Assessment & Plan  History of pulmonary hypertension.  Echocardiogram ordered.    DM type 2 (diabetes mellitus, type 2) ([facility])- (present on admission)  Assessment & Plan  Sliding scale insulin ordered.  Hemoglobin A1c 6.5%.  Diabetic diet.    Acute systolic congestive heart failure, NYHA class 2 ([facility])- (present on admission)  Assessment & Plan  History of congestive heart failure on diuretics.  Monitor while off diuretics and given sepsis loading of IV fluids.    Lower urinary tract infectious disease- (present on admission)  Assessment & Plan  Urine WBC 20-50.  Urine culture ordered as well as blood cultures x2.  IV cefepime after cultures obtained.  Rocephin given by ERP.    CAD (coronary artery disease)- (present on admission)  Assessment & Plan  Patient on beta-blocker and ACE inhibitor Lipitor.  Holding current blood pressure medications due to sepsis associated hypotension.  ICU monitoring.

## 2021-02-10 NOTE — ASSESSMENT & PLAN NOTE
This is Septic shock Present on admission  SIRS criteria identified on my evaluation include: Hypothermia, with temperature less than 96.8 deg F  Source is urinary  Presentation includes: Severe sepsis present and persistent hypotension after 30 ml/kg completed.   Despite appropriate fluid resuscitation with crystalloid given per sepsis guidelines, the patient remains hypotensive with systolic blood pressure less than 90 or MAP less than 65  Hemodynamic support with additional fluids and IV vasopressors as needed to maintain a SBP of 90 or MAP of 65  IV antibiotics as appropriate for source of sepsis  Reassessment: I have reassessed the patient's hemodynamic status has improved.  Patient with acute renal failure.  Requiring ICU admission.

## 2021-02-10 NOTE — PROGRESS NOTES
Critical Care Progress Note    Date of admission  2/9/2021    Chief Complaint  85 y.o. female admitted 2/9/2021 with septic shock from a urinary source as well as JOSE C.  She has a history of chronic systolic heart failure, diabetes mellitus type 2, CAD with prior CABG, hypertension, dyslipidemia, atrial fibrillation, stage III CKD and PAD.    Hospital Course      2/10 -   cautious IV fluid resuscitation due to systolic heart failure.  Change cefepime to Rocephin.  Urine culture with Escherichia coli.      Interval Problem Update  Reviewed last 24 hour events:      SB-SR  Change LR to NS at 125  Change cefepime to Rocephin  98.3  -200 mL since admit      Review of Systems  Review of Systems   Constitutional: Positive for malaise/fatigue. Negative for chills and fever.   HENT: Negative for ear discharge and nosebleeds.    Eyes: Negative for pain, discharge and redness.   Respiratory: Negative for cough, hemoptysis, shortness of breath and stridor.    Cardiovascular: Negative for chest pain, palpitations and leg swelling.   Gastrointestinal: Negative for abdominal pain, nausea and vomiting.   Genitourinary: Negative for dysuria and urgency.   Musculoskeletal: Negative for myalgias and neck pain.   Skin: Negative for rash.   Neurological: Positive for weakness. Negative for focal weakness and headaches.   Endo/Heme/Allergies: Does not bruise/bleed easily.   Psychiatric/Behavioral: Negative for hallucinations and substance abuse. The patient is not nervous/anxious.         Vital Signs for last 24 hours   Temp:  [35.9 °C (96.6 °F)-36.8 °C (98.3 °F)] 35.9 °C (96.6 °F)  Pulse:  [51-68] 58  Resp:  [7-62] 14  BP: ()/(31-63) 125/49  SpO2:  [90 %-100 %] 96 %    Hemodynamic parameters for last 24 hours       Respiratory Information for the last 24 hours       Physical Exam   Physical Exam  Constitutional:       Appearance: She is not diaphoretic.   HENT:      Head: Normocephalic and atraumatic.      Right Ear: External ear  normal.      Left Ear: External ear normal.      Nose: Nose normal.      Mouth/Throat:      Mouth: Mucous membranes are dry.      Pharynx: Oropharynx is clear.   Eyes:      Conjunctiva/sclera: Conjunctivae normal.      Pupils: Pupils are equal, round, and reactive to light.   Cardiovascular:      Pulses: Normal pulses.      Heart sounds: Murmur present. No gallop.       Comments: Sinus rhythm  Pulmonary:      Breath sounds: No wheezing or rales.   Abdominal:      General: Bowel sounds are normal. There is no distension.      Tenderness: There is no abdominal tenderness. There is no guarding.   Musculoskeletal:         General: Normal range of motion.      Cervical back: Normal range of motion and neck supple.      Right lower leg: No edema.      Left lower leg: No edema.      Comments: No clubbing or cyanosis   Skin:     General: Skin is warm and dry.      Capillary Refill: Capillary refill takes less than 2 seconds.   Neurological:      Comments: Awake and alert.  Moves all 4.         Medications  Current Facility-Administered Medications   Medication Dose Route Frequency Provider Last Rate Last Admin   • cefTRIAXone (ROCEPHIN) 1 g in  mL IVPB  1 g Intravenous Q EVENING Cristian Kent M.D.   Stopped at 02/10/21 1744   • NS infusion   Intravenous Continuous Crisitan Kent M.D. 125 mL/hr at 02/10/21 1714 New Bag at 02/10/21 1714   • atorvastatin (LIPITOR) tablet 80 mg  80 mg Oral Q EVENING Cristian Kent M.D.       • senna-docusate (PERICOLACE or SENOKOT S) 8.6-50 MG per tablet 2 Tab  2 tablet Oral BID Tessa Soler M.D.        And   • polyethylene glycol/lytes (MIRALAX) PACKET 1 Packet  1 Packet Oral QDAY PRN Tessa Soler M.D.        And   • magnesium hydroxide (MILK OF MAGNESIA) suspension 30 mL  30 mL Oral QDAY PRN Tessa Soler M.D.        And   • bisacodyl (DULCOLAX) suppository 10 mg  10 mg Rectal QDAY PRN Tessa Soler M.D.       • heparin injection 5,000 Units  5,000 Units  Subcutaneous Q8HRS Tessa Soler M.D.   5,000 Units at 02/10/21 1400   • acetaminophen (Tylenol) tablet 650 mg  650 mg Oral Q6HRS PRN Tessa Soler M.D.           Fluids    Intake/Output Summary (Last 24 hours) at 2/10/2021 1818  Last data filed at 2/10/2021 1800  Gross per 24 hour   Intake 2225 ml   Output 1575 ml   Net 650 ml       Laboratory          Recent Labs     02/09/21  2250 02/10/21  0235 02/10/21  0545 02/10/21  1710   SODIUM 131* 129* 131*  --    POTASSIUM 5.4 5.4 5.6* 4.7   CHLORIDE 99 98 100  --    CO2 18* 19* 20  --    * 127* 127*  --    CREATININE 2.84* 2.47* 2.45*  --    MAGNESIUM 1.7  --   --   --    CALCIUM 9.2 9.3 9.3  --      Recent Labs     02/09/21  1520 02/09/21  2250 02/10/21  0235 02/10/21  0545   ALTSGPT 26  --   --   --    ASTSGOT 22  --   --   --    ALKPHOSPHAT 117*  --   --   --    TBILIRUBIN 0.6  --   --   --    GLUCOSE 222* 119* 132* 156*     Recent Labs     02/09/21  1520 02/10/21  0235   WBC 10.2 10.1   NEUTSPOLYS 80.30* 77.50*   LYMPHOCYTES 11.60* 11.70*   MONOCYTES 6.70 9.20   EOSINOPHILS 0.60 0.60   BASOPHILS 0.30 0.40   ASTSGOT 22  --    ALTSGPT 26  --    ALKPHOSPHAT 117*  --    TBILIRUBIN 0.6  --      Recent Labs     02/09/21  1520 02/09/21  2250 02/10/21  0235   RBC 4.80  --  3.93*   HEMOGLOBIN 14.9  --  12.7   HEMATOCRIT 44.9  --  37.0   PLATELETCT 361  --  270   PROTHROMBTM  --  16.4*  --    INR  --  1.28*  --        Imaging  X-Ray:  I have personally reviewed the images and compared with prior images. and My impression is: Clear lungs    Assessment/Plan  Hyperkalemia  Assessment & Plan  Improved  Monitor potassium closely    Severe sepsis with septic shock (HCC)  Assessment & Plan  Present on admission  Associated acute kidney injury  Urinary source  Shock improved with intravenous fluids  Continue source directed antimicrobial chemotherapy    Chronic systolic heart failure (HCC)- (present on admission)  Assessment & Plan  EF 30%  Currently compensated  Avoid volume  overload  Hold carvedilol, lisinopril, Aldactone and torsemide for now due to severe sepsis with septic shock    Nonrheumatic aortic valve stenosis  Assessment & Plan  Moderate AS by echocardiography    Pulmonary hypertension (HCC)- (present on admission)  Assessment & Plan  RVSP 85 mmHg    Chronic atrial fibrillation (HCC)- (present on admission)  Assessment & Plan  Currently in sinus rhythm  Optimize potassium and magnesium  Hold apixaban with JOSE C    DM type 2 (diabetes mellitus, type 2) ([facility])- (present on admission)  Assessment & Plan  Glycohemoglobin 6.5 approximately 4 months ago  Sliding scale insulin    Essential hypertension- (present on admission)  Assessment & Plan  Hold carvedilol, Aldactone and lisinopril    Acute cystitis without hematuria- (present on admission)  Assessment & Plan  Escherichia coli  Change cefepime to Rocephin    CAD (coronary artery disease)- (present on admission)  Assessment & Plan  Resume statin       VTE:  Heparin  Ulcer: Not Indicated  Lines: Central Line  Ongoing indication addressed and Pitts Catheter  Ongoing indication addressed    I have performed a physical exam and reviewed and updated ROS and Plan today (2/10/2021). In review of yesterday's note (2/9/2021), there are no changes except as documented above.     I have assessed and reassessed her respiratory status, blood pressure, hemodynamics, cardiovascular status as well as her neurologic status.  She is at increased risk for worsening respiratory and cardiovascular system dysfunction.    Discussed patient condition and risk of morbidity and/or mortality with RN, RT, Pharmacy, Charge nurse / hot rounds and QA team     The patient remains critically ill.  Critical care time = 35 minutes in directly providing and coordinating critical care and extensive data review.  No time overlap and excludes procedures.    Time spent is in addition of the time spent by Dr. Vidales earlier today.    Cristian Kent MD  Pulmonary  and Critical Care Medicine

## 2021-02-10 NOTE — CONSULTS
Critical Care Consultation    Date of consult: 2/10/2021    Referring Physician  Tessa Soler M.D.    Reason for Consultation  Hypotension from urosepsis    History of Presenting Illness  85 y.o. female who presented 2/9/2021 with weakness after a fall at home.  Pt states that for the last 3 days she has not been feeling well and has been tired and weak.  Pt admits to falling at home and being very weak.  Pt's Medic-Alert went off and EMS brought pt to the hospital.    Code Status  Full Code    Review of Systems  Review of Systems   Constitutional: Positive for chills and malaise/fatigue.   Respiratory: Negative for shortness of breath.    Cardiovascular: Negative for chest pain.   Gastrointestinal: Negative for abdominal pain.   Musculoskeletal: Positive for falls.   Neurological: Positive for weakness.       Past Medical History   has a past medical history of CAD (coronary artery disease), CATARACT, CHF (congestive heart failure) ([facility]), Cystocele, Diabetes, DJD (degenerative joint disease), HTN (hypertension), benign, Hyperlipidemia, Hypertension, Medical home, Myocardial infarct ([facility]) (2000), PVD (peripheral vascular disease) ([facility]), Renal disorder, Unspecified urinary incontinence, and Urinary bladder disorder. She also has no past medical history of Breast cancer ([facility]).    Surgical History   has a past surgical history that includes abdominal hysterectomy total; multiple coronary artery bypass (2004); and cataract phaco with iol (3/12/2014).    Family History  family history includes Diabetes in her daughter; Heart Disease in her mother and son; Hypertension in her mother; No Known Problems in her maternal grandfather, maternal grandmother, and son; Seizures in her son.    Social History   reports that she has never smoked. She has never used smokeless tobacco. She reports that she does not drink alcohol or use drugs.    Medications  Home Medications     Reviewed by Molly Cheng on 02/09/21 at 1726  Med  List Status: Complete   Medication Last Dose Status   apixaban (ELIQUIS) 2.5mg Tab 2/9/2021 Active   atorvastatin (LIPITOR) 80 MG tablet 2/8/2021 Active   carvedilol (COREG) 6.25 MG Tab 2/9/2021 Active   lisinopril (PRINIVIL) 20 MG Tab 2/9/2021 Active   spironolactone (ALDACTONE) 25 MG Tab 2/9/2021 Active   torsemide (DEMADEX) 100 MG Tab 2/9/2021 Active              Current Facility-Administered Medications   Medication Dose Route Frequency Provider Last Rate Last Admin   • senna-docusate (PERICOLACE or SENOKOT S) 8.6-50 MG per tablet 2 Tab  2 Tab Oral BID Tessa Soler M.D.        And   • polyethylene glycol/lytes (MIRALAX) PACKET 1 Packet  1 Packet Oral QDAY PRN Tessa Soler M.D.        And   • magnesium hydroxide (MILK OF MAGNESIA) suspension 30 mL  30 mL Oral QDAY PRN Tessa Soler M.D.        And   • bisacodyl (DULCOLAX) suppository 10 mg  10 mg Rectal QDAY PRN Tessa Soler M.D.       • heparin injection 5,000 Units  5,000 Units Subcutaneous Q8HRS Tessa Soler M.D.   5,000 Units at 02/09/21 2309   • acetaminophen (Tylenol) tablet 650 mg  650 mg Oral Q6HRS PRN Tessa Soler M.D.       • lactated ringers infusion (BOLUS)  1,000 mL Intravenous Once PRN Tessa Soler M.D.       • lactated ringers infusion   Intravenous Continuous Tessa Soler M.D. 125 mL/hr at 02/09/21 2133 New Bag at 02/09/21 2133   • cefepime (MAXIPIME) 2 g in  mL IVPB  2 g Intravenous Q24HRS Tessa Soler M.D.   Stopped at 02/09/21 2201       Allergies  Allergies   Allergen Reactions   • Tetracaine Hcl [Tetcaine]    • Vancomycin    • Vigamox [Moxifloxacin]        Vital Signs last 24 hours  Temp:  [36.2 °C (97.1 °F)-36.4 °C (97.5 °F)] 36.2 °C (97.1 °F)  Pulse:  [51-60] 60  Resp:  [11-62] 11  BP: ()/(33-63) 142/62  SpO2:  [97 %-100 %] 99 %    Physical Exam  Physical Exam  Constitutional:       Appearance: She is ill-appearing.   HENT:      Head: Normocephalic and atraumatic.      Nose: Nose normal.      Mouth/Throat:      Mouth:  Mucous membranes are moist.      Pharynx: Oropharynx is clear.   Eyes:      Pupils: Pupils are equal, round, and reactive to light.   Neck:      Musculoskeletal: Normal range of motion.   Cardiovascular:      Rate and Rhythm: Normal rate and regular rhythm.      Pulses: Normal pulses.   Pulmonary:      Effort: Pulmonary effort is normal. No respiratory distress.      Breath sounds: Normal breath sounds.   Abdominal:      General: Abdomen is flat. There is no distension.      Palpations: Abdomen is soft.      Tenderness: There is no abdominal tenderness.   Musculoskeletal:         General: Swelling present.      Right lower leg: No edema.      Left lower leg: No edema.   Skin:     General: Skin is warm and dry.      Capillary Refill: Capillary refill takes 2 to 3 seconds.   Neurological:      Mental Status: She is alert and oriented to person, place, and time.         Fluids    Intake/Output Summary (Last 24 hours) at 2/10/2021 0016  Last data filed at 2021 1830  Gross per 24 hour   Intake 150 ml   Output --   Net 150 ml       Laboratory  Recent Results (from the past 48 hour(s))   EKG    Collection Time: 21  3:11 PM   Result Value Ref Range    Report       [facility] Emergency Dept.    Test Date:  2021  Pt Name:    MORRO PINZON            Department: ER  MRN:        [id number]                      Room:       [facility]  Gender:     Female                       Technician: [id number]  :        [id number]                   Requested By:VIPIN COLUNGA  Order #:    [id number]                    Reading MD: VIPIN COLUNGA MD    Measurements  Intervals                                Axis  Rate:       52                           P:          72  OK:         252                          QRS:        67  QRSD:       113                          T:          134  QT:         426  QTc:        396    Interpretive Statements  Sinus bradycardia  Prolonged OK interval  LVH with secondary  repolarization abnormality  Compared to ECG 09/11/2020 12:10:27  Atrial premature complex(es) no longer present  Intraventricular conduction delay no longer present  Myocardial infarct finding no longer present  Electronically Signed On 2-9- 2021 16:53:45 PST by VIPIN COLUNGA MD     LACTIC ACID    Collection Time: 02/09/21  3:20 PM   Result Value Ref Range    Lactic Acid 2.0 0.5 - 2.0 mmol/L   CBC WITH DIFFERENTIAL    Collection Time: 02/09/21  3:20 PM   Result Value Ref Range    WBC 10.2 4.8 - 10.8 K/uL    RBC 4.80 4.20 - 5.40 M/uL    Hemoglobin 14.9 12.0 - 16.0 g/dL    Hematocrit 44.9 37.0 - 47.0 %    MCV 93.5 81.4 - 97.8 fL    MCH 31.0 27.0 - 33.0 pg    MCHC 33.2 (L) 33.6 - 35.0 g/dL    RDW 46.8 35.9 - 50.0 fL    Platelet Count 361 164 - 446 K/uL    MPV 10.4 9.0 - 12.9 fL    Neutrophils-Polys 80.30 (H) 44.00 - 72.00 %    Lymphocytes 11.60 (L) 22.00 - 41.00 %    Monocytes 6.70 0.00 - 13.40 %    Eosinophils 0.60 0.00 - 6.90 %    Basophils 0.30 0.00 - 1.80 %    Immature Granulocytes 0.50 0.00 - 0.90 %    Nucleated RBC 0.00 /100 WBC    Neutrophils (Absolute) 8.15 (H) 2.00 - 7.15 K/uL    Lymphs (Absolute) 1.18 1.00 - 4.80 K/uL    Monos (Absolute) 0.68 0.00 - 0.85 K/uL    Eos (Absolute) 0.06 0.00 - 0.51 K/uL    Baso (Absolute) 0.03 0.00 - 0.12 K/uL    Immature Granulocytes (abs) 0.05 0.00 - 0.11 K/uL    NRBC (Absolute) 0.00 K/uL   COMP METABOLIC PANEL    Collection Time: 02/09/21  3:20 PM   Result Value Ref Range    Sodium 126 (L) 135 - 145 mmol/L    Potassium 6.6 (HH) 3.6 - 5.5 mmol/L    Chloride 88 (L) 96 - 112 mmol/L    Co2 22 20 - 33 mmol/L    Anion Gap 16.0 7.0 - 16.0    Glucose 222 (H) 65 - 99 mg/dL    Bun 147 (HH) 8 - 22 mg/dL    Creatinine 3.67 (H) 0.50 - 1.40 mg/dL    Calcium 11.0 (H) 8.5 - 10.5 mg/dL    AST(SGOT) 22 12 - 45 U/L    ALT(SGPT) 26 2 - 50 U/L    Alkaline Phosphatase 117 (H) 30 - 99 U/L    Total Bilirubin 0.6 0.1 - 1.5 mg/dL    Albumin 4.1 3.2 - 4.9 g/dL    Total Protein 8.1 6.0 - 8.2 g/dL     Globulin 4.0 (H) 1.9 - 3.5 g/dL    A-G Ratio 1.0 g/dL   TROPONIN    Collection Time: 02/09/21  3:20 PM   Result Value Ref Range    Troponin T 81 (H) 6 - 19 ng/L   ESTIMATED GFR    Collection Time: 02/09/21  3:20 PM   Result Value Ref Range    GFR If  14 (A) >60 mL/min/1.73 m 2    GFR If Non  12 (A) >60 mL/min/1.73 m 2   URINALYSIS    Collection Time: 02/09/21  3:54 PM    Specimen: Urine   Result Value Ref Range    Color Yellow     Character Cloudy (A)     Specific Gravity 1.011 <1.035    Ph 5.5 5.0 - 8.0    Glucose Negative Negative mg/dL    Ketones Negative Negative mg/dL    Protein 100 (A) Negative mg/dL    Bilirubin Negative Negative    Urobilinogen, Urine 0.2 Negative    Nitrite Negative Negative    Leukocyte Esterase Large (A) Negative    Occult Blood Negative Negative    Micro Urine Req Microscopic    URINE MICROSCOPIC (W/UA)    Collection Time: 02/09/21  3:54 PM   Result Value Ref Range    WBC 20-50 (A) /hpf    RBC 0-2 /hpf    Bacteria Moderate (A) None /hpf    Epithelial Cells Negative /hpf    Hyaline Cast 0-2 /lpf   ACCU-CHEK GLUCOSE    Collection Time: 02/09/21  5:45 PM   Result Value Ref Range    Glucose - Accu-Ck 262 (H) 65 - 99 mg/dL   SARS-CoV-2 PCR (24 hour In-House): Collect NP swab in VTM    Collection Time: 02/09/21  5:46 PM    Specimen: Respirate   Result Value Ref Range    SARS-CoV-2 Source NP Swab    Lactic Acid Every four hours after STAT order    Collection Time: 02/09/21 10:50 PM   Result Value Ref Range    Lactic Acid 1.6 0.5 - 2.0 mmol/L   Prothrombin time (INR)    Collection Time: 02/09/21 10:50 PM   Result Value Ref Range    PT 16.4 (H) 12.0 - 14.6 sec    INR 1.28 (H) 0.87 - 1.13   Basic Metabolic Panel    Collection Time: 02/09/21 10:50 PM   Result Value Ref Range    Sodium 131 (L) 135 - 145 mmol/L    Potassium 5.4 3.6 - 5.5 mmol/L    Chloride 99 96 - 112 mmol/L    Co2 18 (L) 20 - 33 mmol/L    Glucose 119 (H) 65 - 99 mg/dL    Bun 136 (HH) 8 - 22 mg/dL     Creatinine 2.84 (H) 0.50 - 1.40 mg/dL    Calcium 9.2 8.5 - 10.5 mg/dL    Anion Gap 14.0 7.0 - 16.0   MAGNESIUM    Collection Time: 02/09/21 10:50 PM   Result Value Ref Range    Magnesium 1.7 1.5 - 2.5 mg/dL   ESTIMATED GFR    Collection Time: 02/09/21 10:50 PM   Result Value Ref Range    GFR If  19 (A) >60 mL/min/1.73 m 2    GFR If Non  16 (A) >60 mL/min/1.73 m 2       Imaging  DX-CHEST-LIMITED (1 VIEW)   Final Result         1.  No acute cardiopulmonary disease.   2.  Cardiomegaly   3.  Atherosclerosis      DX-CHEST-PORTABLE (1 VIEW)   Final Result      No acute cardiopulmonary abnormality identified.      EC-ECHOCARDIOGRAM COMPLETE W/O CONT    (Results Pending)       Assessment/Plan  Sepsis associated hypotension (HCC)  Assessment & Plan  This is Sepsis Present on admission  SIRS criteria identified on my evaluation include: Hypothermia, with temperature less than 96.8 deg F  Source is Urosepsis  Sepsis protocol initiated  Fluid resuscitation ordered per protocol  IV antibiotics as appropriate for source of sepsis  While organ dysfunction may be noted elsewhere in this problem list or in the chart, degree of organ dysfunction does not meet CMS criteria for severe sepsis    Pt's urine had bacteria and pt started on abx tx.  Pt had poor access and required placement of a central line for medications and fluid administration.          Discussed patient condition and risk of morbidity and/or mortality with Hospitalist and RN.    The patient remains critically ill.  Critical care time = 45 minutes in directly providing and coordinating critical care and extensive data review.  No time overlap and excludes procedures.

## 2021-02-10 NOTE — ASSESSMENT & PLAN NOTE
Creatinine 3.67 with potassium 6.6 and peaked T waves on EKG on admission   Stopped patient's Torsemide 100 mg daily and spironolactone 25 mg daily.  Improved after IV fluids  Restarted lisinopril

## 2021-02-10 NOTE — PROCEDURES
Central Line Insertion    Date/Time: 2/10/2021 12:09 AM  Performed by: Monster Vidales D.O.  Authorized by: Monster Vidales D.O.     Consent:     Consent obtained:  Verbal and written    Consent given by:  Patient    Risks discussed:  Bleeding, infection and pneumothorax    Alternatives discussed:  No treatment  Pre-procedure details:     Hand hygiene: Hand hygiene performed prior to insertion      Sterile barrier technique: All elements of maximal sterile technique followed      Skin preparation:  2% chlorhexidine    Skin preparation agent: Skin preparation agent completely dried prior to procedure    Sedation:     Sedation type:  None  Anesthesia:     Anesthesia method:  Local infiltration    Local anesthetic:  Lidocaine 1% w/o epi  Procedure details:     Location:  L internal jugular    Patient position:  Trendelenburg    Procedural supplies:  Triple lumen    Catheter size:  7 Fr    Landmarks identified: yes      Ultrasound guidance: yes      Number of attempts:  3  Post-procedure details:     Post-procedure:  Dressing applied and line sutured    Assessment:  Blood return through all ports and free fluid flow    Patient tolerance of procedure:  Tolerated well, no immediate complications  Comments:      Pt was a very difficult line placement due to dehydration and pt's small stature.  U/S guidance was used to identify the L IJ and the needle was inserted into the L IJ which was flat even in steep Trendelenburg position.  Wire was advanced into L IJ through needle.  Catheter was inserted.  CXR shows central line to be in L mammary vein and not SVC.  The line is a midline and not a central line.  The line may be used for venous access but only as a midline.

## 2021-02-10 NOTE — ED NOTES
Med rec updated and complete. Allergies reviewed. Pt denies antibiotic use in last 14 days.    Home pharmacy [facility]

## 2021-02-10 NOTE — ASSESSMENT & PLAN NOTE
History of congestive heart failure on diuretics.  Restarted ACEi  Now EF 40-45%, no longer needs aldactone, will not restart  Appears euvoluemic, held toresemide

## 2021-02-10 NOTE — CARE PLAN
Problem: Fluid Volume:  Goal: Will maintain balanced intake and output  Outcome: NOT MET     Problem: Safety  Goal: Will remain free from injury  Outcome: PROGRESSING AS EXPECTED  Goal: Will remain free from falls  Outcome: PROGRESSING AS EXPECTED     Problem: Infection  Goal: Will remain free from infection  Outcome: PROGRESSING AS EXPECTED     Problem: Skin Integrity  Goal: Risk for impaired skin integrity will decrease  Outcome: PROGRESSING AS EXPECTED

## 2021-02-10 NOTE — ASSESSMENT & PLAN NOTE
Monitor accuchecks and cover with sliding scale insulin  Hemoglobin A1c 6.5% in past 4 months.  Diabetic diet.

## 2021-02-11 ENCOUNTER — APPOINTMENT (OUTPATIENT)
Dept: CARDIOLOGY | Facility: MEDICAL CENTER | Age: 86
DRG: 853 | End: 2021-02-11
Attending: HOSPITALIST
Payer: MEDICARE

## 2021-02-11 PROBLEM — A41.9 SEVERE SEPSIS WITH SEPTIC SHOCK (HCC): Status: RESOLVED | Noted: 2021-02-09 | Resolved: 2021-02-11

## 2021-02-11 PROBLEM — R65.21 SEVERE SEPSIS WITH SEPTIC SHOCK (HCC): Status: RESOLVED | Noted: 2021-02-09 | Resolved: 2021-02-11

## 2021-02-11 LAB
ANION GAP SERPL CALC-SCNC: 9 MMOL/L (ref 7–16)
BACTERIA UR CULT: ABNORMAL
BACTERIA UR CULT: ABNORMAL
BASOPHILS # BLD AUTO: 0.6 % (ref 0–1.8)
BASOPHILS # BLD: 0.04 K/UL (ref 0–0.12)
BUN SERPL-MCNC: 94 MG/DL (ref 8–22)
CALCIUM SERPL-MCNC: 8.2 MG/DL (ref 8.5–10.5)
CHLORIDE SERPL-SCNC: 109 MMOL/L (ref 96–112)
CO2 SERPL-SCNC: 19 MMOL/L (ref 20–33)
CREAT SERPL-MCNC: 1.57 MG/DL (ref 0.5–1.4)
EOSINOPHIL # BLD AUTO: 0.15 K/UL (ref 0–0.51)
EOSINOPHIL NFR BLD: 2.1 % (ref 0–6.9)
ERYTHROCYTE [DISTWIDTH] IN BLOOD BY AUTOMATED COUNT: 48.4 FL (ref 35.9–50)
GLUCOSE SERPL-MCNC: 102 MG/DL (ref 65–99)
HCT VFR BLD AUTO: 31.3 % (ref 37–47)
HGB BLD-MCNC: 10.5 G/DL (ref 12–16)
IMM GRANULOCYTES # BLD AUTO: 0.04 K/UL (ref 0–0.11)
IMM GRANULOCYTES NFR BLD AUTO: 0.6 % (ref 0–0.9)
LV EJECT FRACT  99904: 40
LV EJECT FRACT MOD 2C 99903: 29.87
LV EJECT FRACT MOD 4C 99902: 48.89
LV EJECT FRACT MOD BP 99901: 40.79
LYMPHOCYTES # BLD AUTO: 1.19 K/UL (ref 1–4.8)
LYMPHOCYTES NFR BLD: 16.6 % (ref 22–41)
MAGNESIUM SERPL-MCNC: 1.4 MG/DL (ref 1.5–2.5)
MCH RBC QN AUTO: 32.1 PG (ref 27–33)
MCHC RBC AUTO-ENTMCNC: 33.5 G/DL (ref 33.6–35)
MCV RBC AUTO: 95.7 FL (ref 81.4–97.8)
MONOCYTES # BLD AUTO: 0.72 K/UL (ref 0–0.85)
MONOCYTES NFR BLD AUTO: 10 % (ref 0–13.4)
NEUTROPHILS # BLD AUTO: 5.04 K/UL (ref 2–7.15)
NEUTROPHILS NFR BLD: 70.1 % (ref 44–72)
NRBC # BLD AUTO: 0 K/UL
NRBC BLD-RTO: 0 /100 WBC
PHOSPHATE SERPL-MCNC: 2.9 MG/DL (ref 2.5–4.5)
PLATELET # BLD AUTO: 198 K/UL (ref 164–446)
PMV BLD AUTO: 10.3 FL (ref 9–12.9)
POTASSIUM SERPL-SCNC: 4.1 MMOL/L (ref 3.6–5.5)
RBC # BLD AUTO: 3.27 M/UL (ref 4.2–5.4)
SIGNIFICANT IND 70042: ABNORMAL
SITE SITE: ABNORMAL
SODIUM SERPL-SCNC: 137 MMOL/L (ref 135–145)
SOURCE SOURCE: ABNORMAL
WBC # BLD AUTO: 7.2 K/UL (ref 4.8–10.8)

## 2021-02-11 PROCEDURE — 700117 HCHG RX CONTRAST REV CODE 255: Performed by: HOSPITALIST

## 2021-02-11 PROCEDURE — 700102 HCHG RX REV CODE 250 W/ 637 OVERRIDE(OP): Performed by: HOSPITALIST

## 2021-02-11 PROCEDURE — 85025 COMPLETE CBC W/AUTO DIFF WBC: CPT

## 2021-02-11 PROCEDURE — A9270 NON-COVERED ITEM OR SERVICE: HCPCS | Performed by: HOSPITALIST

## 2021-02-11 PROCEDURE — 99233 SBSQ HOSP IP/OBS HIGH 50: CPT | Performed by: HOSPITALIST

## 2021-02-11 PROCEDURE — 700111 HCHG RX REV CODE 636 W/ 250 OVERRIDE (IP): Performed by: INTERNAL MEDICINE

## 2021-02-11 PROCEDURE — 770020 HCHG ROOM/CARE - TELE (206)

## 2021-02-11 PROCEDURE — A9270 NON-COVERED ITEM OR SERVICE: HCPCS | Performed by: INTERNAL MEDICINE

## 2021-02-11 PROCEDURE — 84100 ASSAY OF PHOSPHORUS: CPT

## 2021-02-11 PROCEDURE — 700111 HCHG RX REV CODE 636 W/ 250 OVERRIDE (IP): Performed by: HOSPITALIST

## 2021-02-11 PROCEDURE — 700105 HCHG RX REV CODE 258: Performed by: INTERNAL MEDICINE

## 2021-02-11 PROCEDURE — 93306 TTE W/DOPPLER COMPLETE: CPT | Mod: 26 | Performed by: INTERNAL MEDICINE

## 2021-02-11 PROCEDURE — 80048 BASIC METABOLIC PNL TOTAL CA: CPT

## 2021-02-11 PROCEDURE — 93306 TTE W/DOPPLER COMPLETE: CPT

## 2021-02-11 PROCEDURE — 700102 HCHG RX REV CODE 250 W/ 637 OVERRIDE(OP): Performed by: INTERNAL MEDICINE

## 2021-02-11 PROCEDURE — 83735 ASSAY OF MAGNESIUM: CPT

## 2021-02-11 RX ORDER — HYDRALAZINE HYDROCHLORIDE 20 MG/ML
10 INJECTION INTRAMUSCULAR; INTRAVENOUS EVERY 4 HOURS PRN
Status: DISCONTINUED | OUTPATIENT
Start: 2021-02-11 | End: 2021-02-18 | Stop reason: HOSPADM

## 2021-02-11 RX ORDER — MAGNESIUM SULFATE HEPTAHYDRATE 40 MG/ML
4 INJECTION, SOLUTION INTRAVENOUS ONCE
Status: COMPLETED | OUTPATIENT
Start: 2021-02-11 | End: 2021-02-11

## 2021-02-11 RX ADMIN — ATORVASTATIN CALCIUM 80 MG: 80 TABLET, FILM COATED ORAL at 17:50

## 2021-02-11 RX ADMIN — HUMAN ALBUMIN MICROSPHERES AND PERFLUTREN 3 ML: 10; .22 INJECTION, SOLUTION INTRAVENOUS at 11:17

## 2021-02-11 RX ADMIN — MAGNESIUM SULFATE IN WATER 4 G: 40 INJECTION, SOLUTION INTRAVENOUS at 08:52

## 2021-02-11 RX ADMIN — HEPARIN SODIUM 5000 UNITS: 5000 INJECTION, SOLUTION INTRAVENOUS; SUBCUTANEOUS at 22:29

## 2021-02-11 RX ADMIN — SODIUM CHLORIDE: 9 INJECTION, SOLUTION INTRAVENOUS at 17:51

## 2021-02-11 RX ADMIN — DOCUSATE SODIUM 50 MG AND SENNOSIDES 8.6 MG 2 TABLET: 8.6; 5 TABLET, FILM COATED ORAL at 17:50

## 2021-02-11 RX ADMIN — HEPARIN SODIUM 5000 UNITS: 5000 INJECTION, SOLUTION INTRAVENOUS; SUBCUTANEOUS at 14:00

## 2021-02-11 RX ADMIN — CEFTRIAXONE SODIUM 1 G: 1 INJECTION, POWDER, FOR SOLUTION INTRAMUSCULAR; INTRAVENOUS at 17:50

## 2021-02-11 RX ADMIN — SODIUM CHLORIDE: 9 INJECTION, SOLUTION INTRAVENOUS at 02:42

## 2021-02-11 RX ADMIN — HEPARIN SODIUM 5000 UNITS: 5000 INJECTION, SOLUTION INTRAVENOUS; SUBCUTANEOUS at 05:08

## 2021-02-11 ASSESSMENT — FIBROSIS 4 INDEX: FIB4 SCORE: 1.33

## 2021-02-11 NOTE — PROGRESS NOTES
Monitor Summary  SB-SR 54 -69  occ dropped beat registering rate in 30's   freq pvc/pacs    0.4/0.10/0.4

## 2021-02-11 NOTE — DIETARY
"Nutrition services: Day 2 of admit.  Ana Torres is a 85 y.o. female with admitting DX of Acute hyperkalemia, Sepsis (HCC)    RD alerted to pt with Malnutrition Screening Score of 2 for unsure weight loss PTA.    Reviewed chart prior to visit with pt:  · Pt reported the following to MD: Weakness, decreased appetite, having to force herself to eat, 10 lb weight loss in 1 week.  · Pertinent Hx includes DM and HF.   · Last admit was 9/2020. Current weight compared to last indicates 6% weight loss in 5 months, which is not considered significant or severe in that timeframe.  · Pt having regular BMs.  · DTI to sacrum per Wound Team consult.    Assessment:  Height: 149.9 cm (4' 11\")  Weight: 47.4 kg (104 lb 8 oz)  Body mass index is 21.11 kg/m²., BMI classification: Normal (low-end)    Diet/Intake: Renal diet in place. 1 meal recorded thus far in ADLs: 25-50%.    Evaluation:   · Spoke with pt, who says she is tolerating meals provided since admit, though intake is <50%. Obtained a few specific food preferences. Will decrease to small portions @ meals.   · Reviewed home PO intake: B) Cereal and coffee; L) Soup and coffee; D) Meal prepared by son and/or frozen meal.  · Noted missing teeth. Pt has a bridge and denies difficulty chewing.  · Pt says she weighs on a scale @ home and UBW is ~100 lb (which is less than current measured weight). Despite prior report of 10 lb weight loss in 1 week, pt says she does not believe she has lost that much. Strongly discouraged pt from losing more weight, and she was agreeable.  · Offered snacks in between meals for additional daily intake, and pt was receptive to things like fruit, pudding, and ice cream.  · Finally, discussed the importance of hydration.      Malnutrition Risk: Inadequate energy intake, but no other malnutrition criteria met @ this time.     Recommendations/Plan:  1. Encourage intake of meals and snacks.  2. Document all PO intake in ADLs to provide " interdisciplinary communication across all shifts.   3. Monitor weight.  4. Encourage hydration.   5. Nutrition Representative will continue to see pt for ongoing preferences.     RD following.

## 2021-02-11 NOTE — PROGRESS NOTES
Pulmonary and Critical Care Medicine Progress Note    Discussed with nurse.  Chart reviewed.  She has no more critical care needs.    OK to transfer out of ICU.  [facility] will sign off.  Please call if you have any questions.    Cristian Kent MD  Pulmonary and Critical Care Medicine

## 2021-02-11 NOTE — CARE PLAN
Problem: Nutritional:  Goal: Achieve adequate nutritional intake  Description: Patient will consume 75% of small meals and snacks to meet estimated needs.   Outcome: PROGRESSING SLOWER THAN EXPECTED

## 2021-02-11 NOTE — ASSESSMENT & PLAN NOTE
EF 30%  Currently compensated  Avoid volume overload  Hold carvedilol, lisinopril, Aldactone and torsemide for now due to severe sepsis with septic shock

## 2021-02-11 NOTE — WOUND TEAM
[facility]  Inpatient Services  Initial Wound and Skin Care Evaluation    Admission Date: 2/9/2021     Last order of IP CONSULT TO WOUND CARE was found on 2/10/2021 from Hospital Encounter on 2/9/2021     HPI, PMH, SH: Reviewed    Past Surgical History:   Procedure Laterality Date   • CATARACT PHACO WITH IOL  3/12/2014    Performed by Murtaza Casey M.D. at [facility]   • MULTIPLE CORONARY ARTERY BYPASS  2004   • ABDOMINAL HYSTERECTOMY TOTAL       Social History     Tobacco Use   • Smoking status: Never Smoker   • Smokeless tobacco: Never Used   Substance Use Topics   • Alcohol use: No     Chief Complaint   Patient presents with   • Weakness     Pt is complaining of increased weakness over the last three days. Pt states she has lost 10lbs over the last week. Pt usually ambulates on her own with a steady gait. Pt lives by herself and since becoming weak, patient has been having episodes of incontinence.    • GLF     Pt fell onto her knees today and used her med alert button. EMS found pt on her knees. Pt denies hitting her head. Pt taking eliquis.      Diagnosis: Acute hyperkalemia [E87.5]  Sepsis (HCC) [A41.9]    Unit where seen by Wound Team: [id number]/00     WOUND CONSULT/FOLLOW UP RELATED TO:  sacrum     WOUND HISTORY:  Patient is incontinent of urine and stool. Per patient son, patient hasn't been very active lately and has been laying on backside often. Patient with POA DTI.     WOUND ASSESSMENT/LDA  Moisture Associated Skin Damage 02/10/21 Midline Sacrum (Active)   Drainage Amount None 02/10/21 1200   IAD Cleansing Soap and Water 02/10/21 1000   Periwound Protectant Barrier Paste 02/10/21 1000   Number of days: 0       Wound 02/10/21 Pressure Injury Sacrum POA DTI with surrounding MASD (Active)   Wound Image   02/10/21 1000   Site Assessment Purple;Red 02/10/21 1000   Periwound Assessment Red;Satellite lesions 02/10/21 1000   Margins Attached edges 02/10/21 1000   Closure Open  to air 02/10/21 1000   Drainage Amount None 02/10/21 1000   Treatments Cleansed;Site care 02/10/21 1000   Wound Cleansing Approved Wound Cleanser 02/10/21 1000   Periwound Protectant Barrier Paste 02/10/21 1000   Dressing Cleansing/Solutions Not Applicable 02/10/21 1000   Dressing Options Open to Air 02/10/21 1000   Dressing Changed New 02/10/21 1000   Dressing Status Intact;Dry;Clean 02/10/21 1000   Dressing Change/Treatment Frequency Every Shift, and As Needed 02/10/21 1000   NEXT Weekly Photo (Inpatient Only) 02/17/21 02/10/21 1000   Pressure Injury Stage DTPI 02/10/21 1000   Wound Length (cm) 1.3 cm 02/10/21 1000   Wound Width (cm) 0.3 cm 02/10/21 1000   Wound Surface Area (cm^2) 0.39 cm^2 02/10/21 1000   Number of days: 0        Vascular:    NEERAJ:   No results found.    Lab Values:    Lab Results   Component Value Date/Time    WBC 10.1 02/10/2021 02:35 AM    RBC 3.93 (L) 02/10/2021 02:35 AM    HEMOGLOBIN 12.7 02/10/2021 02:35 AM    HEMATOCRIT 37.0 02/10/2021 02:35 AM    HBA1C 6.5 (H) 10/02/2020 12:45 PM        Culture Results show:  No results found for this or any previous visit (from the past 720 hour(s)).    Pain Level/Medicated:  No pain.        INTERVENTIONS BY WOUND TEAM:  Chart and images reviewed. Discussed with bedside RN. This RN in to assess patient. Performed standard wound care which includes appropriate positioning, dressing removal and non-selective debridement. Pictures and measurements obtained weekly if/when required.  Preparation for Dressing removal: Dressing soaked with n/a  Cleansed with:  NS and gauze.  Sharp debridement: N/A  Jammie wound: Cleansed with NS, Prepped with n/a  Primary Dressing: barrier paste  Secondary (Outer) Dressing: n/a    Interdisciplinary consultation: Patient, Bedside RN ([place])    EVALUATION / RATIONALE FOR TREATMENT:  Most Recent Date:  2/10/21: Patient sacrum with POA DTI, small non-blanching area. Also noted a divot at patient coccyx, unknown etiology. Patient  buttocks with surrounding MASD due to incontinence present. Ordered for barrier paste to be applied. No need for wound team follow up, please call or reconsult.      Goals: Steady decrease in wound area and depth weekly.    WOUND TEAM PLAN OF CARE ([X] for frequency of wound follow up,):   Nursing to follow orders written for wound care. Contact wound team if area fails to progress, deteriorates or with any questions/concerns  Dressing changes by wound team:                   Follow up 3 times weekly:                NPWT change 3 times weekly:     Follow up 1-2 times weekly:      Follow up Bi-Monthly:                   Follow up as needed:   X  Other (explain):     NURSING PLAN OF CARE ORDERS (X):  Dressing changes: See Dressing Care orders:   Skin care: See Skin Care orders: X  RN Prevention Protocol:   Rectal tube care: See Rectal Tube Care orders:   Other orders:    RSKIN:   CURRENTLY IN PLACE (X), APPLIED THIS VISIT (A), ORDERED (O):   Q shift Celestino:  X  Q shift pressure point assessments:  X    Surface/Positioning   Pressure redistribution mattress            Low Airloss       X ICU   Bariatric foam      Bariatric CHRISTAL     Waffle cushion        Waffle Overlay          Reposition q 2 hours    X  TAPs Turning system     Z Arnol Pillow     Offloading/Redistribution   Sacral Mepilex (Silicone dressing)   Removed due to moisture  Heel Mepilex (Silicone dressing)         Heel float boots (Prevalon boot)             Float Heels off Bed with Pillows           Respiratory n/a  Silicone O2 tubing         Gray Foam Ear protectors     Cannula fixation Device (Tender )          High flow offloading Clip    Elastic head band offloading device      Anchorfast                                                         Trach with Optifoam split foam             Containment/Moisture Prevention     Rectal tube or BMS    Purwick/Condom Cath  X      Pitts Catheter    Barrier wipes           Barrier paste   A    Antifungal tx         Interdry        Mobilization WENDY      Up to chair        Ambulate      PT/OT      Nutrition WENDY      Dietician        Diabetes Education      PO     TF     TPN     NPO   # days     Other        Anticipated discharge plans: N/A  LTACH:        SNF/Rehab:                  Home Health Care:           Outpatient Wound Center:            Self/Family Care:        Other:

## 2021-02-11 NOTE — PROGRESS NOTES
Hospital Medicine Daily Progress Note    Date of Service  2/11/2021    Chief Complaint  Weakness and GLF    Hospital Course  85 y.o. female with CHF, CAD w/ CABG, HTN, PVD, DMII, afib admitted 2/9/2021 with acute renal failure and initial K:6.6 with Cr:3.67.  She required emergent hemodialysis. She was hypotensive in the ER and admitted to the ICU with possible Sepsis from a urinary source (ecoli).      Interval Problem Update  Lethargic and has bear hugger on for warming her.  Care discussed with Dr Kent and pt's ICU Rn.    Consultants/Specialty  Nephrology  Intensivist (signing off 2/11)    Code Status  Full Code    Disposition  Transfer to medical floor 2/11    Review of Systems  Review of Systems   Unable to perform ROS: Mental acuity        Physical Exam  Temp:  [36.5 °C (97.7 °F)-37 °C (98.6 °F)] 36.5 °C (97.7 °F)  Pulse:  [54-69] 58  Resp:  [13-21] 16  BP: ()/() 139/109  SpO2:  [90 %-98 %] 95 %    Physical Exam  Vitals reviewed.   Constitutional:       Appearance: She is not diaphoretic.      Comments: frail   HENT:      Head: Normocephalic and atraumatic.      Nose: Nose normal.      Mouth/Throat:      Mouth: Mucous membranes are moist.      Pharynx: No oropharyngeal exudate.   Eyes:      General: No scleral icterus.        Right eye: No discharge.         Left eye: No discharge.      Conjunctiva/sclera: Conjunctivae normal.   Cardiovascular:      Rate and Rhythm: Normal rate and regular rhythm.      Pulses:           Radial pulses are 2+ on the right side and 2+ on the left side.        Dorsalis pedis pulses are 2+ on the right side and 2+ on the left side.      Heart sounds: No murmur.   Pulmonary:      Effort: Pulmonary effort is normal. No respiratory distress.      Breath sounds: Normal breath sounds. No wheezing or rales.   Abdominal:      General: Bowel sounds are normal. There is no distension.      Palpations: Abdomen is soft.   Musculoskeletal:         General: No swelling or  tenderness.      Cervical back: No muscular tenderness.      Right lower leg: No edema.      Left lower leg: No edema.   Lymphadenopathy:      Cervical: No cervical adenopathy.   Skin:     Coloration: Skin is pale. Skin is not jaundiced.   Neurological:      General: No focal deficit present.      Mental Status: She is oriented to person, place, and time. Mental status is at baseline.      Cranial Nerves: No cranial nerve deficit.   Psychiatric:         Mood and Affect: Mood normal.         Behavior: Behavior normal.         Fluids    Intake/Output Summary (Last 24 hours) at 2/11/2021 1921  Last data filed at 2/11/2021 1200  Gross per 24 hour   Intake 679.2 ml   Output 520 ml   Net 159.2 ml       Laboratory  Recent Labs     02/09/21  1520 02/10/21  0235 02/11/21  0445   WBC 10.2 10.1 7.2   RBC 4.80 3.93* 3.27*   HEMOGLOBIN 14.9 12.7 10.5*   HEMATOCRIT 44.9 37.0 31.3*   MCV 93.5 94.1 95.7   MCH 31.0 32.3 32.1   MCHC 33.2* 34.3 33.5*   RDW 46.8 47.1 48.4   PLATELETCT 361 270 198   MPV 10.4 10.3 10.3     Recent Labs     02/10/21  0235 02/10/21  0545 02/10/21  1710 02/11/21  0445   SODIUM 129* 131*  --  137   POTASSIUM 5.4 5.6* 4.7 4.1   CHLORIDE 98 100  --  109   CO2 19* 20  --  19*   GLUCOSE 132* 156*  --  102*   * 127*  --  94*   CREATININE 2.47* 2.45*  --  1.57*   CALCIUM 9.3 9.3  --  8.2*     Recent Labs     02/09/21  2250   INR 1.28*               Imaging  EC-ECHOCARDIOGRAM COMPLETE W/ CONT   Final Result      DX-CHEST-LIMITED (1 VIEW)   Final Result         1.  No acute cardiopulmonary disease.   2.  Cardiomegaly   3.  Atherosclerosis      DX-CHEST-PORTABLE (1 VIEW)   Final Result      No acute cardiopulmonary abnormality identified.           Assessment/Plan  * Acute renal failure superimposed on stage 3 chronic kidney disease (HCC)- (present on admission)  Assessment & Plan  Creatinine 3.67 with potassium 6.6 and peaked T waves on EKG.  Stop patient's  Torsemide 100 mg daily and spironolactone 25 mg  daily.  IV fluids  Required initial hemodialysis per Dr Kent.  2/11 BUN:94, Cr:1.57    Hyperkalemia  Assessment & Plan  Elevated 6.6 on admission.  With peaked T waves.  Required hemodialysis  2/11 K:4.1    Chronic systolic heart failure (HCC)- (present on admission)  Assessment & Plan  History of congestive heart failure on diuretics.  Monitor while off diuretics and given sepsis loading of IV fluids.    Pulmonary hypertension (HCC)- (present on admission)  Assessment & Plan  History of pulmonary hypertension.  Supplemental oxygen    Chronic atrial fibrillation (HCC)- (present on admission)  Assessment & Plan  Eliquis and coreg on hold since admit.  Monitor vitals and on telemetry    DM type 2 (diabetes mellitus, type 2) ([facility])- (present on admission)  Assessment & Plan  Monitor accuchecks and cover with sliding scale insulin  Hemoglobin A1c 6.5% in past 4 months.  Diabetic diet.    Essential hypertension- (present on admission)  Assessment & Plan  Currently with hypotension.  Sepsis related.  Holding all blood pressure medications.  Adjust/restart home BP meds as vitals and renal function allows.    Acute cystitis without hematuria- (present on admission)  Assessment & Plan  Antibiotics  Ecoli  2/11 WBC:7.2    Hypercalcemia  Assessment & Plan  Resolved  Monitor labs      CAD (coronary artery disease)- (present on admission)  Assessment & Plan  Patient on beta-blocker and ACE inhibitor Lipitor.  Holding current blood pressure medications due to sepsis associated hypotension.  Monitor vitals.       VTE prophylaxis: Heparin

## 2021-02-12 LAB
ANION GAP SERPL CALC-SCNC: 10 MMOL/L (ref 7–16)
BUN SERPL-MCNC: 51 MG/DL (ref 8–22)
CALCIUM SERPL-MCNC: 8.9 MG/DL (ref 8.5–10.5)
CHLORIDE SERPL-SCNC: 107 MMOL/L (ref 96–112)
CO2 SERPL-SCNC: 18 MMOL/L (ref 20–33)
CREAT SERPL-MCNC: 0.9 MG/DL (ref 0.5–1.4)
GLUCOSE SERPL-MCNC: 277 MG/DL (ref 65–99)
POTASSIUM SERPL-SCNC: 4.1 MMOL/L (ref 3.6–5.5)
SODIUM SERPL-SCNC: 135 MMOL/L (ref 135–145)

## 2021-02-12 PROCEDURE — 700111 HCHG RX REV CODE 636 W/ 250 OVERRIDE (IP): Performed by: INTERNAL MEDICINE

## 2021-02-12 PROCEDURE — 80048 BASIC METABOLIC PNL TOTAL CA: CPT

## 2021-02-12 PROCEDURE — 700102 HCHG RX REV CODE 250 W/ 637 OVERRIDE(OP): Performed by: HOSPITALIST

## 2021-02-12 PROCEDURE — 700111 HCHG RX REV CODE 636 W/ 250 OVERRIDE (IP): Performed by: HOSPITALIST

## 2021-02-12 PROCEDURE — 36415 COLL VENOUS BLD VENIPUNCTURE: CPT

## 2021-02-12 PROCEDURE — A9270 NON-COVERED ITEM OR SERVICE: HCPCS | Performed by: HOSPITALIST

## 2021-02-12 PROCEDURE — 700105 HCHG RX REV CODE 258: Performed by: INTERNAL MEDICINE

## 2021-02-12 PROCEDURE — 99232 SBSQ HOSP IP/OBS MODERATE 35: CPT | Performed by: HOSPITALIST

## 2021-02-12 PROCEDURE — 770020 HCHG ROOM/CARE - TELE (206)

## 2021-02-12 RX ORDER — CARVEDILOL 6.25 MG/1
6.25 TABLET ORAL 2 TIMES DAILY WITH MEALS
Status: DISCONTINUED | OUTPATIENT
Start: 2021-02-12 | End: 2021-02-15

## 2021-02-12 RX ORDER — ATORVASTATIN CALCIUM 80 MG/1
80 TABLET, FILM COATED ORAL EVERY EVENING
Status: DISCONTINUED | OUTPATIENT
Start: 2021-02-12 | End: 2021-02-18 | Stop reason: HOSPADM

## 2021-02-12 RX ORDER — LISINOPRIL 20 MG/1
20 TABLET ORAL
Status: DISCONTINUED | OUTPATIENT
Start: 2021-02-12 | End: 2021-02-18 | Stop reason: HOSPADM

## 2021-02-12 RX ADMIN — CARVEDILOL 6.25 MG: 6.25 TABLET, FILM COATED ORAL at 17:17

## 2021-02-12 RX ADMIN — CEFTRIAXONE SODIUM 1 G: 1 INJECTION, POWDER, FOR SOLUTION INTRAMUSCULAR; INTRAVENOUS at 17:17

## 2021-02-12 RX ADMIN — LISINOPRIL 20 MG: 20 TABLET ORAL at 09:10

## 2021-02-12 RX ADMIN — APIXABAN 2.5 MG: 2.5 TABLET, FILM COATED ORAL at 17:16

## 2021-02-12 RX ADMIN — HYDRALAZINE HYDROCHLORIDE 10 MG: 20 INJECTION INTRAMUSCULAR; INTRAVENOUS at 05:31

## 2021-02-12 RX ADMIN — CARVEDILOL 6.25 MG: 6.25 TABLET, FILM COATED ORAL at 09:09

## 2021-02-12 RX ADMIN — APIXABAN 2.5 MG: 2.5 TABLET, FILM COATED ORAL at 09:10

## 2021-02-12 RX ADMIN — HEPARIN SODIUM 5000 UNITS: 5000 INJECTION, SOLUTION INTRAVENOUS; SUBCUTANEOUS at 05:31

## 2021-02-12 RX ADMIN — DOCUSATE SODIUM 50 MG AND SENNOSIDES 8.6 MG 2 TABLET: 8.6; 5 TABLET, FILM COATED ORAL at 05:31

## 2021-02-12 RX ADMIN — ATORVASTATIN CALCIUM 80 MG: 80 TABLET, FILM COATED ORAL at 17:17

## 2021-02-12 ASSESSMENT — ENCOUNTER SYMPTOMS
SENSORY CHANGE: 0
CHILLS: 0
BACK PAIN: 0
DIZZINESS: 0
STRIDOR: 0
DEPRESSION: 0
NAUSEA: 0
SHORTNESS OF BREATH: 0
HEADACHES: 0
EYE DISCHARGE: 0
VOMITING: 0
COUGH: 0
NERVOUS/ANXIOUS: 0
FEVER: 0
ABDOMINAL PAIN: 0
PALPITATIONS: 0
SPEECH CHANGE: 0
DIARRHEA: 0

## 2021-02-12 ASSESSMENT — COGNITIVE AND FUNCTIONAL STATUS - GENERAL
MOVING TO AND FROM BED TO CHAIR: A LOT
MOBILITY SCORE: 13
PERSONAL GROOMING: A LITTLE
MOVING FROM LYING ON BACK TO SITTING ON SIDE OF FLAT BED: A LOT
DRESSING REGULAR UPPER BODY CLOTHING: A LITTLE
STANDING UP FROM CHAIR USING ARMS: A LOT
CLIMB 3 TO 5 STEPS WITH RAILING: A LOT
WALKING IN HOSPITAL ROOM: A LOT
SUGGESTED CMS G CODE MODIFIER MOBILITY: CL
TURNING FROM BACK TO SIDE WHILE IN FLAT BAD: A LITTLE
HELP NEEDED FOR BATHING: A LITTLE
TOILETING: A LITTLE

## 2021-02-12 ASSESSMENT — CHA2DS2 SCORE
AGE 65 TO 74: NO
CHF OR LEFT VENTRICULAR DYSFUNCTION: YES
DIABETES: YES
PRIOR STROKE OR TIA OR THROMBOEMBOLISM: NO
VASCULAR DISEASE: YES
SEX: FEMALE
HYPERTENSION: YES
AGE 75 OR GREATER: YES
CHA2DS2 VASC SCORE: 7

## 2021-02-12 ASSESSMENT — FIBROSIS 4 INDEX: FIB4 SCORE: 1.81

## 2021-02-12 NOTE — PROGRESS NOTES
I was called for elevating blood pressures.  Her BB, ACEI, spironolactone and Demadex have not been restartd because of sepsis with hypotension earlier.  She has improved, her Cr- down to 1.57  HR in the 60s.  Ordered PRN IV hydralazine  Defer starting oral antihypertensive to attending in the AM; can restart BB if HR better, ACEI/spironolaxctone/Demadex if Cr- consinues to improve. Otherwise consider amlodipine and/or PO hydralazine

## 2021-02-12 NOTE — PROGRESS NOTES
Patient arrived from [facility] to T710. Orientated to room. Vitals assessed. Night time medications given. Purewick placed. Tele monitoring initiated. Reported off to night shift RN at the bedside.

## 2021-02-12 NOTE — PROGRESS NOTES
Assumed care of PT A&O 4. Pt resting in bed with no signs of labored breathing. On RA. Tele monitor in place, cardiac rhythm being monitored. Call light within reach, bed in lowest position, upper bed rails up. Pt was updated on plan of care for the night.

## 2021-02-12 NOTE — PROGRESS NOTES
2 RN Skin Check    2 RN skin check complete with Tamy RN  Devices in place: SCDs and Nasal Cannula.  Skin assessed under devices: yes.  Confirmed pressure ulcers found on: None.  New potential pressure ulcers noted on Coccyx, Bilateral heels. Wound consult placed No.  The following interventions in place Pillows, Mepilex, Lotion, Barrier cream and Heel float boots.

## 2021-02-12 NOTE — CARE PLAN
Problem: Venous Thromboembolism (VTW)/Deep Vein Thrombosis (DVT) Prevention:  Goal: Patient will participate in Venous Thrombosis (VTE)/Deep Vein Thrombosis (DVT)Prevention Measures  Outcome: PROGRESSING AS EXPECTED     Problem: Knowledge Deficit  Goal: Knowledge of disease process/condition, treatment plan, diagnostic tests, and medications will improve  Outcome: PROGRESSING AS EXPECTED     Problem: Safety  Goal: Will remain free from falls  Outcome: PROGRESSING AS EXPECTED

## 2021-02-12 NOTE — PROGRESS NOTES
Hospital Medicine Daily Progress Note    Date of Service  2/12/2021    Chief Complaint  Weakness and GLF    Hospital Course  85 y.o. female with CHF, CAD w/ CABG, HTN, PVD, DMII, afib admitted 2/9/2021 with acute renal failure and initial K:6.6 with Cr:3.67.  She required emergent hemodialysis. She was hypotensive in the ER and admitted to the ICU with possible Sepsis from a urinary source (ecoli).      Interval Problem Update  Awake sitting upright in bed eating breakfast.  States she lives alone and has her son check in on her.  She uses a walker and cane at home.  Has a purewick in place.  Denies SOB  Required prn hydralazine overnight    Consultants/Specialty  Nephrology  Intensivist (signing off 2/11)    Code Status  Full Code    Disposition  Assess home safety with PT/OT for possible near future discharge to home vs need of SNF.    Review of Systems  Review of Systems   Constitutional: Negative for chills, fever and malaise/fatigue.   Eyes: Negative for discharge.   Respiratory: Negative for cough, shortness of breath and stridor.    Cardiovascular: Negative for chest pain, palpitations and leg swelling.   Gastrointestinal: Negative for abdominal pain, diarrhea, nausea and vomiting.   Genitourinary: Negative for dysuria and hematuria.   Musculoskeletal: Negative for back pain and joint pain.   Neurological: Negative for dizziness, sensory change, speech change and headaches.   Psychiatric/Behavioral: Negative for depression. The patient is not nervous/anxious.         Physical Exam  Temp:  [36.3 °C (97.3 °F)-37 °C (98.6 °F)] 36.4 °C (97.6 °F)  Pulse:  [54-64] 62  Resp:  [14-17] 16  BP: ()/() 178/76  SpO2:  [90 %-97 %] 93 %    Physical Exam  Vitals reviewed.   Constitutional:       Appearance: She is not diaphoretic.      Comments: frail   HENT:      Head: Normocephalic and atraumatic.      Nose: Nose normal.      Mouth/Throat:      Mouth: Mucous membranes are moist.      Pharynx: No oropharyngeal  exudate.      Comments: Dentures loosely adherent  Eyes:      General: No scleral icterus.        Right eye: No discharge.         Left eye: No discharge.      Extraocular Movements: Extraocular movements intact.      Conjunctiva/sclera: Conjunctivae normal.   Cardiovascular:      Rate and Rhythm: Normal rate and regular rhythm.      Pulses:           Radial pulses are 2+ on the right side and 2+ on the left side.        Dorsalis pedis pulses are 2+ on the right side and 2+ on the left side.      Heart sounds: No murmur.   Pulmonary:      Effort: Pulmonary effort is normal. No respiratory distress.      Breath sounds: Normal breath sounds. No wheezing or rales.   Abdominal:      General: Bowel sounds are normal. There is no distension.      Palpations: Abdomen is soft.   Musculoskeletal:         General: No swelling or tenderness.      Cervical back: No muscular tenderness.      Right lower leg: No edema.      Left lower leg: No edema.   Skin:     Coloration: Skin is pale. Skin is not jaundiced.   Neurological:      General: No focal deficit present.      Mental Status: She is alert and oriented to person, place, and time. Mental status is at baseline.      Cranial Nerves: No cranial nerve deficit.   Psychiatric:         Mood and Affect: Mood normal.         Behavior: Behavior normal.         Fluids    Intake/Output Summary (Last 24 hours) at 2/12/2021 0848  Last data filed at 2/12/2021 0343  Gross per 24 hour   Intake 203.33 ml   Output 750 ml   Net -546.67 ml       Laboratory  Recent Labs     02/09/21  1520 02/10/21  0235 02/11/21  0445   WBC 10.2 10.1 7.2   RBC 4.80 3.93* 3.27*   HEMOGLOBIN 14.9 12.7 10.5*   HEMATOCRIT 44.9 37.0 31.3*   MCV 93.5 94.1 95.7   MCH 31.0 32.3 32.1   MCHC 33.2* 34.3 33.5*   RDW 46.8 47.1 48.4   PLATELETCT 361 270 198   MPV 10.4 10.3 10.3     Recent Labs     02/10/21  0235 02/10/21  0545 02/10/21  1710 02/11/21  0445   SODIUM 129* 131*  --  137   POTASSIUM 5.4 5.6* 4.7 4.1   CHLORIDE 98  100  --  109   CO2 19* 20  --  19*   GLUCOSE 132* 156*  --  102*   * 127*  --  94*   CREATININE 2.47* 2.45*  --  1.57*   CALCIUM 9.3 9.3  --  8.2*     Recent Labs     02/09/21  2250   INR 1.28*               Imaging  EC-ECHOCARDIOGRAM COMPLETE W/ CONT   Final Result      DX-CHEST-LIMITED (1 VIEW)   Final Result         1.  No acute cardiopulmonary disease.   2.  Cardiomegaly   3.  Atherosclerosis      DX-CHEST-PORTABLE (1 VIEW)   Final Result      No acute cardiopulmonary abnormality identified.           Assessment/Plan  * Acute renal failure superimposed on stage 3 chronic kidney disease (HCC)- (present on admission)  Assessment & Plan  Creatinine 3.67 with potassium 6.6 and peaked T waves on EKG.  Stop patient's  Torsemide 100 mg daily and spironolactone 25 mg daily.  IV fluids  2/11 BUN:94, Cr:1.57  2/12 check labs  Restart lisinopril for HFrEF 40-45% if Cr allows.    Hyperkalemia  Assessment & Plan  Elevated 6.6 on admission.  With peaked T waves.  Required hemodialysis  2/11 K:4.1    Chronic systolic heart failure (HCC)- (present on admission)  Assessment & Plan  History of congestive heart failure on diuretics.  Monitor while off diuretics and given sepsis loading of IV fluids.    Pulmonary hypertension (HCC)- (present on admission)  Assessment & Plan  History of pulmonary hypertension.  Supplemental oxygen    Chronic atrial fibrillation (HCC)- (present on admission)  Assessment & Plan  Restart coreg w/ parameters and restart apixaban.  Monitor vitals and on telemetry    DM type 2 (diabetes mellitus, type 2) ([facility])- (present on admission)  Assessment & Plan  Monitor accuchecks and cover with sliding scale insulin  Hemoglobin A1c 6.5% in past 4 months.  Diabetic diet.    Essential hypertension- (present on admission)  Assessment & Plan  Restart lisinopril and coreg 2/12.  Monitor bmp and vitals.  Hold aldactone and torsemide for now (not volume overloaded at this time).    Acute cystitis without  hematuria- (present on admission)  Assessment & Plan  Rocephin  Ecoli  2/11 WBC:7.2  Purewick    Hypercalcemia  Assessment & Plan  Resolved  Monitor labs      CAD (coronary artery disease)- (present on admission)  Assessment & Plan  Restart Home BP meds if Cr allows.    2/12 overnight required prn hydralazine per RN.  Monitor vitals.       VTE prophylaxis: Heparin

## 2021-02-13 LAB
ANION GAP SERPL CALC-SCNC: 9 MMOL/L (ref 7–16)
BUN SERPL-MCNC: 38 MG/DL (ref 8–22)
CALCIUM SERPL-MCNC: 9.1 MG/DL (ref 8.5–10.5)
CHLORIDE SERPL-SCNC: 108 MMOL/L (ref 96–112)
CO2 SERPL-SCNC: 19 MMOL/L (ref 20–33)
CREAT SERPL-MCNC: 0.91 MG/DL (ref 0.5–1.4)
EKG IMPRESSION: NORMAL
ERYTHROCYTE [DISTWIDTH] IN BLOOD BY AUTOMATED COUNT: 49 FL (ref 35.9–50)
GLUCOSE SERPL-MCNC: 131 MG/DL (ref 65–99)
HCT VFR BLD AUTO: 35.6 % (ref 37–47)
HGB BLD-MCNC: 11.8 G/DL (ref 12–16)
MAGNESIUM SERPL-MCNC: 1.9 MG/DL (ref 1.5–2.5)
MCH RBC QN AUTO: 31.6 PG (ref 27–33)
MCHC RBC AUTO-ENTMCNC: 33.1 G/DL (ref 33.6–35)
MCV RBC AUTO: 95.4 FL (ref 81.4–97.8)
PHOSPHATE SERPL-MCNC: 2 MG/DL (ref 2.5–4.5)
PLATELET # BLD AUTO: 208 K/UL (ref 164–446)
PMV BLD AUTO: 10.4 FL (ref 9–12.9)
POTASSIUM SERPL-SCNC: 4.3 MMOL/L (ref 3.6–5.5)
RBC # BLD AUTO: 3.73 M/UL (ref 4.2–5.4)
SODIUM SERPL-SCNC: 136 MMOL/L (ref 135–145)
WBC # BLD AUTO: 9.3 K/UL (ref 4.8–10.8)

## 2021-02-13 PROCEDURE — 93005 ELECTROCARDIOGRAM TRACING: CPT | Performed by: HOSPITALIST

## 2021-02-13 PROCEDURE — 84100 ASSAY OF PHOSPHORUS: CPT

## 2021-02-13 PROCEDURE — 83735 ASSAY OF MAGNESIUM: CPT

## 2021-02-13 PROCEDURE — 700102 HCHG RX REV CODE 250 W/ 637 OVERRIDE(OP): Performed by: HOSPITALIST

## 2021-02-13 PROCEDURE — 36415 COLL VENOUS BLD VENIPUNCTURE: CPT

## 2021-02-13 PROCEDURE — 770020 HCHG ROOM/CARE - TELE (206)

## 2021-02-13 PROCEDURE — A9270 NON-COVERED ITEM OR SERVICE: HCPCS | Performed by: HOSPITALIST

## 2021-02-13 PROCEDURE — 85027 COMPLETE CBC AUTOMATED: CPT

## 2021-02-13 PROCEDURE — 99232 SBSQ HOSP IP/OBS MODERATE 35: CPT | Performed by: HOSPITALIST

## 2021-02-13 PROCEDURE — 97166 OT EVAL MOD COMPLEX 45 MIN: CPT

## 2021-02-13 PROCEDURE — 700111 HCHG RX REV CODE 636 W/ 250 OVERRIDE (IP): Performed by: HOSPITALIST

## 2021-02-13 PROCEDURE — 97162 PT EVAL MOD COMPLEX 30 MIN: CPT

## 2021-02-13 PROCEDURE — 700111 HCHG RX REV CODE 636 W/ 250 OVERRIDE (IP): Performed by: INTERNAL MEDICINE

## 2021-02-13 PROCEDURE — 93010 ELECTROCARDIOGRAM REPORT: CPT | Performed by: INTERNAL MEDICINE

## 2021-02-13 PROCEDURE — 700105 HCHG RX REV CODE 258: Performed by: INTERNAL MEDICINE

## 2021-02-13 PROCEDURE — 80048 BASIC METABOLIC PNL TOTAL CA: CPT

## 2021-02-13 RX ORDER — MAGNESIUM SULFATE HEPTAHYDRATE 40 MG/ML
2 INJECTION, SOLUTION INTRAVENOUS ONCE
Status: COMPLETED | OUTPATIENT
Start: 2021-02-13 | End: 2021-02-13

## 2021-02-13 RX ADMIN — APIXABAN 2.5 MG: 2.5 TABLET, FILM COATED ORAL at 17:30

## 2021-02-13 RX ADMIN — CEFTRIAXONE SODIUM 1 G: 1 INJECTION, POWDER, FOR SOLUTION INTRAMUSCULAR; INTRAVENOUS at 17:30

## 2021-02-13 RX ADMIN — CARVEDILOL 6.25 MG: 6.25 TABLET, FILM COATED ORAL at 08:27

## 2021-02-13 RX ADMIN — MAGNESIUM SULFATE 2 G: 2 INJECTION INTRAVENOUS at 11:01

## 2021-02-13 RX ADMIN — LISINOPRIL 20 MG: 20 TABLET ORAL at 08:27

## 2021-02-13 RX ADMIN — ATORVASTATIN CALCIUM 80 MG: 80 TABLET, FILM COATED ORAL at 17:29

## 2021-02-13 RX ADMIN — APIXABAN 2.5 MG: 2.5 TABLET, FILM COATED ORAL at 05:19

## 2021-02-13 ASSESSMENT — COGNITIVE AND FUNCTIONAL STATUS - GENERAL
SUGGESTED CMS G CODE MODIFIER MOBILITY: CL
SUGGESTED CMS G CODE MODIFIER DAILY ACTIVITY: CK
CLIMB 3 TO 5 STEPS WITH RAILING: TOTAL
DRESSING REGULAR UPPER BODY CLOTHING: A LOT
HELP NEEDED FOR BATHING: A LOT
TURNING FROM BACK TO SIDE WHILE IN FLAT BAD: A LITTLE
PERSONAL GROOMING: A LITTLE
DRESSING REGULAR LOWER BODY CLOTHING: A LOT
MOVING TO AND FROM BED TO CHAIR: A LITTLE
HELP NEEDED FOR BATHING: A LOT
SUGGESTED CMS G CODE MODIFIER MOBILITY: CL
MOVING TO AND FROM BED TO CHAIR: A LITTLE
DAILY ACTIVITIY SCORE: 15
MOVING FROM LYING ON BACK TO SITTING ON SIDE OF FLAT BED: A LOT
SUGGESTED CMS G CODE MODIFIER DAILY ACTIVITY: CK
TOILETING: A LOT
DAILY ACTIVITIY SCORE: 16
TURNING FROM BACK TO SIDE WHILE IN FLAT BAD: A LITTLE
CLIMB 3 TO 5 STEPS WITH RAILING: TOTAL
TOILETING: A LOT
MOVING FROM LYING ON BACK TO SITTING ON SIDE OF FLAT BED: A LITTLE
WALKING IN HOSPITAL ROOM: A LOT
WALKING IN HOSPITAL ROOM: A LOT
DRESSING REGULAR UPPER BODY CLOTHING: A LITTLE
DRESSING REGULAR LOWER BODY CLOTHING: A LOT
STANDING UP FROM CHAIR USING ARMS: A LOT
MOBILITY SCORE: 14
MOBILITY SCORE: 13
STANDING UP FROM CHAIR USING ARMS: A LOT
PERSONAL GROOMING: A LITTLE

## 2021-02-13 ASSESSMENT — ENCOUNTER SYMPTOMS
SPEECH CHANGE: 0
BACK PAIN: 0
NAUSEA: 0
HEADACHES: 0
PALPITATIONS: 0
DEPRESSION: 0
COUGH: 0
FEVER: 0
SHORTNESS OF BREATH: 0
EYE DISCHARGE: 0
ABDOMINAL PAIN: 0
VOMITING: 0
NERVOUS/ANXIOUS: 0
STRIDOR: 0
DIZZINESS: 0
DIARRHEA: 0
CHILLS: 0
SENSORY CHANGE: 0

## 2021-02-13 ASSESSMENT — GAIT ASSESSMENTS
GAIT LEVEL OF ASSIST: MINIMAL ASSIST
DISTANCE (FEET): 5
DEVIATION: BRADYKINETIC;OTHER (COMMENT)
DISTANCE (FEET): 5
ASSISTIVE DEVICE: FRONT WHEEL WALKER

## 2021-02-13 ASSESSMENT — ACTIVITIES OF DAILY LIVING (ADL): TOILETING: INDEPENDENT

## 2021-02-13 ASSESSMENT — FIBROSIS 4 INDEX: FIB4 SCORE: 1.72

## 2021-02-13 ASSESSMENT — PAIN DESCRIPTION - PAIN TYPE: TYPE: ACUTE PAIN

## 2021-02-13 NOTE — PROGRESS NOTES
Monitor summary     SB-SR 55-73  .34/.08/.40   1st degree AV block   (R) PAC   (R/O) PVC  I/O 2 degree type 1 beats   (F) PVC   Coup

## 2021-02-13 NOTE — PROGRESS NOTES
Hospital Medicine Daily Progress Note    Date of Service  2/13/2021    Chief Complaint  Weakness and GLF    Hospital Course  85 y.o. female with CHF, CAD w/ CABG, HTN, PVD, DMII, afib admitted 2/9/2021 with acute renal failure and initial K:6.6 with Cr:3.67.  She required emergent hemodialysis. She was hypotensive in the ER and admitted to the ICU with possible Sepsis from a urinary source (ecoli).      Interval Problem Update  Awake.  Appears frail and weak.  Care discussed with T7 Rn.    Consultants/Specialty  Nephrology  Intensivist (signing off 2/11)    Code Status  Full Code    Disposition  Assess home safety with PT/OT for possible near future discharge to home vs need of SNF.    Review of Systems  Review of Systems   Constitutional: Negative for chills, fever and malaise/fatigue.   Eyes: Negative for discharge.   Respiratory: Negative for cough, shortness of breath and stridor.    Cardiovascular: Negative for chest pain, palpitations and leg swelling.   Gastrointestinal: Negative for abdominal pain, diarrhea, nausea and vomiting.   Genitourinary: Negative for dysuria and hematuria.   Musculoskeletal: Negative for back pain and joint pain.   Neurological: Negative for dizziness, sensory change, speech change and headaches.   Psychiatric/Behavioral: Negative for depression. The patient is not nervous/anxious.         Physical Exam  Temp:  [36 °C (96.8 °F)-36.7 °C (98 °F)] 36.1 °C (97 °F)  Pulse:  [60-71] 65  Resp:  [16-18] 16  BP: (100-149)/(50-81) 105/59  SpO2:  [93 %-97 %] 95 %    Physical Exam  Vitals reviewed.   Constitutional:       Appearance: She is not diaphoretic.      Comments: frail   HENT:      Head: Normocephalic and atraumatic.      Nose: Nose normal.      Mouth/Throat:      Mouth: Mucous membranes are moist.      Pharynx: No oropharyngeal exudate.      Comments: Dentures loosely adherent  Eyes:      General: No scleral icterus.        Right eye: No discharge.         Left eye: No discharge.       Extraocular Movements: Extraocular movements intact.      Conjunctiva/sclera: Conjunctivae normal.   Cardiovascular:      Rate and Rhythm: Normal rate and regular rhythm.      Pulses:           Radial pulses are 2+ on the right side and 2+ on the left side.        Dorsalis pedis pulses are 2+ on the right side and 2+ on the left side.      Heart sounds: No murmur.   Pulmonary:      Effort: Pulmonary effort is normal. No respiratory distress.      Breath sounds: Normal breath sounds. No wheezing or rales.   Abdominal:      General: Bowel sounds are normal. There is no distension.      Palpations: Abdomen is soft.   Musculoskeletal:         General: No swelling or tenderness.      Cervical back: No muscular tenderness.      Right lower leg: No edema.      Left lower leg: No edema.   Skin:     Coloration: Skin is pale. Skin is not jaundiced.   Neurological:      General: No focal deficit present.      Mental Status: She is alert and oriented to person, place, and time. Mental status is at baseline.      Cranial Nerves: No cranial nerve deficit.   Psychiatric:         Mood and Affect: Mood normal.         Behavior: Behavior normal.         Fluids    Intake/Output Summary (Last 24 hours) at 2/13/2021 0808  Last data filed at 2/13/2021 0500  Gross per 24 hour   Intake 600 ml   Output 1555 ml   Net -955 ml       Laboratory  Recent Labs     02/11/21  0445 02/13/21  0506   WBC 7.2 9.3   RBC 3.27* 3.73*   HEMOGLOBIN 10.5* 11.8*   HEMATOCRIT 31.3* 35.6*   MCV 95.7 95.4   MCH 32.1 31.6   MCHC 33.5* 33.1*   RDW 48.4 49.0   PLATELETCT 198 208   MPV 10.3 10.4     Recent Labs     02/11/21  0445 02/12/21  1054 02/13/21  0506   SODIUM 137 135 136   POTASSIUM 4.1 4.1 4.3   CHLORIDE 109 107 108   CO2 19* 18* 19*   GLUCOSE 102* 277* 131*   BUN 94* 51* 38*   CREATININE 1.57* 0.90 0.91   CALCIUM 8.2* 8.9 9.1                   Imaging  EC-ECHOCARDIOGRAM COMPLETE W/ CONT   Final Result      DX-CHEST-LIMITED (1 VIEW)   Final Result          1.  No acute cardiopulmonary disease.   2.  Cardiomegaly   3.  Atherosclerosis      DX-CHEST-PORTABLE (1 VIEW)   Final Result      No acute cardiopulmonary abnormality identified.           Assessment/Plan  * Acute renal failure superimposed on stage 3 chronic kidney disease (HCC)- (present on admission)  Assessment & Plan  Creatinine 3.67 with potassium 6.6 and peaked T waves on EKG.  Stop patient's  Torsemide 100 mg daily and spironolactone 25 mg daily.  IV fluids  2/11 BUN:94, Cr:1.57  2/12 check labs  Restart lisinopril for HFrEF 40-45% if Cr allows.    Hyperkalemia  Assessment & Plan  Elevated 6.6 on admission.  With peaked T waves.  Required hemodialysis  2/11 K:4.1    Chronic systolic heart failure (HCC)- (present on admission)  Assessment & Plan  History of congestive heart failure on diuretics.  Monitor while off diuretics and given sepsis loading of IV fluids.    Pulmonary hypertension (HCC)- (present on admission)  Assessment & Plan  History of pulmonary hypertension.  Supplemental oxygen    Chronic atrial fibrillation (HCC)- (present on admission)  Assessment & Plan  Restart coreg w/ parameters and restart apixaban.  Monitor vitals and on telemetry    DM type 2 (diabetes mellitus, type 2) ([facility])- (present on admission)  Assessment & Plan  Monitor accuchecks and cover with sliding scale insulin  Hemoglobin A1c 6.5% in past 4 months.  Diabetic diet.    Essential hypertension- (present on admission)  Assessment & Plan  Restart lisinopril and coreg 2/12.  Monitor bmp and vitals.  Hold aldactone and torsemide for now (not volume overloaded at this time).    Acute cystitis without hematuria- (present on admission)  Assessment & Plan  Rocephin  Ecoli  2/11 WBC:7.2  Purewick    Hypercalcemia  Assessment & Plan  Resolved  Monitor labs      CAD (coronary artery disease)- (present on admission)  Assessment & Plan  Restart Home BP meds if Cr allows.    2/12 overnight required prn hydralazine per RN.  Monitor  vitals.       VTE prophylaxis: Heparin

## 2021-02-13 NOTE — PROGRESS NOTES
Notified by monitor room pt had 1 beat of 2nd degree type 1 rhythm. Pt asymptomatic, A&Ox4, and no complaints at this time.

## 2021-02-13 NOTE — CARE PLAN
Problem: Communication  Goal: The ability to communicate needs accurately and effectively will improve  Outcome: PROGRESSING AS EXPECTED     Problem: Safety  Goal: Will remain free from falls  Outcome: PROGRESSING AS EXPECTED     Problem: Infection  Goal: Will remain free from infection  Outcome: PROGRESSING AS EXPECTED     Problem: Venous Thromboembolism (VTW)/Deep Vein Thrombosis (DVT) Prevention:  Goal: Patient will participate in Venous Thrombosis (VTE)/Deep Vein Thrombosis (DVT)Prevention Measures  Outcome: PROGRESSING AS EXPECTED     Problem: Knowledge Deficit  Goal: Knowledge of disease process/condition, treatment plan, diagnostic tests, and medications will improve  Outcome: PROGRESSING AS EXPECTED   During initial assessment, pt stated she was confused as to what has been going on. Updated pt on condition and treatment plan. All questions answered.

## 2021-02-13 NOTE — THERAPY
Physical Therapy   Initial Evaluation     Patient Name: Ana Torres  Age:  85 y.o., Sex:  female  Medical Record #: [id number]  Today's Date: 2/13/2021     Precautions: Fall Risk    Assessment  Patient is 85 y.o. female with a diagnosis of acute renal failure.  She was living alone in a 1 story home w/ a son who provided transport and checked in on her regularly.  The pt reports that she was previously performing functional mobility tasks w/ a w/c around the home, and was independent w/ bed mobility and transfers.  Currently, the pt requires Leena for bed mobility, and CGA for transfers.  During stand-step transfer EOB to chair using FWW, the pt demonstrated difficulty initiating stepping w/ the R LE requiring multiple VC/TC.  The pt would benefit from acute PT to address bed mobility and transfer limitations.  As of right now, recommend placement, however pt may progress w/ acute PT to return home at [facility].  Will follow.       Plan    Recommend Physical Therapy 3 times per week until therapy goals are met for the following treatments:  Bed Mobility, Community Re-integration, Equipment, Gait Training, Manual Therapy, Neuro Re-Education / Balance, Orthotics Training, Self Care/Home Evaluation, Stair Training, Therapeutic Activities and Therapeutic Exercises    DC Equipment Recommendations: Unable to determine at this time  Discharge Recommendations: Other -(Recommend placement as of today, may progress to return home)       Subjective/Objective       02/13/21 1045   Prior Living Situation   Prior Services Intermittent Physical Support for ADL Per Family;Meals on Wheels   Housing / Facility 1 Story House   Steps Into Home 1   Equipment Owned Front-Wheel Walker;Wheelchair;Single Point Cane   Lives with - Patient's Self Care Capacity Alone and Able to Care For Self   Comments Pts son checks on pt often, and assists w/ transport to Dr. appointments and performs all the shopping.   Prior Level of Functional Mobility    Bed Mobility Independent   Transfer Status Independent   Ambulation   (Pt reports performing functional mobility tasks w/ w/c)   Assistive Devices Used Wheelchair   Wheelchair Independent   History of Falls   History of Falls No   Cognition    Cognition / Consciousness WDL   Level of Consciousness Alert   Strength Lower Body   Lower Body Strength  WDL   Comments Observed during functional mobility tasks   Sensation Lower Body   Lower Extremity Sensation   WDL   Comments Pt reports no LE numbness/tingling   Coordination Lower Body    Coordination Lower Body  X   Comments Pt demonstrated difficulty initiating steps w/ the R LE   Balance Assessment   Sitting Balance (Static) Fair   Sitting Balance (Dynamic) Fair   Standing Balance (Static) Fair -   Standing Balance (Dynamic) Fair -   Weight Shift Sitting Fair   Weight Shift Standing Poor   Comments FWW for standing balance   Gait Analysis   Gait Level Of Assist Minimal Assist  (CGA)   Assistive Device Front Wheel Walker   Distance (Feet) 5   # of Times Distance was Traveled 1   Deviation Bradykinetic;Other (Comment)  (limited toe clearance)   Comments Difficulty initiating stepping w/ R LE requiring consistent VC/TC from the therapist   Bed Mobility    Supine to Sit Minimal Assist   Sit to Supine   (Pt position seated in chair at end of tx)   Functional Mobility   Sit to Stand Minimal Assist  (B UE push off to initiate)   Bed, Chair, Wheelchair Transfer Minimal Assist  (CGA)   Transfer Method Stand Step   Mobility FWW for transfer   How much difficulty does the patient currently have...   Turning over in bed (including adjusting bedclothes, sheets and blankets)? 3   Sitting down on and standing up from a chair with arms (e.g., wheelchair, bedside commode, etc.) 3   Moving from lying on back to sitting on the side of the bed? 3   How much help from another person does the patient currently need...   Moving to and from a bed to a chair (including a wheelchair)? 2    Need to walk in a hospital room? 2   Climbing 3-5 steps with a railing? 1   6 clicks Mobility Score 14   Activity Tolerance   Sitting in Chair >10 min   Sitting Edge of Bed 15 min   Standing 5 min   Edema / Skin Assessment   Edema / Skin  WDL   Short Term Goals    Short Term Goal # 1 Pt will perform supine<>sit EOB w/o bed features spv in 6 visits to prepare for transfers.   Short Term Goal # 2 Pt will walk 150' using FWW spv in a moving environment for household ambulation in 6 visits (if appropriate).   Short Term Goal # 3 Pt will SPT EOB<>w/c using FWW for UE support spv in 6 visits for functional mobility.   Education Group   Education Provided Role of Physical Therapist;Exercises - Seated;Exercises - Supine   Role of Physical Therapist Patient Response Patient;Acceptance;Explanation;Demonstration;Verbal Demonstration;Action Demonstration   Exercises - Supine Patient Response Patient;Acceptance;Explanation;Demonstration;Verbal Demonstration;Action Demonstration   Exercises - Seated Patient Response Patient;Acceptance;Explanation;Demonstration;Verbal Demonstration;Action Demonstration   Additional Comments Pt instructed on performance of LAQ's in sitting and ankle pumps in supine, demonstrated back to therapist   Problem List    Problems Pain;Impaired Bed Mobility;Impaired Transfers;Impaired Ambulation;Impaired Balance;Impaired Coordination;Decreased Activity Tolerance;Motor Planning / Sequencing   Anticipated Discharge Equipment and Recommendations   DC Equipment Recommendations Unable to determine at this time   Discharge Recommendations Other -  (Recommend placement as of today, may progress to return home)

## 2021-02-13 NOTE — PROGRESS NOTES
Notified by monitor room pt having rare, but increased beats of 2nd degree type 1 beats. Pt asymptomatic. Will continue to monitor

## 2021-02-14 LAB
ANION GAP SERPL CALC-SCNC: 7 MMOL/L (ref 7–16)
BACTERIA BLD CULT: NORMAL
BACTERIA BLD CULT: NORMAL
BASOPHILS # BLD AUTO: 0.4 % (ref 0–1.8)
BASOPHILS # BLD: 0.04 K/UL (ref 0–0.12)
BUN SERPL-MCNC: 26 MG/DL (ref 8–22)
CALCIUM SERPL-MCNC: 9.3 MG/DL (ref 8.5–10.5)
CHLORIDE SERPL-SCNC: 105 MMOL/L (ref 96–112)
CO2 SERPL-SCNC: 20 MMOL/L (ref 20–33)
CREAT SERPL-MCNC: 0.89 MG/DL (ref 0.5–1.4)
EOSINOPHIL # BLD AUTO: 0.15 K/UL (ref 0–0.51)
EOSINOPHIL NFR BLD: 1.6 % (ref 0–6.9)
ERYTHROCYTE [DISTWIDTH] IN BLOOD BY AUTOMATED COUNT: 48.5 FL (ref 35.9–50)
GLUCOSE SERPL-MCNC: 147 MG/DL (ref 65–99)
HCT VFR BLD AUTO: 38.4 % (ref 37–47)
HGB BLD-MCNC: 12.7 G/DL (ref 12–16)
IMM GRANULOCYTES # BLD AUTO: 0.05 K/UL (ref 0–0.11)
IMM GRANULOCYTES NFR BLD AUTO: 0.5 % (ref 0–0.9)
LYMPHOCYTES # BLD AUTO: 0.96 K/UL (ref 1–4.8)
LYMPHOCYTES NFR BLD: 10.1 % (ref 22–41)
MAGNESIUM SERPL-MCNC: 2 MG/DL (ref 1.5–2.5)
MCH RBC QN AUTO: 31.2 PG (ref 27–33)
MCHC RBC AUTO-ENTMCNC: 33.1 G/DL (ref 33.6–35)
MCV RBC AUTO: 94.3 FL (ref 81.4–97.8)
MONOCYTES # BLD AUTO: 1.05 K/UL (ref 0–0.85)
MONOCYTES NFR BLD AUTO: 11.1 % (ref 0–13.4)
NEUTROPHILS # BLD AUTO: 7.22 K/UL (ref 2–7.15)
NEUTROPHILS NFR BLD: 76.3 % (ref 44–72)
NRBC # BLD AUTO: 0 K/UL
NRBC BLD-RTO: 0 /100 WBC
PHOSPHATE SERPL-MCNC: 1.7 MG/DL (ref 2.5–4.5)
PLATELET # BLD AUTO: 217 K/UL (ref 164–446)
PMV BLD AUTO: 10.5 FL (ref 9–12.9)
POTASSIUM SERPL-SCNC: 4.8 MMOL/L (ref 3.6–5.5)
RBC # BLD AUTO: 4.07 M/UL (ref 4.2–5.4)
SIGNIFICANT IND 70042: NORMAL
SIGNIFICANT IND 70042: NORMAL
SITE SITE: NORMAL
SITE SITE: NORMAL
SODIUM SERPL-SCNC: 132 MMOL/L (ref 135–145)
SOURCE SOURCE: NORMAL
SOURCE SOURCE: NORMAL
WBC # BLD AUTO: 9.5 K/UL (ref 4.8–10.8)

## 2021-02-14 PROCEDURE — 36415 COLL VENOUS BLD VENIPUNCTURE: CPT

## 2021-02-14 PROCEDURE — 85025 COMPLETE CBC W/AUTO DIFF WBC: CPT

## 2021-02-14 PROCEDURE — 80048 BASIC METABOLIC PNL TOTAL CA: CPT

## 2021-02-14 PROCEDURE — 83735 ASSAY OF MAGNESIUM: CPT

## 2021-02-14 PROCEDURE — A9270 NON-COVERED ITEM OR SERVICE: HCPCS | Performed by: HOSPITALIST

## 2021-02-14 PROCEDURE — 770020 HCHG ROOM/CARE - TELE (206)

## 2021-02-14 PROCEDURE — 84100 ASSAY OF PHOSPHORUS: CPT

## 2021-02-14 PROCEDURE — 99232 SBSQ HOSP IP/OBS MODERATE 35: CPT | Performed by: HOSPITALIST

## 2021-02-14 PROCEDURE — 700102 HCHG RX REV CODE 250 W/ 637 OVERRIDE(OP): Performed by: HOSPITALIST

## 2021-02-14 RX ORDER — AMLODIPINE BESYLATE 5 MG/1
5 TABLET ORAL
Status: DISCONTINUED | OUTPATIENT
Start: 2021-02-14 | End: 2021-02-18 | Stop reason: HOSPADM

## 2021-02-14 RX ADMIN — CARVEDILOL 6.25 MG: 6.25 TABLET, FILM COATED ORAL at 08:22

## 2021-02-14 RX ADMIN — CARVEDILOL 6.25 MG: 6.25 TABLET, FILM COATED ORAL at 18:25

## 2021-02-14 RX ADMIN — DIBASIC SODIUM PHOSPHATE, MONOBASIC POTASSIUM PHOSPHATE AND MONOBASIC SODIUM PHOSPHATE 250 MG: 852; 155; 130 TABLET ORAL at 18:25

## 2021-02-14 RX ADMIN — AMLODIPINE BESYLATE 5 MG: 5 TABLET ORAL at 10:17

## 2021-02-14 RX ADMIN — LISINOPRIL 20 MG: 20 TABLET ORAL at 08:22

## 2021-02-14 RX ADMIN — ATORVASTATIN CALCIUM 80 MG: 80 TABLET, FILM COATED ORAL at 18:26

## 2021-02-14 RX ADMIN — APIXABAN 2.5 MG: 2.5 TABLET, FILM COATED ORAL at 18:25

## 2021-02-14 RX ADMIN — DIBASIC SODIUM PHOSPHATE, MONOBASIC POTASSIUM PHOSPHATE AND MONOBASIC SODIUM PHOSPHATE 250 MG: 852; 155; 130 TABLET ORAL at 12:36

## 2021-02-14 RX ADMIN — DIBASIC SODIUM PHOSPHATE, MONOBASIC POTASSIUM PHOSPHATE AND MONOBASIC SODIUM PHOSPHATE 250 MG: 852; 155; 130 TABLET ORAL at 08:22

## 2021-02-14 RX ADMIN — APIXABAN 2.5 MG: 2.5 TABLET, FILM COATED ORAL at 05:04

## 2021-02-14 ASSESSMENT — ENCOUNTER SYMPTOMS
NERVOUS/ANXIOUS: 0
DIARRHEA: 0
HEADACHES: 0
SENSORY CHANGE: 0
ABDOMINAL PAIN: 0
BACK PAIN: 0
EYE DISCHARGE: 0
STRIDOR: 0
DEPRESSION: 0
COUGH: 0
SHORTNESS OF BREATH: 0
DIZZINESS: 0
PALPITATIONS: 0
SPEECH CHANGE: 0
NAUSEA: 0
FEVER: 0

## 2021-02-14 ASSESSMENT — FIBROSIS 4 INDEX: FIB4 SCORE: 1.65

## 2021-02-14 NOTE — CARE PLAN
Problem: Safety  Goal: Will remain free from falls  Outcome: MET     Problem: Infection  Goal: Will remain free from infection  Outcome: MET

## 2021-02-14 NOTE — THERAPY
Occupational Therapy   Initial Evaluation     Patient Name: Ana Torres  Age:  85 y.o., Sex:  female  Medical Record #: [id number]  Today's Date: 2/13/2021     Precautions  Precautions: Fall Risk    Assessment  Patient is 85 y.o. female with a diagnosis of GLF, acute renal failure and hyperkalemia. Pt currently demonstrates decreased activity tolerance, balance, and generalized weakness limiting pt's safety and independence with ADLs and mobility at this time. Pt will benefit from acute skilled OT services while in house and post acute placement recommended at this time.     Plan    Recommend Occupational Therapy 3 times per week until therapy goals are met for the following treatments:  Adaptive Equipment, Self Care/Activities of Daily Living, Therapeutic Activities and Therapeutic Exercises.    DC Equipment Recommendations: Unable to determine at this time  Discharge Recommendations: Recommend post-acute placement for additional occupational therapy services prior to discharge home      Objective       02/13/21 1002   Prior Living Situation   Prior Services Intermittent Physical Support for ADL Per Family;Meals on Wheels   Housing / Facility 1 Story House   Steps Into Home 1   Bathroom Set up Walk In Shower;Grab Bars   Equipment Owned Front-Wheel Walker;Wheelchair;Raised Toilet Seat Without Arms;Grab Bar(s) In Tub / Shower;Single Point Cane   Lives with - Patient's Self Care Capacity Alone and Able to Care For Self   Comments I with ADLs and medication. Reports son does grocery shopping and drives her to the appointments.    Prior Level of ADL Function   Self Feeding Independent   Grooming / Hygiene Independent   Bathing Independent   Dressing Independent   Toileting Independent   Prior Level of IADL Function   Medication Management Independent  (uses pillbox, son checks in as needed)   Laundry Independent   Kitchen Mobility Independent   Finances Independent   Home Management Requires Assist   Shopping  Requires Assist   Prior Level Of Mobility Independent With Device in Home   Driving / Transportation Relatives / Others Provide Transportation  (son)   Bed Mobility    Supine to Sit Minimal Assist   Sit to Supine   (UIC post session)   Scooting Supervised   Rolling Minimal Assist to Rt.   Comments slightly raised hob   ADL Assessment   Eating Modified Independent   Grooming Supervision;Seated   Bathing   (NT)   Upper Body Dressing Supervision   Lower Body Dressing Maximal Assist   Toileting Maximal Assist   Functional Mobility   Sit to Stand Minimal Assist   Bed, Chair, Wheelchair Transfer Minimal Assist   Toilet Transfers Refused  (declined need to use BR)   Transfer Method Stand Step   Mobility bed mobility, STS eob, eob->chair   Comments w/FWW    Short Term Goals   Short Term Goal # 1 Pt will perform LB dressing with min a    Short Term Goal # 2 Pt will perform functional t/f's with supervision   Short Term Goal # 3 Pt will perform toileting task with min a   Anticipated Discharge Equipment and Recommendations   DC Equipment Recommendations Unable to determine at this time

## 2021-02-14 NOTE — PROGRESS NOTES
Hospital Medicine Daily Progress Note    Date of Service  2/14/2021    Chief Complaint  Weakness and GLF    Hospital Course  85 y.o. female with CHF, CAD w/ CABG, HTN, PVD, DMII, afib admitted 2/9/2021 with acute renal failure and initial K:6.6 with Cr:3.67.  She required emergent hemodialysis. She was hypotensive in the ER and admitted to the ICU with possible Sepsis from a urinary source (ecoli).      Interval Problem Update  2/13: Awake sitting upright in bed eating breakfast. States she lives alone and has her son check in on her. She uses a walker and cane at home.  Has a purewick in place. Denies SOB. Required prn hydralazine overnight. Weak and ordered SNF.    2/14: Low Phos and replacing. Awake.  Await SNF placement.  Called and left message for her son Dank. Remains hypertensive and amlodipine added this am.    Consultants/Specialty  Nephrology  Intensivist (signing off 2/11)    Code Status  Full Code    Disposition  Await SNF.    Review of Systems  Review of Systems   Constitutional: Negative for fever and malaise/fatigue.   Eyes: Negative for discharge.   Respiratory: Negative for cough, shortness of breath and stridor.    Cardiovascular: Negative for chest pain, palpitations and leg swelling.   Gastrointestinal: Negative for abdominal pain, diarrhea and nausea.   Genitourinary: Negative for dysuria and hematuria.   Musculoskeletal: Negative for back pain and joint pain.   Neurological: Negative for dizziness, sensory change, speech change and headaches.   Psychiatric/Behavioral: Negative for depression. The patient is not nervous/anxious.         Physical Exam  Temp:  [35.9 °C (96.7 °F)-36.7 °C (98.1 °F)] 35.9 °C (96.7 °F)  Pulse:  [58-74] 60  Resp:  [16-20] 18  BP: (114-170)/() 114/83  SpO2:  [92 %-98 %] 98 %    Physical Exam  Vitals reviewed.   Constitutional:       Appearance: She is not diaphoretic.      Comments: frail   HENT:      Head: Normocephalic and atraumatic.      Nose: Nose normal.       Mouth/Throat:      Mouth: Mucous membranes are moist.      Pharynx: No oropharyngeal exudate.      Comments: Dentures loosely adherent  Eyes:      General: No scleral icterus.        Right eye: No discharge.         Left eye: No discharge.      Extraocular Movements: Extraocular movements intact.      Conjunctiva/sclera: Conjunctivae normal.   Cardiovascular:      Rate and Rhythm: Normal rate and regular rhythm.      Pulses:           Radial pulses are 2+ on the right side and 2+ on the left side.        Dorsalis pedis pulses are 2+ on the right side and 2+ on the left side.      Heart sounds: Murmur present.   Pulmonary:      Effort: Pulmonary effort is normal. No respiratory distress.      Breath sounds: Normal breath sounds. No wheezing.   Abdominal:      General: Bowel sounds are normal.      Palpations: Abdomen is soft.      Tenderness: There is no abdominal tenderness.   Musculoskeletal:         General: No swelling or tenderness.      Cervical back: No muscular tenderness.      Right lower leg: No edema.      Left lower leg: No edema.   Skin:     Coloration: Skin is pale. Skin is not jaundiced.   Neurological:      General: No focal deficit present.      Mental Status: She is alert and oriented to person, place, and time. Mental status is at baseline.      Cranial Nerves: No cranial nerve deficit.   Psychiatric:         Mood and Affect: Mood normal.         Behavior: Behavior normal.         Fluids    Intake/Output Summary (Last 24 hours) at 2/14/2021 1419  Last data filed at 2/14/2021 0800  Gross per 24 hour   Intake 240 ml   Output 200 ml   Net 40 ml       Laboratory  Recent Labs     02/13/21  0506 02/14/21  0302   WBC 9.3 9.5   RBC 3.73* 4.07*   HEMOGLOBIN 11.8* 12.7   HEMATOCRIT 35.6* 38.4   MCV 95.4 94.3   MCH 31.6 31.2   MCHC 33.1* 33.1*   RDW 49.0 48.5   PLATELETCT 208 217   MPV 10.4 10.5     Recent Labs     02/12/21  1054 02/13/21  0506 02/14/21  0302   SODIUM 135 136 132*   POTASSIUM 4.1 4.3 4.8    CHLORIDE 107 108 105   CO2 18* 19* 20   GLUCOSE 277* 131* 147*   BUN 51* 38* 26*   CREATININE 0.90 0.91 0.89   CALCIUM 8.9 9.1 9.3                   Imaging  EC-ECHOCARDIOGRAM COMPLETE W/ CONT   Final Result      DX-CHEST-LIMITED (1 VIEW)   Final Result         1.  No acute cardiopulmonary disease.   2.  Cardiomegaly   3.  Atherosclerosis      DX-CHEST-PORTABLE (1 VIEW)   Final Result      No acute cardiopulmonary abnormality identified.           Assessment/Plan  * Acute renal failure superimposed on stage 3 chronic kidney disease (HCC)- (present on admission)  Assessment & Plan  Creatinine 3.67 with potassium 6.6 and peaked T waves on EKG.  Stop patient's  Torsemide 100 mg daily and spironolactone 25 mg daily.  IV fluids  2/11 BUN:94, Cr:1.57  2/12 check labs  Restart lisinopril for HFrEF 40-45% if Cr allows.  2/14 BUN:26, Cr:0.89    Hyperkalemia  Assessment & Plan  Elevated 6.6 on admission.  With peaked T waves.  Required hemodialysis  2/11 K:4.1  2/14 K:4.8    Chronic systolic heart failure (HCC)- (present on admission)  Assessment & Plan  History of congestive heart failure on diuretics.  Monitor while off diuretics and s/p sepsis loading of IV fluids.    Pulmonary hypertension (HCC)- (present on admission)  Assessment & Plan  History of pulmonary hypertension.  Supplemental oxygen    Chronic atrial fibrillation (HCC)- (present on admission)  Assessment & Plan  Restart coreg w/ parameters and restart apixaban.  Monitor vitals and on telemetry    DM type 2 (diabetes mellitus, type 2) ([facility])- (present on admission)  Assessment & Plan  Monitor accuchecks and cover with sliding scale insulin  Hemoglobin A1c 6.5% in past 4 months.  Diabetic diet.    Essential hypertension- (present on admission)  Assessment & Plan  Restarted lisinopril and coreg 2/12.  Amlodipine added  Monitor bmp and vitals.  Hold aldactone and torsemide for now (not volume overloaded at this time).    Acute cystitis without hematuria- (present  on admission)  Assessment & Plan  Rocephin  Ecoli  2/11 WBC:7.2  Purewick catheter as needed    Hypercalcemia  Assessment & Plan  Resolved  Monitor labs      CAD (coronary artery disease)- (present on admission)  Assessment & Plan  Restart Home BP meds if Cr allows.    2/12 overnight required prn hydralazine per RN.  Monitor vitals.       VTE prophylaxis: Heparin

## 2021-02-14 NOTE — CARE PLAN
Problem: Safety  Goal: Will remain free from falls  Outcome: PROGRESSING AS EXPECTED     Problem: Infection  Goal: Will remain free from infection  Outcome: PROGRESSING AS EXPECTED     Problem: Discharge Barriers/Planning  Goal: Patient's continuum of care needs will be met  Outcome: PROGRESSING AS EXPECTED

## 2021-02-14 NOTE — DISCHARGE PLANNING
Anticipated Discharge Disposition: TBD    Action: RN AMARILYS met with patient at bedside to discuss snf. Patient wanting this RN AMARILYS to call her son, Dank, to discuss.  YOLANDE PANDA called Dank who would like to discuss in person with his mom and CM or SW Monday when he visits.     Barriers to Discharge: snf choice, placement if pt and son agree    Plan: Case coordination to f/u with pt and family Monday to discuss snf

## 2021-02-15 LAB
ALBUMIN SERPL BCP-MCNC: 2.4 G/DL (ref 3.2–4.9)
ANION GAP SERPL CALC-SCNC: 10 MMOL/L (ref 7–16)
ANION GAP SERPL CALC-SCNC: 8 MMOL/L (ref 7–16)
BASOPHILS # BLD AUTO: 0.2 % (ref 0–1.8)
BASOPHILS # BLD: 0.02 K/UL (ref 0–0.12)
BUN SERPL-MCNC: 24 MG/DL (ref 8–22)
BUN SERPL-MCNC: 24 MG/DL (ref 8–22)
CALCIUM SERPL-MCNC: 8.6 MG/DL (ref 8.5–10.5)
CALCIUM SERPL-MCNC: 9 MG/DL (ref 8.5–10.5)
CHLORIDE SERPL-SCNC: 100 MMOL/L (ref 96–112)
CHLORIDE SERPL-SCNC: 96 MMOL/L (ref 96–112)
CO2 SERPL-SCNC: 18 MMOL/L (ref 20–33)
CO2 SERPL-SCNC: 21 MMOL/L (ref 20–33)
CREAT SERPL-MCNC: 0.92 MG/DL (ref 0.5–1.4)
CREAT SERPL-MCNC: 1.16 MG/DL (ref 0.5–1.4)
EKG IMPRESSION: NORMAL
EOSINOPHIL # BLD AUTO: 0.16 K/UL (ref 0–0.51)
EOSINOPHIL NFR BLD: 1.5 % (ref 0–6.9)
ERYTHROCYTE [DISTWIDTH] IN BLOOD BY AUTOMATED COUNT: 50.8 FL (ref 35.9–50)
GLUCOSE SERPL-MCNC: 139 MG/DL (ref 65–99)
GLUCOSE SERPL-MCNC: 307 MG/DL (ref 65–99)
HCT VFR BLD AUTO: 38.6 % (ref 37–47)
HGB BLD-MCNC: 12.4 G/DL (ref 12–16)
IMM GRANULOCYTES # BLD AUTO: 0.04 K/UL (ref 0–0.11)
IMM GRANULOCYTES NFR BLD AUTO: 0.4 % (ref 0–0.9)
LYMPHOCYTES # BLD AUTO: 1.16 K/UL (ref 1–4.8)
LYMPHOCYTES NFR BLD: 11 % (ref 22–41)
MAGNESIUM SERPL-MCNC: 1.7 MG/DL (ref 1.5–2.5)
MCH RBC QN AUTO: 31.6 PG (ref 27–33)
MCHC RBC AUTO-ENTMCNC: 32.1 G/DL (ref 33.6–35)
MCV RBC AUTO: 98.2 FL (ref 81.4–97.8)
MONOCYTES # BLD AUTO: 0.86 K/UL (ref 0–0.85)
MONOCYTES NFR BLD AUTO: 8.2 % (ref 0–13.4)
NEUTROPHILS # BLD AUTO: 8.27 K/UL (ref 2–7.15)
NEUTROPHILS NFR BLD: 78.7 % (ref 44–72)
NRBC # BLD AUTO: 0 K/UL
NRBC BLD-RTO: 0 /100 WBC
PHOSPHATE SERPL-MCNC: 2.9 MG/DL (ref 2.5–4.5)
PLATELET # BLD AUTO: 193 K/UL (ref 164–446)
PMV BLD AUTO: 11 FL (ref 9–12.9)
POTASSIUM SERPL-SCNC: 4.3 MMOL/L (ref 3.6–5.5)
POTASSIUM SERPL-SCNC: 4.9 MMOL/L (ref 3.6–5.5)
RBC # BLD AUTO: 3.93 M/UL (ref 4.2–5.4)
SODIUM SERPL-SCNC: 125 MMOL/L (ref 135–145)
SODIUM SERPL-SCNC: 128 MMOL/L (ref 135–145)
WBC # BLD AUTO: 10.5 K/UL (ref 4.8–10.8)

## 2021-02-15 PROCEDURE — 93005 ELECTROCARDIOGRAM TRACING: CPT | Performed by: HOSPITALIST

## 2021-02-15 PROCEDURE — 93010 ELECTROCARDIOGRAM REPORT: CPT | Performed by: INTERNAL MEDICINE

## 2021-02-15 PROCEDURE — 700111 HCHG RX REV CODE 636 W/ 250 OVERRIDE (IP): Performed by: HOSPITALIST

## 2021-02-15 PROCEDURE — 82040 ASSAY OF SERUM ALBUMIN: CPT

## 2021-02-15 PROCEDURE — 700111 HCHG RX REV CODE 636 W/ 250 OVERRIDE (IP)

## 2021-02-15 PROCEDURE — 85025 COMPLETE CBC W/AUTO DIFF WBC: CPT

## 2021-02-15 PROCEDURE — 84100 ASSAY OF PHOSPHORUS: CPT

## 2021-02-15 PROCEDURE — 700102 HCHG RX REV CODE 250 W/ 637 OVERRIDE(OP): Performed by: HOSPITALIST

## 2021-02-15 PROCEDURE — A9270 NON-COVERED ITEM OR SERVICE: HCPCS | Performed by: HOSPITALIST

## 2021-02-15 PROCEDURE — 99232 SBSQ HOSP IP/OBS MODERATE 35: CPT | Performed by: HOSPITALIST

## 2021-02-15 PROCEDURE — 80048 BASIC METABOLIC PNL TOTAL CA: CPT

## 2021-02-15 PROCEDURE — 83735 ASSAY OF MAGNESIUM: CPT

## 2021-02-15 PROCEDURE — 36415 COLL VENOUS BLD VENIPUNCTURE: CPT

## 2021-02-15 PROCEDURE — 770020 HCHG ROOM/CARE - TELE (206)

## 2021-02-15 RX ORDER — MAGNESIUM SULFATE HEPTAHYDRATE 40 MG/ML
2 INJECTION, SOLUTION INTRAVENOUS ONCE
Status: COMPLETED | OUTPATIENT
Start: 2021-02-15 | End: 2021-02-15

## 2021-02-15 RX ADMIN — CARVEDILOL 6.25 MG: 6.25 TABLET, FILM COATED ORAL at 07:55

## 2021-02-15 RX ADMIN — AMLODIPINE BESYLATE 5 MG: 5 TABLET ORAL at 05:23

## 2021-02-15 RX ADMIN — DIBASIC SODIUM PHOSPHATE, MONOBASIC POTASSIUM PHOSPHATE AND MONOBASIC SODIUM PHOSPHATE 250 MG: 852; 155; 130 TABLET ORAL at 00:13

## 2021-02-15 RX ADMIN — CARVEDILOL 6.25 MG: 6.25 TABLET, FILM COATED ORAL at 16:55

## 2021-02-15 RX ADMIN — ATROPINE SULFATE 0.5 MG: 0.1 INJECTION, SOLUTION INTRAVENOUS at 18:26

## 2021-02-15 RX ADMIN — APIXABAN 2.5 MG: 2.5 TABLET, FILM COATED ORAL at 05:23

## 2021-02-15 RX ADMIN — MAGNESIUM SULFATE IN WATER 2 G: 40 INJECTION, SOLUTION INTRAVENOUS at 07:56

## 2021-02-15 RX ADMIN — ATORVASTATIN CALCIUM 80 MG: 80 TABLET, FILM COATED ORAL at 16:55

## 2021-02-15 RX ADMIN — LISINOPRIL 20 MG: 20 TABLET ORAL at 07:55

## 2021-02-15 RX ADMIN — APIXABAN 2.5 MG: 2.5 TABLET, FILM COATED ORAL at 16:54

## 2021-02-15 ASSESSMENT — CHA2DS2 SCORE
SEX: FEMALE
AGE 65 TO 74: NO
CHA2DS2 VASC SCORE: 7
PRIOR STROKE OR TIA OR THROMBOEMBOLISM: NO
DIABETES: YES
AGE 75 OR GREATER: YES
CHF OR LEFT VENTRICULAR DYSFUNCTION: YES
HYPERTENSION: YES
VASCULAR DISEASE: YES

## 2021-02-15 ASSESSMENT — ENCOUNTER SYMPTOMS
HEADACHES: 0
BACK PAIN: 0
DIARRHEA: 0
COUGH: 0
STRIDOR: 0
NERVOUS/ANXIOUS: 0
FEVER: 0
DIZZINESS: 0
PALPITATIONS: 0
SHORTNESS OF BREATH: 0
NAUSEA: 0

## 2021-02-15 ASSESSMENT — PAIN DESCRIPTION - PAIN TYPE
TYPE: ACUTE PAIN

## 2021-02-15 NOTE — DIETARY
Nutrition Services: PO intake follow up.   · Per ADL, pt eating % of four out of seven past meals. Pt  reports only eating ~ 25% of meals but feels like food and snacks are too much, discussed tips for optimizing PO intake. Pt verbalized understanding and agreeable to supplements.   · Reviewed with MD, pt would benefit from Renal Multivitamin such as All Bee + C for pressure ulcer to sacrum.      Plan/Rec: Will try small meals with Boost Glucose supplements twice daily.  RD following for consistently adequate intake.

## 2021-02-15 NOTE — DISCHARGE PLANNING
Anticipated Discharge Disposition: SNF for rehab    Action: Met with pt and son at b/s to discuss choice for SNF options. Per choice son chose 1. [facility] 2. [facility] 3. [facility]     Educated pt and son on SNF stay and the process of applying for institutional medicaid.     Son asked for copy of choice form to look up facilities in the mean time while referrals are being sent.     Barriers to Discharge: Snf acceptance, medical clearance     Plan: LSW to assist as needed and monitor for barriers to discharge.

## 2021-02-15 NOTE — PROGRESS NOTES
Hospital Medicine Daily Progress Note    Date of Service  2/15/2021    Chief Complaint  Weakness and GLF    Hospital Course  85 y.o. female with CHF, CAD w/ CABG, HTN, PVD, DMII, afib admitted 2/9/2021 with acute renal failure and initial K:6.6 with Cr:3.67.  She required emergent hemodialysis. She was hypotensive in the ER and admitted to the ICU with possible Sepsis from a urinary source (ecoli).      Interval Problem Update  2/13: Awake sitting upright in bed eating breakfast. States she lives alone and has her son check in on her. She uses a walker and cane at home.  Has a purewick in place. Denies SOB. Required prn hydralazine overnight. Weak and ordered SNF.    2/14: Low Phos and replacing. Awake.  Await SNF placement.  Called and left message for her son Dank. Remains hypertensive and amlodipine added this am.    2/15: Awake.  States she feels too weak to get up for me.  States mild improvement of her breathing.    Consultants/Specialty  Nephrology  Intensivist (signing off 2/11)    Code Status  Full Code    Disposition  Await SNF.    Review of Systems  Review of Systems   Constitutional: Positive for malaise/fatigue. Negative for fever.   Respiratory: Negative for cough, shortness of breath and stridor.    Cardiovascular: Negative for chest pain, palpitations and leg swelling.   Gastrointestinal: Negative for diarrhea and nausea.   Genitourinary: Negative for dysuria.   Musculoskeletal: Negative for back pain and joint pain.   Neurological: Negative for dizziness and headaches.   Psychiatric/Behavioral: The patient is not nervous/anxious.         Physical Exam  Temp:  [36 °C (96.8 °F)-36.7 °C (98 °F)] 36.3 °C (97.4 °F)  Pulse:  [62-75] 67  Resp:  [15-18] 18  BP: (105-156)/(51-93) (P) 142/64  SpO2:  [90 %-98 %] 94 %    Physical Exam  Vitals reviewed.   Constitutional:       Appearance: She is not diaphoretic.      Comments: frail   HENT:      Head: Normocephalic and atraumatic.      Mouth/Throat:       Pharynx: No oropharyngeal exudate.      Comments: Dentures loosely adherent  Eyes:      General: No scleral icterus.        Right eye: No discharge.         Left eye: No discharge.      Extraocular Movements: Extraocular movements intact.      Conjunctiva/sclera: Conjunctivae normal.   Cardiovascular:      Rate and Rhythm: Normal rate and regular rhythm.      Pulses:           Radial pulses are 2+ on the right side and 2+ on the left side.        Dorsalis pedis pulses are 2+ on the right side and 2+ on the left side.      Heart sounds: Murmur present.   Pulmonary:      Effort: Pulmonary effort is normal. No respiratory distress.      Breath sounds: Normal breath sounds. No wheezing.   Abdominal:      General: Bowel sounds are normal.      Palpations: Abdomen is soft.      Tenderness: There is no abdominal tenderness.   Musculoskeletal:         General: No swelling or tenderness.      Cervical back: No muscular tenderness.      Right lower leg: No edema.      Left lower leg: No edema.   Skin:     Coloration: Skin is pale. Skin is not jaundiced.   Neurological:      General: No focal deficit present.      Mental Status: She is alert and oriented to person, place, and time. Mental status is at baseline.      Cranial Nerves: No cranial nerve deficit.   Psychiatric:         Mood and Affect: Mood normal.         Behavior: Behavior normal.         Fluids    Intake/Output Summary (Last 24 hours) at 2/15/2021 1659  Last data filed at 2/14/2021 1800  Gross per 24 hour   Intake 240 ml   Output --   Net 240 ml       Laboratory  Recent Labs     02/13/21  0506 02/14/21  0302 02/15/21  0359   WBC 9.3 9.5 10.5   RBC 3.73* 4.07* 3.93*   HEMOGLOBIN 11.8* 12.7 12.4   HEMATOCRIT 35.6* 38.4 38.6   MCV 95.4 94.3 98.2*   MCH 31.6 31.2 31.6   MCHC 33.1* 33.1* 32.1*   RDW 49.0 48.5 50.8*   PLATELETCT 208 217 193   MPV 10.4 10.5 11.0     Recent Labs     02/13/21  0506 02/14/21  0302 02/15/21  0359   SODIUM 136 132* 128*   POTASSIUM 4.3 4.8  4.3   CHLORIDE 108 105 100   CO2 19* 20 18*   GLUCOSE 131* 147* 139*   BUN 38* 26* 24*   CREATININE 0.91 0.89 0.92   CALCIUM 9.1 9.3 9.0                   Imaging  EC-ECHOCARDIOGRAM COMPLETE W/ CONT   Final Result      DX-CHEST-LIMITED (1 VIEW)   Final Result         1.  No acute cardiopulmonary disease.   2.  Cardiomegaly   3.  Atherosclerosis      DX-CHEST-PORTABLE (1 VIEW)   Final Result      No acute cardiopulmonary abnormality identified.           Assessment/Plan  * Acute renal failure superimposed on stage 3 chronic kidney disease (HCC)- (present on admission)  Assessment & Plan  Creatinine 3.67 with potassium 6.6 and peaked T waves on EKG.  Stop patient's  Torsemide 100 mg daily and spironolactone 25 mg daily.  IV fluids  2/11 BUN:94, Cr:1.57  2/12 check labs  Restart lisinopril for HFrEF 40-45% if Cr allows.  2/14 BUN:26, Cr:0.89  2/15 BUN:24, Cr:0.92    Hyperkalemia  Assessment & Plan  Elevated 6.6 on admission.  With peaked T waves.  Required hemodialysis  2/11 K:4.1  2/14 K:4.8    Chronic systolic heart failure (HCC)- (present on admission)  Assessment & Plan  History of congestive heart failure on diuretics.  Monitor while off diuretics and s/p sepsis loading of IV fluids.    Pulmonary hypertension (HCC)- (present on admission)  Assessment & Plan  History of pulmonary hypertension.  Supplemental oxygen    Chronic atrial fibrillation (HCC)- (present on admission)  Assessment & Plan  Restart coreg w/ parameters and restart apixaban.  Monitor vitals and on telemetry    DM type 2 (diabetes mellitus, type 2) ([facility])- (present on admission)  Assessment & Plan  Monitor accuchecks and cover with sliding scale insulin  Hemoglobin A1c 6.5% in past 4 months.  Diabetic diet.    Essential hypertension- (present on admission)  Assessment & Plan  Restarted lisinopril and coreg 2/12.  Amlodipine added  Monitor bmp and vitals.  Hold aldactone and torsemide for now (not volume overloaded at this time).    Acute cystitis  without hematuria- (present on admission)  Assessment & Plan  Rocephin  Ecoli  2/11 WBC:7.2  Purewick catheter as needed    Hypercalcemia  Assessment & Plan  Resolved  Monitor labs    CAD (coronary artery disease)- (present on admission)  Assessment & Plan  Restart Home BP meds if Cr allows.    2/12 overnight required prn hydralazine per RN.  Monitor vitals.       VTE prophylaxis: Heparin

## 2021-02-15 NOTE — CARE PLAN
Problem: Knowledge Deficit  Goal: Knowledge of the prescribed therapeutic regimen will improve  Outcome: PROGRESSING AS EXPECTED     Problem: Discharge Barriers/Planning  Goal: Patient's continuum of care needs will be met  Outcome: PROGRESSING AS EXPECTED     Problem: Skin Integrity  Goal: Risk for impaired skin integrity will decrease  Outcome: PROGRESSING AS EXPECTED

## 2021-02-15 NOTE — PROGRESS NOTES
Handoff report received from night shift nurse. Pt care assumed. Pt is currently resting in bed. POC discussed with Pt and Pt verbalizes no questions at this time. Pt is AAOx4, on Ra, on Tele monitoring, and VSS. Call light and belongings within reach, bed in lowest and locked position, and Pt educated on use of call light. Will continue to monitor.

## 2021-02-15 NOTE — PROGRESS NOTES
Received Bedside report. Assumed care at 1900. This pt is AOx4,  voiding via Purewick, 0/10 pain. Patient and RN discussed plan of care: questions answered. Labs noted, assessment complete, patient tolerating renal diet. Tele box in place. Pt is on room air. Call light in place, fall precautions in place, patient educated on importance of calling for assistance. No additional needs at this time. VSS

## 2021-02-15 NOTE — PROGRESS NOTES
Monitor Summary  SB-SR 59-68  (R-O) PAC, (O) PVC, (R) Coup, (R) Trig  2nd degree type 1  In and out of alternating BBB and accelerated idio.  .26/.08/.38

## 2021-02-15 NOTE — CARE PLAN
Problem: Nutritional:  Goal: Achieve adequate nutritional intake  Description: Patient will consume 75% of small meals and snacks to meet estimated needs.   Outcome: Not Met.

## 2021-02-15 NOTE — DISCHARGE PLANNING
Received Choice form at 2336  Agency/Facility Name: [facility], [facility]  Referral sent per Choice form @ [phone]

## 2021-02-16 PROBLEM — E83.52 HYPERCALCEMIA: Status: RESOLVED | Noted: 2021-02-09 | Resolved: 2021-02-16

## 2021-02-16 PROBLEM — R00.1 BRADYCARDIA: Status: ACTIVE | Noted: 2021-02-16

## 2021-02-16 PROBLEM — E87.5 HYPERKALEMIA: Status: RESOLVED | Noted: 2021-02-09 | Resolved: 2021-02-16

## 2021-02-16 LAB
ANION GAP SERPL CALC-SCNC: 8 MMOL/L (ref 7–16)
BASOPHILS # BLD AUTO: 0.3 % (ref 0–1.8)
BASOPHILS # BLD: 0.03 K/UL (ref 0–0.12)
BUN SERPL-MCNC: 27 MG/DL (ref 8–22)
CALCIUM SERPL-MCNC: 8.8 MG/DL (ref 8.5–10.5)
CHLORIDE SERPL-SCNC: 99 MMOL/L (ref 96–112)
CO2 SERPL-SCNC: 16 MMOL/L (ref 20–33)
CREAT SERPL-MCNC: 0.96 MG/DL (ref 0.5–1.4)
EOSINOPHIL # BLD AUTO: 0.23 K/UL (ref 0–0.51)
EOSINOPHIL NFR BLD: 2.3 % (ref 0–6.9)
ERYTHROCYTE [DISTWIDTH] IN BLOOD BY AUTOMATED COUNT: 48.7 FL (ref 35.9–50)
GLUCOSE SERPL-MCNC: 182 MG/DL (ref 65–99)
HCT VFR BLD AUTO: 34.6 % (ref 37–47)
HGB BLD-MCNC: 11.5 G/DL (ref 12–16)
IMM GRANULOCYTES # BLD AUTO: 0.04 K/UL (ref 0–0.11)
IMM GRANULOCYTES NFR BLD AUTO: 0.4 % (ref 0–0.9)
LYMPHOCYTES # BLD AUTO: 1.24 K/UL (ref 1–4.8)
LYMPHOCYTES NFR BLD: 12.2 % (ref 22–41)
MAGNESIUM SERPL-MCNC: 2.2 MG/DL (ref 1.5–2.5)
MCH RBC QN AUTO: 31.4 PG (ref 27–33)
MCHC RBC AUTO-ENTMCNC: 33.2 G/DL (ref 33.6–35)
MCV RBC AUTO: 94.5 FL (ref 81.4–97.8)
MONOCYTES # BLD AUTO: 0.83 K/UL (ref 0–0.85)
MONOCYTES NFR BLD AUTO: 8.2 % (ref 0–13.4)
NEUTROPHILS # BLD AUTO: 7.8 K/UL (ref 2–7.15)
NEUTROPHILS NFR BLD: 76.6 % (ref 44–72)
NRBC # BLD AUTO: 0 K/UL
NRBC BLD-RTO: 0 /100 WBC
PHOSPHATE SERPL-MCNC: 3.3 MG/DL (ref 2.5–4.5)
PLATELET # BLD AUTO: 244 K/UL (ref 164–446)
PMV BLD AUTO: 10.3 FL (ref 9–12.9)
POTASSIUM SERPL-SCNC: 4.9 MMOL/L (ref 3.6–5.5)
RBC # BLD AUTO: 3.66 M/UL (ref 4.2–5.4)
SODIUM SERPL-SCNC: 123 MMOL/L (ref 135–145)
WBC # BLD AUTO: 10.2 K/UL (ref 4.8–10.8)

## 2021-02-16 PROCEDURE — 84100 ASSAY OF PHOSPHORUS: CPT

## 2021-02-16 PROCEDURE — A9270 NON-COVERED ITEM OR SERVICE: HCPCS | Performed by: INTERNAL MEDICINE

## 2021-02-16 PROCEDURE — 770020 HCHG ROOM/CARE - TELE (206)

## 2021-02-16 PROCEDURE — 94760 N-INVAS EAR/PLS OXIMETRY 1: CPT

## 2021-02-16 PROCEDURE — A9270 NON-COVERED ITEM OR SERVICE: HCPCS | Performed by: HOSPITALIST

## 2021-02-16 PROCEDURE — 700102 HCHG RX REV CODE 250 W/ 637 OVERRIDE(OP): Performed by: INTERNAL MEDICINE

## 2021-02-16 PROCEDURE — 85025 COMPLETE CBC W/AUTO DIFF WBC: CPT

## 2021-02-16 PROCEDURE — 36415 COLL VENOUS BLD VENIPUNCTURE: CPT

## 2021-02-16 PROCEDURE — 83735 ASSAY OF MAGNESIUM: CPT

## 2021-02-16 PROCEDURE — 99223 1ST HOSP IP/OBS HIGH 75: CPT | Mod: 57 | Performed by: INTERNAL MEDICINE

## 2021-02-16 PROCEDURE — 99232 SBSQ HOSP IP/OBS MODERATE 35: CPT | Performed by: INTERNAL MEDICINE

## 2021-02-16 PROCEDURE — 700102 HCHG RX REV CODE 250 W/ 637 OVERRIDE(OP): Performed by: HOSPITALIST

## 2021-02-16 PROCEDURE — 80048 BASIC METABOLIC PNL TOTAL CA: CPT

## 2021-02-16 RX ORDER — LOPERAMIDE HYDROCHLORIDE 2 MG/1
2 CAPSULE ORAL 4 TIMES DAILY PRN
Status: DISCONTINUED | OUTPATIENT
Start: 2021-02-16 | End: 2021-02-18 | Stop reason: HOSPADM

## 2021-02-16 RX ADMIN — LOPERAMIDE HYDROCHLORIDE 2 MG: 2 CAPSULE ORAL at 18:41

## 2021-02-16 RX ADMIN — APIXABAN 2.5 MG: 2.5 TABLET, FILM COATED ORAL at 05:13

## 2021-02-16 RX ADMIN — LISINOPRIL 20 MG: 20 TABLET ORAL at 10:26

## 2021-02-16 RX ADMIN — DOCUSATE SODIUM 50 MG AND SENNOSIDES 8.6 MG 2 TABLET: 8.6; 5 TABLET, FILM COATED ORAL at 05:14

## 2021-02-16 RX ADMIN — AMLODIPINE BESYLATE 5 MG: 5 TABLET ORAL at 05:14

## 2021-02-16 RX ADMIN — ATORVASTATIN CALCIUM 80 MG: 80 TABLET, FILM COATED ORAL at 18:41

## 2021-02-16 ASSESSMENT — ENCOUNTER SYMPTOMS
HEADACHES: 0
DIZZINESS: 0
NERVOUS/ANXIOUS: 0
DIARRHEA: 0
NAUSEA: 1
BACK PAIN: 0
STRIDOR: 0
FEVER: 0
SHORTNESS OF BREATH: 0
COUGH: 0
PALPITATIONS: 0

## 2021-02-16 ASSESSMENT — PAIN DESCRIPTION - PAIN TYPE: TYPE: ACUTE PAIN

## 2021-02-16 ASSESSMENT — FIBROSIS 4 INDEX: FIB4 SCORE: 1.47

## 2021-02-16 NOTE — PROGRESS NOTES
12 hr cc          Monitor Summary:  SR 64-70  With frequent PVC, I/O of accelerated idioventricular  Small runs of second degree type 1 HB  With first degree HB  .36/.08/.40    Converted to Aflutter at 1657

## 2021-02-16 NOTE — PROGRESS NOTES
Report received from Mykel RN, assumed care of pt. Plan of care discussed with pt, labs and chart reviewed. All needs met at this time. Tele box on. Call light within reach, bed locked and in lowest position. All fall precautions and hourly rounding in place. Will continue to monitor.

## 2021-02-16 NOTE — CARE PLAN
Problem: Knowledge Deficit  Goal: Knowledge of disease process/condition, treatment plan, diagnostic tests, and medications will improve  Note: Updated on plan of care. Understanding of disease process and plan of care assessed and gaps in understanding addressed. Encouraged to ask questions and voice concerns.     Problem: Respiratory:  Goal: Respiratory status will improve  Note: Lung sounds, work of breathing, and oxygen saturation assessed and changes in assessment findings addressed.

## 2021-02-16 NOTE — CODE DOCUMENTATION
Page returned by Dr. Henderson. Orders for 0.5 mg atropine, stat EKG, cardiology consult placed. Pt on zoll with pads on.

## 2021-02-16 NOTE — DISCHARGE PLANNING
@[id number]  Agency/Facility Name: [facility]  Spoke To: Munira  Outcome: Will hopefully have beds tomorrow.    @[id number]  Agency/Facility Name: [facility]  Outcome: Left message inquiring about bed availability, awaiting call back

## 2021-02-16 NOTE — PROGRESS NOTES
Hospital Medicine Daily Progress Note    Date of Service  2/16/2021    Chief Complaint  Weakness and GLF    Hospital Course  85 y.o. female with CHF, CAD w/ CABG, HTN, PVD, DMII, afib admitted 2/9/2021 with acute renal failure and initial K:6.6 with Cr:3.67. She was hypotensive in the ER and admitted to the ICU with septic shock from UTI.  Patient was treated for her hyperkalemia with calcium chloride, insulin, and D50.  Patient's home blood pressure medication including torsemide and spironolactone were held.  Patient was slowly fluid resuscitated due to her chronic systolic heart failure.  Patient's hypotension improved and she was downgraded out of ICU.  Echocardiogram was done that showed EF 40 to 45% and grade 2 diastolic dysfunction with RVSP 45 mmHg. Patient has since finished antibiotics for UTI with no further symptoms.  Patient was evaluated by PT/OT who recommended postacute placement, and SNF referral placed.      Interval Problem Update  Per nursing patient became bradycardic yesterday requiring atropine, unknown heart rate at the time, and reportedly patient was asymptomatic.  Overnight patient went into atrial flutter and converted back into bradycardia in the 30s.  Per telemetry she had a 3.5-second pause, cardiology has been consulted.  This morning patient denies any dizziness, chest pain, or shortness of breath.  Patient did report that she was extremely nauseous earlier this morning.  Otherwise her only complaint is that she is tired.    Consultants/Specialty  Intensivist (signed off 2/11)  Cardiology     Code Status  Full Code    Disposition  Pending cardiology consult, eventually discharge to SNF    Review of Systems  Review of Systems   Constitutional: Positive for malaise/fatigue. Negative for fever.   Respiratory: Negative for cough, shortness of breath and stridor.    Cardiovascular: Negative for chest pain, palpitations and leg swelling.   Gastrointestinal: Positive for nausea. Negative  for diarrhea.   Genitourinary: Negative for dysuria.   Musculoskeletal: Negative for back pain and joint pain.   Neurological: Negative for dizziness and headaches.   Psychiatric/Behavioral: The patient is not nervous/anxious.         Physical Exam  Temp:  [36.3 °C (97.3 °F)-36.8 °C (98.2 °F)] 36.8 °C (98.2 °F)  Pulse:  [45-73] 53  Resp:  [14-19] 18  BP: ()/(42-67) 147/54  SpO2:  [93 %-100 %] 100 %    Physical Exam  Vitals reviewed.   Constitutional:       Appearance: She is not diaphoretic.      Comments: frail   HENT:      Head: Normocephalic and atraumatic.      Mouth/Throat:      Comments: Dentures loosely adherent  Eyes:      General: No scleral icterus.     Extraocular Movements: Extraocular movements intact.      Conjunctiva/sclera: Conjunctivae normal.   Cardiovascular:      Rate and Rhythm: Regular rhythm. Bradycardia present.      Heart sounds: Murmur present.   Pulmonary:      Effort: Pulmonary effort is normal. No respiratory distress.      Breath sounds: Normal breath sounds. No wheezing.   Abdominal:      General: Bowel sounds are normal. There is no distension.      Palpations: Abdomen is soft.      Tenderness: There is no abdominal tenderness.   Musculoskeletal:         General: No swelling or tenderness.      Cervical back: No muscular tenderness.      Right lower leg: No edema.      Left lower leg: No edema.   Skin:     General: Skin is warm and dry.      Coloration: Skin is pale. Skin is not jaundiced.   Neurological:      General: No focal deficit present.      Mental Status: She is alert and oriented to person, place, and time. Mental status is at baseline.      Cranial Nerves: No cranial nerve deficit.   Psychiatric:         Mood and Affect: Mood normal.         Behavior: Behavior normal.         Fluids  No intake or output data in the 24 hours ending 02/16/21 1538    Laboratory  Recent Labs     02/14/21  0302 02/15/21  0359 02/16/21  0530   WBC 9.5 10.5 10.2   RBC 4.07* 3.93* 3.66*    HEMOGLOBIN 12.7 12.4 11.5*   HEMATOCRIT 38.4 38.6 34.6*   MCV 94.3 98.2* 94.5   MCH 31.2 31.6 31.4   MCHC 33.1* 32.1* 33.2*   RDW 48.5 50.8* 48.7   PLATELETCT 217 193 244   MPV 10.5 11.0 10.3     Recent Labs     02/15/21  0359 02/15/21  1833 02/16/21  0343   SODIUM 128* 125* 123*   POTASSIUM 4.3 4.9 4.9   CHLORIDE 100 96 99   CO2 18* 21 16*   GLUCOSE 139* 307* 182*   BUN 24* 24* 27*   CREATININE 0.92 1.16 0.96   CALCIUM 9.0 8.6 8.8                   Imaging  EC-ECHOCARDIOGRAM COMPLETE W/ CONT   Final Result      DX-CHEST-LIMITED (1 VIEW)   Final Result         1.  No acute cardiopulmonary disease.   2.  Cardiomegaly   3.  Atherosclerosis      DX-CHEST-PORTABLE (1 VIEW)   Final Result      No acute cardiopulmonary abnormality identified.           Assessment/Plan  * Acute renal failure superimposed on stage 3 chronic kidney disease (HCC)- (present on admission)  Assessment & Plan  Creatinine 3.67 with potassium 6.6 and peaked T waves on EKG on admission   Stopped patient's Torsemide 100 mg daily and spironolactone 25 mg daily.  Improved after IV fluids  Restarted lisinopril       Bradycardia  Assessment & Plan  Intermittently in bradycardia down to low 30s  Per tele 3.5 sec pause   Cardiology consulted, appreciate recs     Chronic combined systolic and diastolic heart failure (HCC)- (present on admission)  Assessment & Plan  History of congestive heart failure on diuretics.  Restarted ACEi  Now EF 40-45%, no longer needs aldactone, will not restart  Appears euvoluemic, held toresemide    Pulmonary hypertension (HCC)- (present on admission)  Assessment & Plan  History of pulmonary hypertension.  Supplemental oxygen as needed    Chronic atrial fibrillation (HCC)- (present on admission)  Assessment & Plan  restart apixaban. Coreg held for bradycardia   Monitor vitals and on telemetry    DM type 2 (diabetes mellitus, type 2) ([facility])- (present on admission)  Assessment & Plan  Monitor accuchecks and cover with sliding  scale insulin  Hemoglobin A1c 6.5% in past 4 months.  Diabetic diet.    Essential hypertension- (present on admission)  Assessment & Plan  Restarted lisinopril  2/12.  Amlodipine added  Monitor bmp and vitals.  Hold aldactone and torsemide for now (not volume overloaded at this time).    Acute cystitis without hematuria- (present on admission)  Assessment & Plan  Finished Rocephin  Ecoli  2/11 WBC:7.2  Purewick catheter as needed    CAD (coronary artery disease)- (present on admission)  Assessment & Plan  Restarted lisinopril and amlodipine, held BB due to bradycardia   Monitor vitals.       VTE prophylaxis: Heparin

## 2021-02-16 NOTE — PROGRESS NOTES
Monitor room notified this RN that patient converted into A flutter. Notified Dr. Henderson at 0550, patient asymptomatic, VSS, and patient has history of Afib. Dr. Henderson wanted to continue to monitor patient for now.

## 2021-02-16 NOTE — PROGRESS NOTES
Received report from NOC RN. Assumed care of patient at 0700. Patient A&Ox 4, speaking in full sentences, follows commands and responds appropriately to questions. On 2L NC, no signs of respiratory distress. Respirations are even and unlabored. Patient states no pain at this time. POC discussed and agreed upon with patient. Call light and belongings within reach. Bed in lowest locked position. Upper side rails raised. Fall risk precautions in place. Hourly rounding. Will continue to monitor heart rate.

## 2021-02-16 NOTE — ASSESSMENT & PLAN NOTE
Intermittently in bradycardia down to low 30s  Per tele 3.5 sec pause   Cardiology consulted, s/p pacemaker

## 2021-02-17 ENCOUNTER — APPOINTMENT (OUTPATIENT)
Dept: CARDIOLOGY | Facility: MEDICAL CENTER | Age: 86
DRG: 853 | End: 2021-02-17
Attending: NURSE PRACTITIONER
Payer: MEDICARE

## 2021-02-17 ENCOUNTER — APPOINTMENT (OUTPATIENT)
Dept: RADIOLOGY | Facility: MEDICAL CENTER | Age: 86
DRG: 853 | End: 2021-02-17
Attending: INTERNAL MEDICINE
Payer: MEDICARE

## 2021-02-17 LAB
ANION GAP SERPL CALC-SCNC: 9 MMOL/L (ref 7–16)
BASOPHILS # BLD AUTO: 0.4 % (ref 0–1.8)
BASOPHILS # BLD: 0.04 K/UL (ref 0–0.12)
BUN SERPL-MCNC: 35 MG/DL (ref 8–22)
CALCIUM SERPL-MCNC: 9.1 MG/DL (ref 8.5–10.5)
CHLORIDE SERPL-SCNC: 97 MMOL/L (ref 96–112)
CO2 SERPL-SCNC: 20 MMOL/L (ref 20–33)
CREAT SERPL-MCNC: 0.97 MG/DL (ref 0.5–1.4)
EKG IMPRESSION: NORMAL
EKG IMPRESSION: NORMAL
EOSINOPHIL # BLD AUTO: 0.23 K/UL (ref 0–0.51)
EOSINOPHIL NFR BLD: 2.4 % (ref 0–6.9)
ERYTHROCYTE [DISTWIDTH] IN BLOOD BY AUTOMATED COUNT: 49.1 FL (ref 35.9–50)
GLUCOSE SERPL-MCNC: 154 MG/DL (ref 65–99)
HCT VFR BLD AUTO: 40.1 % (ref 37–47)
HGB BLD-MCNC: 13 G/DL (ref 12–16)
IMM GRANULOCYTES # BLD AUTO: 0.04 K/UL (ref 0–0.11)
IMM GRANULOCYTES NFR BLD AUTO: 0.4 % (ref 0–0.9)
INR PPP: 1 (ref 0.87–1.13)
LYMPHOCYTES # BLD AUTO: 1.01 K/UL (ref 1–4.8)
LYMPHOCYTES NFR BLD: 10.4 % (ref 22–41)
MAGNESIUM SERPL-MCNC: 2.1 MG/DL (ref 1.5–2.5)
MCH RBC QN AUTO: 31 PG (ref 27–33)
MCHC RBC AUTO-ENTMCNC: 32.4 G/DL (ref 33.6–35)
MCV RBC AUTO: 95.7 FL (ref 81.4–97.8)
MONOCYTES # BLD AUTO: 0.71 K/UL (ref 0–0.85)
MONOCYTES NFR BLD AUTO: 7.3 % (ref 0–13.4)
NEUTROPHILS # BLD AUTO: 7.64 K/UL (ref 2–7.15)
NEUTROPHILS NFR BLD: 79.1 % (ref 44–72)
NRBC # BLD AUTO: 0 K/UL
NRBC BLD-RTO: 0 /100 WBC
PHOSPHATE SERPL-MCNC: 3.5 MG/DL (ref 2.5–4.5)
PLATELET # BLD AUTO: 257 K/UL (ref 164–446)
PMV BLD AUTO: 10.4 FL (ref 9–12.9)
POTASSIUM SERPL-SCNC: 5.3 MMOL/L (ref 3.6–5.5)
PROTHROMBIN TIME: 13.4 SEC (ref 12–14.6)
RBC # BLD AUTO: 4.19 M/UL (ref 4.2–5.4)
SODIUM SERPL-SCNC: 126 MMOL/L (ref 135–145)
WBC # BLD AUTO: 9.7 K/UL (ref 4.8–10.8)

## 2021-02-17 PROCEDURE — 99152 MOD SED SAME PHYS/QHP 5/>YRS: CPT | Performed by: INTERNAL MEDICINE

## 2021-02-17 PROCEDURE — 85610 PROTHROMBIN TIME: CPT

## 2021-02-17 PROCEDURE — 97530 THERAPEUTIC ACTIVITIES: CPT

## 2021-02-17 PROCEDURE — 85025 COMPLETE CBC W/AUTO DIFF WBC: CPT

## 2021-02-17 PROCEDURE — A9270 NON-COVERED ITEM OR SERVICE: HCPCS | Performed by: HOSPITALIST

## 2021-02-17 PROCEDURE — 36415 COLL VENOUS BLD VENIPUNCTURE: CPT

## 2021-02-17 PROCEDURE — 93010 ELECTROCARDIOGRAM REPORT: CPT | Mod: 76 | Performed by: INTERNAL MEDICINE

## 2021-02-17 PROCEDURE — 93010 ELECTROCARDIOGRAM REPORT: CPT | Performed by: INTERNAL MEDICINE

## 2021-02-17 PROCEDURE — 33208 INSRT HEART PM ATRIAL & VENT: CPT

## 2021-02-17 PROCEDURE — 93005 ELECTROCARDIOGRAM TRACING: CPT | Performed by: INTERNAL MEDICINE

## 2021-02-17 PROCEDURE — 84100 ASSAY OF PHOSPHORUS: CPT

## 2021-02-17 PROCEDURE — 97116 GAIT TRAINING THERAPY: CPT

## 2021-02-17 PROCEDURE — 0JH606Z INSERTION OF PACEMAKER, DUAL CHAMBER INTO CHEST SUBCUTANEOUS TISSUE AND FASCIA, OPEN APPROACH: ICD-10-PCS | Performed by: INTERNAL MEDICINE

## 2021-02-17 PROCEDURE — 99232 SBSQ HOSP IP/OBS MODERATE 35: CPT | Performed by: INTERNAL MEDICINE

## 2021-02-17 PROCEDURE — 700102 HCHG RX REV CODE 250 W/ 637 OVERRIDE(OP): Performed by: HOSPITALIST

## 2021-02-17 PROCEDURE — 770020 HCHG ROOM/CARE - TELE (206)

## 2021-02-17 PROCEDURE — 700101 HCHG RX REV CODE 250

## 2021-02-17 PROCEDURE — 02H63JZ INSERTION OF PACEMAKER LEAD INTO RIGHT ATRIUM, PERCUTANEOUS APPROACH: ICD-10-PCS | Performed by: INTERNAL MEDICINE

## 2021-02-17 PROCEDURE — 700111 HCHG RX REV CODE 636 W/ 250 OVERRIDE (IP): Performed by: INTERNAL MEDICINE

## 2021-02-17 PROCEDURE — 80048 BASIC METABOLIC PNL TOTAL CA: CPT

## 2021-02-17 PROCEDURE — 02HK3JZ INSERTION OF PACEMAKER LEAD INTO RIGHT VENTRICLE, PERCUTANEOUS APPROACH: ICD-10-PCS | Performed by: INTERNAL MEDICINE

## 2021-02-17 PROCEDURE — 700117 HCHG RX CONTRAST REV CODE 255: Performed by: INTERNAL MEDICINE

## 2021-02-17 PROCEDURE — 93005 ELECTROCARDIOGRAM TRACING: CPT | Performed by: NURSE PRACTITIONER

## 2021-02-17 PROCEDURE — 71045 X-RAY EXAM CHEST 1 VIEW: CPT

## 2021-02-17 PROCEDURE — 700111 HCHG RX REV CODE 636 W/ 250 OVERRIDE (IP)

## 2021-02-17 PROCEDURE — 33208 INSRT HEART PM ATRIAL & VENT: CPT | Mod: KX | Performed by: INTERNAL MEDICINE

## 2021-02-17 PROCEDURE — 83735 ASSAY OF MAGNESIUM: CPT

## 2021-02-17 RX ORDER — CEFAZOLIN SODIUM 1 G/3ML
INJECTION, POWDER, FOR SOLUTION INTRAMUSCULAR; INTRAVENOUS
Status: COMPLETED
Start: 2021-02-17 | End: 2021-02-17

## 2021-02-17 RX ORDER — CEFAZOLIN SODIUM 2 G/100ML
2 INJECTION, SOLUTION INTRAVENOUS EVERY 8 HOURS
Status: COMPLETED | OUTPATIENT
Start: 2021-02-17 | End: 2021-02-18

## 2021-02-17 RX ORDER — BUPIVACAINE HYDROCHLORIDE 2.5 MG/ML
INJECTION, SOLUTION EPIDURAL; INFILTRATION; INTRACAUDAL
Status: COMPLETED
Start: 2021-02-17 | End: 2021-02-17

## 2021-02-17 RX ORDER — LIDOCAINE HYDROCHLORIDE 20 MG/ML
INJECTION, SOLUTION INFILTRATION; PERINEURAL
Status: COMPLETED
Start: 2021-02-17 | End: 2021-02-17

## 2021-02-17 RX ORDER — MIDAZOLAM HYDROCHLORIDE 1 MG/ML
INJECTION INTRAMUSCULAR; INTRAVENOUS
Status: COMPLETED
Start: 2021-02-17 | End: 2021-02-17

## 2021-02-17 RX ADMIN — CEFAZOLIN 2000 MG: 330 INJECTION, POWDER, FOR SOLUTION INTRAMUSCULAR; INTRAVENOUS at 11:23

## 2021-02-17 RX ADMIN — AMLODIPINE BESYLATE 5 MG: 5 TABLET ORAL at 04:34

## 2021-02-17 RX ADMIN — DOCUSATE SODIUM 50 MG AND SENNOSIDES 8.6 MG 2 TABLET: 8.6; 5 TABLET, FILM COATED ORAL at 04:34

## 2021-02-17 RX ADMIN — ATORVASTATIN CALCIUM 80 MG: 80 TABLET, FILM COATED ORAL at 18:37

## 2021-02-17 RX ADMIN — MIDAZOLAM HYDROCHLORIDE 0.5 MG: 1 INJECTION, SOLUTION INTRAMUSCULAR; INTRAVENOUS at 11:28

## 2021-02-17 RX ADMIN — FENTANYL CITRATE 25 MCG: 50 INJECTION, SOLUTION INTRAMUSCULAR; INTRAVENOUS at 11:28

## 2021-02-17 RX ADMIN — BUPIVACAINE HYDROCHLORIDE: 2.5 INJECTION, SOLUTION EPIDURAL; INFILTRATION; INTRACAUDAL; PERINEURAL at 11:22

## 2021-02-17 RX ADMIN — LISINOPRIL 20 MG: 20 TABLET ORAL at 09:43

## 2021-02-17 RX ADMIN — CEFAZOLIN SODIUM 2 G: 2 INJECTION, SOLUTION INTRAVENOUS at 19:32

## 2021-02-17 RX ADMIN — CEFAZOLIN 1000 MG: 330 INJECTION, POWDER, FOR SOLUTION INTRAMUSCULAR; INTRAVENOUS at 11:22

## 2021-02-17 RX ADMIN — IOHEXOL 10 ML: 350 INJECTION, SOLUTION INTRAVENOUS at 11:27

## 2021-02-17 RX ADMIN — LIDOCAINE HYDROCHLORIDE: 20 INJECTION, SOLUTION INFILTRATION; PERINEURAL at 11:22

## 2021-02-17 ASSESSMENT — COGNITIVE AND FUNCTIONAL STATUS - GENERAL
CLIMB 3 TO 5 STEPS WITH RAILING: A LOT
WALKING IN HOSPITAL ROOM: A LITTLE
HELP NEEDED FOR BATHING: A LOT
SUGGESTED CMS G CODE MODIFIER DAILY ACTIVITY: CK
STANDING UP FROM CHAIR USING ARMS: A LITTLE
DRESSING REGULAR UPPER BODY CLOTHING: A LITTLE
PERSONAL GROOMING: A LITTLE
MOVING TO AND FROM BED TO CHAIR: UNABLE
TURNING FROM BACK TO SIDE WHILE IN FLAT BAD: A LITTLE
DAILY ACTIVITIY SCORE: 16
SUGGESTED CMS G CODE MODIFIER MOBILITY: CK
TOILETING: A LOT
DRESSING REGULAR LOWER BODY CLOTHING: A LOT
MOBILITY SCORE: 16

## 2021-02-17 ASSESSMENT — PAIN DESCRIPTION - PAIN TYPE
TYPE: ACUTE PAIN

## 2021-02-17 ASSESSMENT — FIBROSIS 4 INDEX: FIB4 SCORE: 1.39

## 2021-02-17 ASSESSMENT — ENCOUNTER SYMPTOMS
DIARRHEA: 0
STRIDOR: 0
COUGH: 0
BACK PAIN: 0
DIZZINESS: 0
FEVER: 0
SHORTNESS OF BREATH: 0
NAUSEA: 0
HEADACHES: 0
PALPITATIONS: 0
NERVOUS/ANXIOUS: 0

## 2021-02-17 ASSESSMENT — GAIT ASSESSMENTS
ASSISTIVE DEVICE: FRONT WHEEL WALKER
DEVIATION: BRADYKINETIC
DISTANCE (FEET): 20
GAIT LEVEL OF ASSIST: SUPERVISED

## 2021-02-17 NOTE — CONSULTS
Cardiology Consult Note:    Dylan Ruiz M.D.  Date & Time note created:    2/16/2021   4:01 PM     Referring MD:  Dr. Vera    Patient ID:   Name:             Ana Torres   YOB: 1935  Age:                 85 y.o.  female   MRN:               [id number]                                                             Reason for Consult:      bradycardia    History of Present Illness:    85-year-old female with a past medical history of atrial fibrillation managed as an outpatient.  She was in normal state of health when she is admitted for acute renal failure as well as bradycardia.  She underwent emergent dialysis for her hyperkalemia.  She did complain of weakness 3 days prior to arriving to the hospital.  Now that she is back on the floor her creatinine is normalized however she is small with low body habitus is therefore likely has some degree of chronic kidney disease however she has been bradycardic with a heart rate in the 40s and during the day has had pauses up to 3.5 seconds in duration.  She has lost some weight over the last couple months.  She otherwise denies chest pain or pressure.    Review of Systems:      Constitutional: Denies fevers, Denies weight changes  Eyes: Denies changes in vision, no eye pain  Ears/Nose/Throat/Mouth: Denies nasal congestion or sore throat   Cardiovascular: no chest pain, no palpitations   Respiratory: no shortness of breath , Denies cough  Gastrointestinal/Hepatic: Denies abdominal pain, nausea, vomiting, diarrhea, constipation or GI bleeding   Genitourinary: Denies dysuria or frequency  Musculoskeletal/Rheum: Denies  joint pain and swelling, noedema  Skin: Denies rash  Neurological: Denies headache, confusion, memory loss or focal weakness/parasthesias  Psychiatric: denies mood disorder   Endocrine: Angely thyroid problems  Heme/Oncology/Lymph Nodes: Denies enlarged lymph nodes, denies brusing or known bleeding disorder  All other systems were  "reviewed and are negative (AMA/CMS criteria)                Past Medical History:   Past Medical History:   Diagnosis Date   • CAD (coronary artery disease)    • CATARACT    • CHF (congestive heart failure) ([facility])    • Cystocele    • Diabetes     insulin   • DJD (degenerative joint disease)    • HTN (hypertension), benign    • Hyperlipidemia    • Hypertension    • Medical home    • Myocardial infarct ([facility]) 2000   • PVD (peripheral vascular disease) ([facility])    • Renal disorder     \"watching my kidneys\"     • Unspecified urinary incontinence    • Urinary bladder disorder      Active Hospital Problems    Diagnosis    • Bradycardia [R00.1]      Priority: High   • Chronic combined systolic and diastolic heart failure (HCC) [I50.42]      Priority: High   • Acute renal failure superimposed on stage 3 chronic kidney disease (HCC) [N17.9, N18.30]      Priority: High   • Nonrheumatic aortic valve stenosis [I35.0]      Priority: Medium   • Pulmonary hypertension ([facility]) [I27.20]      Priority: Medium   • Chronic atrial fibrillation ([facility]) [I48.20]      Priority: Medium   • DM type 2 (diabetes mellitus, type 2) ([facility]) [E11.9]      Priority: Medium   • Essential hypertension [I10]      Priority: Medium   • Acute cystitis without hematuria [N30.00]      Priority: Medium   • Syncope [R55]      Priority: Low   • CAD (coronary artery disease) [I25.10]      Priority: Low       Past Surgical History:  Past Surgical History:   Procedure Laterality Date   • CATARACT PHACO WITH IOL  3/12/2014    Performed by Murtaza Casey M.D. at SURGERY SAME DAY [facility] ORS   • MULTIPLE CORONARY ARTERY BYPASS  2004   • ABDOMINAL HYSTERECTOMY TOTAL         Hospital Medications:  Current Facility-Administered Medications   Medication Dose   • amLODIPine (NORVASC) tablet 5 mg  5 mg   • lisinopril (PRINIVIL) tablet 20 mg  20 mg   • atorvastatin (LIPITOR) tablet 80 mg  80 mg   • apixaban (ELIQUIS) tablet 2.5 mg  2.5 mg   • hydrALAZINE (APRESOLINE) injection 10 " mg  10 mg   • senna-docusate (PERICOLACE or SENOKOT S) 8.6-50 MG per tablet 2 Tab  2 tablet    And   • polyethylene glycol/lytes (MIRALAX) PACKET 1 Packet  1 Packet    And   • magnesium hydroxide (MILK OF MAGNESIA) suspension 30 mL  30 mL    And   • bisacodyl (DULCOLAX) suppository 10 mg  10 mg   • acetaminophen (Tylenol) tablet 650 mg  650 mg         Current Outpatient Medications:  Medications Prior to Admission   Medication Sig Dispense Refill Last Dose   • spironolactone (ALDACTONE) 25 MG Tab Take 1 Tab by mouth every day. 30 Tab 11 2/9/2021 at 0830   • torsemide (DEMADEX) 100 MG Tab Take 1 Tab by mouth every day. 30 Tab 3 2/9/2021 at 0830   • atorvastatin (LIPITOR) 80 MG tablet Take 1 Tab by mouth every evening. 100 Tab 3 2/8/2021 at 1930   • apixaban (ELIQUIS) 2.5mg Tab Take 1 Tab by mouth 2 Times a Day. 200 Tab 3 2/9/2021 at 0830   • lisinopril (PRINIVIL) 20 MG Tab Take 1 Tab by mouth every day. 90 Tab 3 2/9/2021 at 0830   • carvedilol (COREG) 6.25 MG Tab TAKE ONE TABLET BY MOUTH TWICE A DAY WITH MEALS 200 Tab 3 2/9/2021 at 0830       Medication Allergy:  Allergies   Allergen Reactions   • Tetracaine Hcl [Tetcaine]    • Vancomycin    • Vigamox [Moxifloxacin]        Family History:  Family History   Problem Relation Age of Onset   • Hypertension Mother    • Heart Disease Mother    • No Known Problems Maternal Grandmother    • No Known Problems Maternal Grandfather    • Diabetes Daughter    • Seizures Son    • No Known Problems Son    • Heart Disease Son        Social History:  Social History     Socioeconomic History   • Marital status:      Spouse name: Not on file   • Number of children: Not on file   • Years of education: Not on file   • Highest education level: Not on file   Occupational History   • Not on file   Tobacco Use   • Smoking status: Never Smoker   • Smokeless tobacco: Never Used   Substance and Sexual Activity   • Alcohol use: No   • Drug use: No   • Sexual activity: Never     Partners:  "Male   Other Topics Concern   • Not on file   Social History Narrative   • Not on file     Social Determinants of Health     Financial Resource Strain: Low Risk    • Difficulty of Paying Living Expenses: Not hard at all   Food Insecurity:    • Worried About Running Out of Food in the Last Year:    • Ran Out of Food in the Last Year:    Transportation Needs: No Transportation Needs   • Lack of Transportation (Medical): No   • Lack of Transportation (Non-Medical): No   Physical Activity:    • Days of Exercise per Week:    • Minutes of Exercise per Session:    Stress:    • Feeling of Stress :    Social Connections:    • Frequency of Communication with Friends and Family:    • Frequency of Social Gatherings with Friends and Family:    • Attends Confucianist Services:    • Active Member of Clubs or Organizations:    • Attends Club or Organization Meetings:    • Marital Status:    Intimate Partner Violence:    • Fear of Current or Ex-Partner:    • Emotionally Abused:    • Physically Abused:    • Sexually Abused:          Physical Exam:  Vitals/ General Appearance:   Weight/BMI: Body mass index is 22.49 kg/m².  /54   Pulse (!) 53   Temp 36.8 °C (98.2 °F) (Temporal)   Resp 18   Ht 1.499 m (4' 11\")   Wt 50.5 kg (111 lb 5.3 oz)   SpO2 100%   Vitals:    02/16/21 0757 02/16/21 0911 02/16/21 1026 02/16/21 1107   BP: 122/53  121/48 147/54   Pulse: (!) 53 (!) 51 (!) 45 (!) 53   Resp: 16 18  18   Temp: 36.3 °C (97.3 °F)   36.8 °C (98.2 °F)   TempSrc: Temporal   Temporal   SpO2: 98% 98%  100%   Weight:       Height:         Oxygen Therapy:  Pulse Oximetry: 100 %, O2 (LPM): 2, FiO2%: 28 %, O2 Delivery Device: Silicone Nasal Cannula    Constitutional:   Well developed, Well nourished, No acute distress  HENMT:  Normocephalic, Atraumatic, Oropharynx moist mucous membranes, No oral exudates, Nose normal.  No thyromegaly.  Eyes:  EOMI, Conjunctiva normal, No discharge.  Neck:  Normal range of motion, No cervical tenderness,  no " JVD.  Cardiovascular:  Normal heart rate, Normal rhythm, No murmurs, No rubs, No gallops.   Extremitites with intact distal pulses, no cyanosis, or edema.  Lungs:  Normal breath sounds, breath sounds clear to auscultation bilaterally,  no crackles, no wheezing.   Abdomen: Bowel sounds normal, Soft, No tenderness, No guarding, No rebound, No masses, No hepatosplenomegaly.  Skin: Warm, Dry, No erythema, No rash, no induration.  Neurologic: Alert & oriented x 3, No focal deficits noted, cranial nerves II through X are grossly intact.  Psychiatric: Affect normal, Judgment normal, Mood normal.      MDM (Data Review):     Records reviewed and summarized in current documentation    Lab Data Review:  Recent Results (from the past 24 hour(s))   EKG    Collection Time: 02/15/21  6:23 PM   Result Value Ref Range    Report       [facility]    Test Date:  2021-02-15  Pt Name:    MORRO PINZON            Department: [facility]  MRN:        [id number]                      Room:       T708  Gender:     Female                       Technician: PEP  :        [id number]                   Requested By:CRISTIAN RODRIGUEZ  Order #:    [id number]                    Reading MD: Cristian Hudson MD    Measurements  Intervals                                Axis  Rate:       72                           P:  ME:                                      QRS:        21  QRSD:       96                           T:          188  QT:         376  QTc:        412    Interpretive Statements  ATRIAL FIBRILLATION  CONSIDER ANTERIOR INFARCT  ABNORMAL T, CONSIDER ISCHEMIA, LATERAL LEADS  Compared to ECG 2021 11:29:49  T-wave abnormality now present  Sinus rhythm no longer present  Electronically Signed On 2- 20:23:12 PST by Cristian Hudson MD     Basic Metabolic Panel    Collection Time: 02/15/21  6:33 PM   Result Value Ref Range    Sodium 125 (L) 135 - 145 mmol/L    Potassium 4.9 3.6 - 5.5 mmol/L    Chloride 96 96 - 112 mmol/L    Co2 21 20 - 33 mmol/L     Glucose 307 (H) 65 - 99 mg/dL    Bun 24 (H) 8 - 22 mg/dL    Creatinine 1.16 0.50 - 1.40 mg/dL    Calcium 8.6 8.5 - 10.5 mg/dL    Anion Gap 8.0 7.0 - 16.0   ESTIMATED GFR    Collection Time: 02/15/21  6:33 PM   Result Value Ref Range    GFR If  54 (A) >60 mL/min/1.73 m 2    GFR If Non African American 45 (A) >60 mL/min/1.73 m 2   Basic Metabolic Panel    Collection Time: 02/16/21  3:43 AM   Result Value Ref Range    Sodium 123 (L) 135 - 145 mmol/L    Potassium 4.9 3.6 - 5.5 mmol/L    Chloride 99 96 - 112 mmol/L    Co2 16 (L) 20 - 33 mmol/L    Glucose 182 (H) 65 - 99 mg/dL    Bun 27 (H) 8 - 22 mg/dL    Creatinine 0.96 0.50 - 1.40 mg/dL    Calcium 8.8 8.5 - 10.5 mg/dL    Anion Gap 8.0 7.0 - 16.0   PHOSPHORUS    Collection Time: 02/16/21  3:43 AM   Result Value Ref Range    Phosphorus 3.3 2.5 - 4.5 mg/dL   MAGNESIUM    Collection Time: 02/16/21  3:43 AM   Result Value Ref Range    Magnesium 2.2 1.5 - 2.5 mg/dL   ESTIMATED GFR    Collection Time: 02/16/21  3:43 AM   Result Value Ref Range    GFR If African American >60 >60 mL/min/1.73 m 2    GFR If Non African American 55 (A) >60 mL/min/1.73 m 2   CBC WITH DIFFERENTIAL    Collection Time: 02/16/21  5:30 AM   Result Value Ref Range    WBC 10.2 4.8 - 10.8 K/uL    RBC 3.66 (L) 4.20 - 5.40 M/uL    Hemoglobin 11.5 (L) 12.0 - 16.0 g/dL    Hematocrit 34.6 (L) 37.0 - 47.0 %    MCV 94.5 81.4 - 97.8 fL    MCH 31.4 27.0 - 33.0 pg    MCHC 33.2 (L) 33.6 - 35.0 g/dL    RDW 48.7 35.9 - 50.0 fL    Platelet Count 244 164 - 446 K/uL    MPV 10.3 9.0 - 12.9 fL    Neutrophils-Polys 76.60 (H) 44.00 - 72.00 %    Lymphocytes 12.20 (L) 22.00 - 41.00 %    Monocytes 8.20 0.00 - 13.40 %    Eosinophils 2.30 0.00 - 6.90 %    Basophils 0.30 0.00 - 1.80 %    Immature Granulocytes 0.40 0.00 - 0.90 %    Nucleated RBC 0.00 /100 WBC    Neutrophils (Absolute) 7.80 (H) 2.00 - 7.15 K/uL    Lymphs (Absolute) 1.24 1.00 - 4.80 K/uL    Monos (Absolute) 0.83 0.00 - 0.85 K/uL    Eos (Absolute)  0.23 0.00 - 0.51 K/uL    Baso (Absolute) 0.03 0.00 - 0.12 K/uL    Immature Granulocytes (abs) 0.04 0.00 - 0.11 K/uL    NRBC (Absolute) 0.00 K/uL       Imaging/Procedures Review:    Chest Xray:  Reviewed    EKG:   Dated 2/15/2021 personally viewed interpret myself showing atrial fibrillation with slow ventricular response.    ECHO:  Dated 2/11/2021 personally viewed interpreted myself showing an EF of 40-5 5% with no LV thrombus n with a calcified aortic valve likely mild aortic stenosis.    MDM (Assessment and Plan):     Active Hospital Problems    Diagnosis    • Bradycardia [R00.1]      Priority: High   • Chronic combined systolic and diastolic heart failure (HCC) [I50.42]      Priority: High   • Acute renal failure superimposed on stage 3 chronic kidney disease (HCC) [N17.9, N18.30]      Priority: High   • Nonrheumatic aortic valve stenosis [I35.0]      Priority: Medium   • Pulmonary hypertension (HCC) [I27.20]      Priority: Medium   • Chronic atrial fibrillation (HCC) [I48.20]      Priority: Medium   • DM type 2 (diabetes mellitus, type 2) (HCC) [E11.9]      Priority: Medium   • Essential hypertension [I10]      Priority: Medium   • Acute cystitis without hematuria [N30.00]      Priority: Medium   • Syncope [R55]      Priority: Low   • CAD (coronary artery disease) [I25.10]      Priority: Low     85-year-old female with what appears to be sick sinus syndrome slow ventricular response and occasional pauses up to 3.5 seconds in duration of the day.  Should be in preoperative midnight for pacemaker placement tomorrow morning.  I discussed the case with the patient including risk benefits and alternatives of pacemaker placement.  She agrees and relates this to the pacemaker that her  had.  I will alert the EP service.  She will be n.p.o. after midnight for pacemaker in the morning.

## 2021-02-17 NOTE — PROGRESS NOTES
12 hour chart check    Have a great shift!    Monitor summary    aFib 51-58  rPVC & rCoup  2-2.5 sp  -/.12/-

## 2021-02-17 NOTE — THERAPY
Physical Therapy   Daily Treatment     Patient Name: Ana Torres  Age:  85 y.o., Sex:  female  Medical Record #: [id number]  Today's Date: 2/17/2021     Precautions: Fall Risk    Assessment    Pt reports owning what sounds like a w/c or could be a transfer chair or fww, however she reports that she does not utilize this device as a w/c, sitting for mobility.  She reports that she pushes it and ambulates behind it.  She demonstrated impovement today.  She is able to move to the eob w/ spv, w/ HOB elevated and use of siderail.  She is able to stand w/ spv and ambulate 20 ft w/ spv.  She does move slowly, but w/o loss of balance.  She was returned to bed as she is scheduled for a pacer today.    Plan    Continue current treatment plan.    DC Equipment Recommendations: Unable to determine at this time  Discharge Recommendations: Recommend post-acute placement for additional physical therapy services prior to discharge home        Objective       02/17/21 0934   Balance   Sitting Balance (Static) Fair   Sitting Balance (Dynamic) Fair   Standing Balance (Static) Fair -   Standing Balance (Dynamic) Fair -   Weight Shift Sitting Fair   Weight Shift Standing Fair   Skilled Intervention Verbal Cuing   Comments w/ fww   Gait Analysis   Gait Level Of Assist Supervised   Assistive Device Front Wheel Walker   Distance (Feet) 20   Deviation Bradykinetic   Skilled Intervention Verbal Cuing   Bed Mobility    Supine to Sit Minimal Assist   Sit to Supine Minimal Assist   Scooting Supervised   Rolling Minimum Assist to Lt.   Skilled Intervention Verbal Cuing;Tactile Cuing;Sequencing;Facilitation   Comments HOB elevated w/ use of side rail   Functional Mobility   Sit to Stand Supervised   Short Term Goals    Short Term Goal # 1 Pt will perform supine<>sit EOB w/o bed features spv in 6 visits to prepare for transfers.   Goal Outcome # 1 goal not met   Short Term Goal # 2 Pt will walk 50' using FWW spv in a moving environment for  household ambulation in 6 visits (if appropriate).   Goal Outcome # 2 Goal not met   Anticipated Discharge Equipment and Recommendations   DC Equipment Recommendations Unable to determine at this time   Discharge Recommendations Recommend post-acute placement for additional physical therapy services prior to discharge home

## 2021-02-17 NOTE — DISCHARGE PLANNING
@1550  Agency/Facility Name: [facility]  Spoke To: Madelaine   Outcome: Working on auth. Patient does not need [facility] due to able to transport sitting normally.    @1530  Agency/Facility Name: [facility]  Outcome: Left message for transport auth, awaiting call back.    @1415  Agency/Facility Name: [facility]  Spoke To: Munira  Outcome: They will have a bed tomorrow and they can take at 1100.    Agency/Facility Name: [facility]  Outcome: Left message inquiring bed availability tomorrow, awaiting call back.

## 2021-02-17 NOTE — CARE PLAN
Problem: Knowledge Deficit  Goal: Knowledge of the prescribed therapeutic regimen will improve  Note: Educated on importance of physical and occupational therapy. Understanding of importance of procedure assessed and gaps in understanding addressed.     Problem: Skin Integrity  Goal: Risk for impaired skin integrity will decrease  Note: Q2h turns in place. Skin assessed for signs of breakdown.

## 2021-02-17 NOTE — PROGRESS NOTES
Chief Complaint:   Chief Complaint   Patient presents with   • Weakness     Pt is complaining of increased weakness over the last three days. Pt states she has lost 10lbs over the last week. Pt usually ambulates on her own with a steady gait. Pt lives by herself and since becoming weak, patient has been having episodes of incontinence.    • GLF     Pt fell onto her knees today and used her med alert button. EMS found pt on her knees. Pt denies hitting her head. Pt taking eliquis.    Weakness; GFL     HPI  Anafide Torres is a 85 y.o. female admitted 02/09/2021 with weakness, GFL, and JOSE C with hyperkalemia in setting of UTI. Potassium normalized. History of atrial fibrillation on anticoagulation with Eliquis. Per patient, history of weakness, dizziness, and fatigue associated with weight loss in past 6 months. Upon admission she was found to have atrial fibrillation with SVR and pauses 2.1-3.5 seconds. Plan for permanent pacemaker implantation today.       Medical history significant for Atrial Fibrillation on anticoagulation with Eliquis, CHF, valvular heart disease, AS, CAD s/p CABG 2004, pulmonary HTN, PVD, DMT2, hx ?TIA.     No cardiac complaints at this time. AAOx4, discussed pacemaker with patient. Bradycardia may be contributing to some of her symptoms although likely multifactorial. Patient verbalized understanding and wishes for device.   Vital Signs/labs were reviewed and relatively unremarkable. Afebrile, BP stable.   K+ 5.3, sCr 0.97 TSH 2.33 (10/2/20).   CXR (2/9/21) negative  Per echo, LVEF 40%.   Telemetry monitor: AF 50-60s bpm, 2.1-3.5 sec pauses, asymptomatic at this time.   NPO since MN.    Patient is right handed.   COVID negative  OAC with Eliquis, held.   INR today 1.0    - Ambulates with walker, will need to use caution s/p device. PT/OT, recommended SNF. Attempted to call [facility].    - Recommend sleep apnea evaluation as outpatient if not already completed prior.   - The risk,  benefits, and alternatives to permanent pacemaker placement were discussed in great detail, specific risks mentioned including bleeding, infection, cardiac perforation with possible tamponade requiring pericardiocentesis or open heart surgery. In addition the possibility of lead dislodgment (2-3%), pneumothorax (3%), hemothorax were discussed. Also mentioned were the possibility of death, stroke, and myocardial infarction.   - The patient verbalized understanding of these potential complications and wishes to proceed with this procedure.      Thanks,      MARIIA Enriquez.   [facility]  [phone]

## 2021-02-17 NOTE — PROGRESS NOTES
Hospital Medicine Daily Progress Note    Date of Service  2/17/2021    Chief Complaint  Weakness and GLF    Hospital Course  85 y.o. female with CHF, CAD w/ CABG, HTN, PVD, DMII, afib admitted 2/9/2021 with acute renal failure and initial K:6.6 with Cr:3.67. She was hypotensive in the ER and admitted to the ICU with septic shock from UTI.  Patient was treated for her hyperkalemia with calcium chloride, insulin, and D50.  Patient's home blood pressure medication including torsemide and spironolactone were held.  Patient was slowly fluid resuscitated due to her chronic systolic heart failure.  Patient's hypotension improved and she was downgraded out of ICU.  Echocardiogram was done that showed EF 40 to 45% and grade 2 diastolic dysfunction with RVSP 45 mmHg. Patient has since finished antibiotics for UTI with no further symptoms.  Patient was evaluated by PT/OT who recommended postacute placement, and SNF referral placed.    Interval Problem Update  No acute events overnight.  Patient's only complaint is that she is hungry.  Pacemaker was placed this afternoon.    Consultants/Specialty  Intensivist (signed off 2/11)  Cardiology     Code Status  Full Code    Disposition  Pending discharge to SNF    Review of Systems  Review of Systems   Constitutional: Positive for malaise/fatigue. Negative for fever.   Respiratory: Negative for cough, shortness of breath and stridor.    Cardiovascular: Negative for chest pain, palpitations and leg swelling.   Gastrointestinal: Negative for diarrhea and nausea.   Genitourinary: Negative for dysuria.   Musculoskeletal: Negative for back pain and joint pain.   Neurological: Negative for dizziness and headaches.   Psychiatric/Behavioral: The patient is not nervous/anxious.         Physical Exam  Temp:  [36.1 °C (97 °F)-36.6 °C (97.9 °F)] 36.4 °C (97.5 °F)  Pulse:  [48-63] 60  Resp:  [10-23] 12  BP: (120-151)/(46-95) 125/58  SpO2:  [95 %-100 %] 100 %    Physical Exam  Vitals reviewed.    Constitutional:       General: She is not in acute distress.  HENT:      Head: Normocephalic and atraumatic.      Mouth/Throat:      Comments: dentures  Eyes:      General: No scleral icterus.     Extraocular Movements: Extraocular movements intact.      Conjunctiva/sclera: Conjunctivae normal.   Cardiovascular:      Rate and Rhythm: Regular rhythm. Bradycardia present.      Heart sounds: Murmur present.   Pulmonary:      Effort: Pulmonary effort is normal. No respiratory distress.      Breath sounds: Normal breath sounds. No wheezing.   Abdominal:      General: Bowel sounds are normal. There is no distension.      Palpations: Abdomen is soft.      Tenderness: There is no abdominal tenderness.   Musculoskeletal:         General: No swelling or tenderness.      Cervical back: No muscular tenderness.      Right lower leg: No edema.      Left lower leg: No edema.   Skin:     General: Skin is warm and dry.      Coloration: Skin is pale. Skin is not jaundiced.   Neurological:      General: No focal deficit present.      Mental Status: She is alert and oriented to person, place, and time. Mental status is at baseline.      Cranial Nerves: No cranial nerve deficit.   Psychiatric:         Mood and Affect: Mood normal.         Behavior: Behavior normal.         Fluids    Intake/Output Summary (Last 24 hours) at 2/17/2021 1548  Last data filed at 2/17/2021 1300  Gross per 24 hour   Intake 240 ml   Output 1000 ml   Net -760 ml       Laboratory  Recent Labs     02/15/21  0359 02/16/21  0530 02/17/21  0358   WBC 10.5 10.2 9.7   RBC 3.93* 3.66* 4.19*   HEMOGLOBIN 12.4 11.5* 13.0   HEMATOCRIT 38.6 34.6* 40.1   MCV 98.2* 94.5 95.7   MCH 31.6 31.4 31.0   MCHC 32.1* 33.2* 32.4*   RDW 50.8* 48.7 49.1   PLATELETCT 193 244 257   MPV 11.0 10.3 10.4     Recent Labs     02/15/21  1833 02/16/21  0343 02/17/21  0358   SODIUM 125* 123* 126*   POTASSIUM 4.9 4.9 5.3   CHLORIDE 96 99 97   CO2 21 16* 20   GLUCOSE 307* 182* 154*   BUN 24* 27*  35*   CREATININE 1.16 0.96 0.97   CALCIUM 8.6 8.8 9.1     Recent Labs     02/17/21  0909   INR 1.00               Imaging  DX-CHEST-PORTABLE (1 VIEW)   Final Result      1.  Placement of cardiac pacemaker      2.  No pneumothorax      3.  Chronic elevation the right hemidiaphragm      EC-ECHOCARDIOGRAM COMPLETE W/ CONT   Final Result      DX-CHEST-LIMITED (1 VIEW)   Final Result         1.  No acute cardiopulmonary disease.   2.  Cardiomegaly   3.  Atherosclerosis      DX-CHEST-PORTABLE (1 VIEW)   Final Result      No acute cardiopulmonary abnormality identified.      CL-PERMANENT PACEMAKER INSERTION    (Results Pending)   CL-PERMANENT PACEMAKER INSERTION    (Results Pending)        Assessment/Plan  * Acute renal failure superimposed on stage 3 chronic kidney disease (HCC)- (present on admission)  Assessment & Plan  Creatinine 3.67 with potassium 6.6 and peaked T waves on EKG on admission   Stopped patient's Torsemide 100 mg daily and spironolactone 25 mg daily.  Improved after IV fluids  Restarted lisinopril       Bradycardia  Assessment & Plan  Intermittently in bradycardia down to low 30s  Per tele 3.5 sec pause   Cardiology consulted, s/p pacemaker     Chronic combined systolic and diastolic heart failure (HCC)- (present on admission)  Assessment & Plan  History of congestive heart failure on diuretics.  Restarted ACEi  Now EF 40-45%, no longer needs aldactone, will not restart  Appears euvoluemic, held toresemide    Pulmonary hypertension ([facility])- (present on admission)  Assessment & Plan  History of pulmonary hypertension.  Supplemental oxygen as needed    Chronic atrial fibrillation ([facility])- (present on admission)  Assessment & Plan  restart apixaban. Coreg held for bradycardia   Monitor vitals and on telemetry    DM type 2 (diabetes mellitus, type 2) ([facility])- (present on admission)  Assessment & Plan  Monitor accuchecks and cover with sliding scale insulin  Hemoglobin A1c 6.5% in past 4 months.  Diabetic  diet.    Essential hypertension- (present on admission)  Assessment & Plan  Restarted lisinopril  2/12.  Amlodipine added  Monitor bmp and vitals.  Hold aldactone and torsemide for now (not volume overloaded at this time).    Acute cystitis without hematuria- (present on admission)  Assessment & Plan  Finished Rocephin  Ecoli  2/11 WBC:7.2  Purewick catheter as needed    CAD (coronary artery disease)- (present on admission)  Assessment & Plan  Restarted lisinopril and amlodipine, held BB due to bradycardia   Monitor vitals.       VTE prophylaxis: Heparin

## 2021-02-17 NOTE — DISCHARGE PLANNING
Anticipated Discharge Disposition: [facility] for SNF rehab     Action: Spoke with patients son to notify him about SNF accepting facilities.     [facility] does not have any available beds until next week per Jeanna.     Son chose [facility]. Son had questions about discharge and if patient is receiving a pace maker. LSW reminded him that he would get updates when patient is getting ready to discharge to [facility]    @[phone] facility can take pt tomorrow at 11 am. Pt will require [facility]. Filled out [facility] PCS form and faxed to MADELINE Humphrey. Notified MD of bed availability.     Barriers to Discharge: medical clearance, bed availability at [facility]     Plan: LSW to assist as needed and monitor for barriers to discharge.

## 2021-02-17 NOTE — PROGRESS NOTES
Received report from NOC RN. Assumed care of patient at 0700. Patient A&Ox 4, speaking in full sentences, follows commands and responds appropriately to questions. On 2L NC, no signs of respiratory distress. Respirations are even and unlabored. Patient states no pain at this time. POC discussed and agreed upon with patient. Call light and belongings within reach. Bed in lowest locked position. Upper side rails raised. Bed alarm on. Fall risk precautions in place. Hourly rounding. Will continue to monitor heart rate and rhythm.

## 2021-02-17 NOTE — CARE PLAN
Problem: Venous Thromboembolism (VTW)/Deep Vein Thrombosis (DVT) Prevention:  Goal: Patient will participate in Venous Thrombosis (VTE)/Deep Vein Thrombosis (DVT)Prevention Measures  2/16/2021 2138 by Radha Baez R.N.  Outcome: PROGRESSING AS EXPECTED  2/16/2021 2137 by Radha Baez R.N.  Outcome: PROGRESSING AS EXPECTED     Problem: Knowledge Deficit  Goal: Knowledge of disease process/condition, treatment plan, diagnostic tests, and medications will improve  2/16/2021 2138 by Radha Baez R.N.  Outcome: PROGRESSING AS EXPECTED  2/16/2021 2137 by Radha Baez R.N.  Outcome: PROGRESSING AS EXPECTED  Goal: Knowledge of the prescribed therapeutic regimen will improve  2/16/2021 2138 by Radha Baez R.N.  Outcome: PROGRESSING AS EXPECTED  2/16/2021 2137 by Radha Baez R.N.  Outcome: PROGRESSING AS EXPECTED     Problem: Fluid Volume:  Goal: Will maintain balanced intake and output  2/16/2021 2138 by Radha Baez R.N.  Outcome: PROGRESSING AS EXPECTED  2/16/2021 2137 by Radha Baez R.N.  Outcome: PROGRESSING AS EXPECTED     Problem: Pain Management  Goal: Pain level will decrease to patient's comfort goal  2/16/2021 2138 by Radha Baez R.N.  Outcome: PROGRESSING AS EXPECTED  2/16/2021 2137 by Radha Baez R.N.  Outcome: PROGRESSING AS EXPECTED     Problem: Safety  Goal: Will remain free from injury  Outcome: PROGRESSING AS EXPECTED  Goal: Will remain free from falls  Outcome: PROGRESSING AS EXPECTED  Note: Safety and fall education given. All fall precautions are in place with belongings at bedside table. Needs are tended to. Educated to use call light for assistance. Pt verbalized understanding.

## 2021-02-17 NOTE — THERAPY
"Occupational Therapy  Daily Treatment     Patient Name: Ana Torres  Age:  85 y.o., Sex:  female  Medical Record #: [id number]  Today's Date: 2/17/2021     Precautions  Precautions: (P) Fall Risk  Comments: (P) planned PPM 2/17    Assessment    Pt seen for follow up OT tx session, pt initially hesitant to participate due to surgery for PPM later in day but with education pt willing to participate despite hunger and lethargy from previous night. Pt making steady progress for mobility and ADLs but would continue to benefit from skilled therapy while admitted as well as recommend post-acute placement.    Plan    Continue current treatment plan.    DC Equipment Recommendations: (P) Unable to determine at this time  Discharge Recommendations: (P) Recommend post-acute placement for additional occupational therapy services prior to discharge home    Subjective    \"I don't know I am just so hungry waiting for this surgery\"     Objective       02/17/21 1008   Pain 0 - 10 Group   Therapist Pain Assessment Post Activity Pain Same as Prior to Activity;Nurse Notified   Non Verbal Descriptors   Non Verbal Scale  Calm   Cognition    Cognition / Consciousness WDL   Level of Consciousness Alert   Comments pleasant, cooperative, initially hesitant due to upcoming surgery   Active ROM Upper Body   Active ROM Upper Body  WDL   Strength Upper Body   Upper Body Strength  X   Gross Strength Generalized Weakness, Equal Bilaterally.    Sensation Upper Body   Upper Extremity Sensation  WDL   Other Treatments   Other Treatments Provided Educated on benefits of continued therapy despite upcoming surgery for PPM   Balance   Sitting Balance (Static) Fair   Sitting Balance (Dynamic) Fair   Standing Balance (Static) Fair   Standing Balance (Dynamic) Fair -   Weight Shift Sitting Fair   Weight Shift Standing Poor   Skilled Intervention Verbal Cuing   Comments w/ FWW   Bed Mobility    Supine to Sit Minimal Assist   Sit to Supine Minimal " Assist   Scooting Supervised   Comments HOB elevated   Activities of Daily Living   Upper Body Dressing Supervision   Lower Body Dressing Maximal Assist   Toileting   (NT-refused need)   Skilled Intervention Verbal Cuing   How much help from another person does the patient currently need...   Putting on and taking off regular lower body clothing? 2   Bathing (including washing, rinsing, and drying)? 2   Toileting, which includes using a toilet, bedpan, or urinal? 2   Putting on and taking off regular upper body clothing? 3   Taking care of personal grooming such as brushing teeth? 3   Eating meals? 4   6 Clicks Daily Activity Score 16   Functional Mobility   Sit to Stand Minimal Assist   Bed, Chair, Wheelchair Transfer Minimal Assist   Toilet Transfers Refused   Transfer Method Stand Step   Mobility bed mobility, ambulate in room, BTB   Skilled Intervention Verbal Cuing;Tactile Cuing   Comments w/ FWW   Activity Tolerance   Sitting Edge of Bed 10   Standing 5   Patient / Family Goals   Patient / Family Goal #1 To return home    Goal #1 Outcome Progressing as expected   Short Term Goals   Short Term Goal # 1 Pt will perform LB dressing with min a    Goal Outcome # 1 Progressing slower than expected   Short Term Goal # 2 Pt will perform functional t/f's with supervision   Goal Outcome # 2 Progressing as expected   Short Term Goal # 3 Pt will perform toileting task with min a   Goal Outcome # 3 Progressing slower than expected   Education Group   Education Provided Role of Occupational Therapist   Role of Occupational Therapist Patient Response Patient;Acceptance;Explanation;Verbal Demonstration   Interdisciplinary Plan of Care Collaboration   IDT Collaboration with  Nursing   Patient Position at End of Therapy In Bed;Bed Alarm On;Call Light within Reach;Tray Table within Reach;Phone within Reach   Collaboration Comments RN updated

## 2021-02-17 NOTE — OP REPORT
[facility] PROCEDURE NOTE    PROCEDURE PERFORMED: Permanent Pacemaker Implantation    DATE OF SERVICE: 2/17/2021    : Neida Calle MD    ASSISTANT: None    ANESTHESIA: Local and moderate sedation (start time 1119, stop time 1145, total dose given 0.5 mg IV versed, 25 mcg IV fentanyl)    EBL: 30 cc    SPECIMENS: None    INDICATION(S):  Sick sinus syndrome  PAF    DESCRIPTION OF PROCEDURE:  After informed written consent, the patient was brought to the electrophysiology lab in the fasting, unsedated state. The patient was prepped and draped in the usual sterile fashion. The procedure was performed under moderate sedation with local anesthetic. A left upper extremity venogram was performed, demonstrating a patent subclavian vein. A left infraclavicular incision was made with a scalpel and the pectoral device pocket was created using a combination of blunt dissection and electrocautery. The modified Seldinger technique was used to gain access to the left axillary vein. A peel-away hemostasis sheath was placed in the vein. Under fluoroscopic guidance, the pacemaker leads were introduced into the heart. The ventricular lead was advanced to the RVOT and then lowered into position at the RV apex. The atrial lead was positioned on R atrial appendage. The leads were tested and had satisfactory sensing and pacing parameters. High output ventricular pacing did not produce extracardiac stimulation. The leads were sutured to the underlying pectoral muscle with interrupted silk over a silastic suture sleeve. The device pocket was irrigated with antibiotic solution, inspected, and no bleeding was seen. The leads were connected to the pacemaker pulse generator and the device was inserted into the pocket. The wound was closed with three layers of absorbable sutures. Following recovery from sedation, the patient was transferred to a monitored bed in good condition.     IMPLANTED DEVICE INFORMATION:  Pulse generator is a  MDT model W3DR01  Serial # [id number]    LEAD INFORMATION:  1)Right atrial lead is a MDT model #5076-45, serial #[id number], P wave 0.5 millivolts (AF), pacing impedance 1425 Ohms.    2)Right ventricular lead is a MDT model #5076-52, serial #[id number], R wave 15.8 millivolts, threshold 1.0 Volts at 0.4 milliseconds, pacing impedance 1083 Ohms.    DEVICE PROGRAMMING:  DDDR 60 -120 ppm    FLUOROSCOPY TIME: 3.12    IMPRESSIONS:  1. Successful dual chamber pacemaker implantation    RECOMMENDATIONS:  1. Transfer to monitored bed  2. Chest x-ray  3. Device interrogation prior to hospital discharge  4. Followup in device clinic

## 2021-02-17 NOTE — DISCHARGE PLANNING
@[id number]  Agency/Facility Name: [facility]  Spoke To: Munira  Outcome: They will have a bed tomorrow and they can take at 1100.    Agency/Facility Name: [facility]  Outcome: Left message inquiring bed availability tomorrow, awaiting call back.

## 2021-02-17 NOTE — PROGRESS NOTES
Bedside report received and patient care assumed. Pt is resting in bed, A&O4, with no complaints of pain, and is on 2L nasal canula. Tele box on. All fall precautions are in place, belongings at bedside table.  Pt was updated on POC, no questions or concerns. Pt educated on use of call light for assistance. Will continue to monitor.

## 2021-02-18 ENCOUNTER — APPOINTMENT (OUTPATIENT)
Dept: RADIOLOGY | Facility: MEDICAL CENTER | Age: 86
DRG: 853 | End: 2021-02-18
Attending: NURSE PRACTITIONER
Payer: MEDICARE

## 2021-02-18 VITALS
HEART RATE: 61 BPM | HEIGHT: 59 IN | WEIGHT: 119.27 LBS | TEMPERATURE: 97.1 F | SYSTOLIC BLOOD PRESSURE: 137 MMHG | BODY MASS INDEX: 24.04 KG/M2 | OXYGEN SATURATION: 99 % | DIASTOLIC BLOOD PRESSURE: 54 MMHG | RESPIRATION RATE: 18 BRPM

## 2021-02-18 LAB
ANION GAP SERPL CALC-SCNC: 7 MMOL/L (ref 7–16)
BASOPHILS # BLD AUTO: 0.4 % (ref 0–1.8)
BASOPHILS # BLD: 0.04 K/UL (ref 0–0.12)
BUN SERPL-MCNC: 29 MG/DL (ref 8–22)
CALCIUM SERPL-MCNC: 8.9 MG/DL (ref 8.5–10.5)
CHLORIDE SERPL-SCNC: 99 MMOL/L (ref 96–112)
CO2 SERPL-SCNC: 20 MMOL/L (ref 20–33)
CREAT SERPL-MCNC: 0.78 MG/DL (ref 0.5–1.4)
EKG IMPRESSION: NORMAL
EOSINOPHIL # BLD AUTO: 0.19 K/UL (ref 0–0.51)
EOSINOPHIL NFR BLD: 2 % (ref 0–6.9)
ERYTHROCYTE [DISTWIDTH] IN BLOOD BY AUTOMATED COUNT: 49.1 FL (ref 35.9–50)
GLUCOSE SERPL-MCNC: 233 MG/DL (ref 65–99)
HCT VFR BLD AUTO: 37 % (ref 37–47)
HGB BLD-MCNC: 12 G/DL (ref 12–16)
IMM GRANULOCYTES # BLD AUTO: 0.04 K/UL (ref 0–0.11)
IMM GRANULOCYTES NFR BLD AUTO: 0.4 % (ref 0–0.9)
LYMPHOCYTES # BLD AUTO: 0.76 K/UL (ref 1–4.8)
LYMPHOCYTES NFR BLD: 8 % (ref 22–41)
MAGNESIUM SERPL-MCNC: 1.9 MG/DL (ref 1.5–2.5)
MCH RBC QN AUTO: 31.2 PG (ref 27–33)
MCHC RBC AUTO-ENTMCNC: 32.4 G/DL (ref 33.6–35)
MCV RBC AUTO: 96.1 FL (ref 81.4–97.8)
MONOCYTES # BLD AUTO: 0.74 K/UL (ref 0–0.85)
MONOCYTES NFR BLD AUTO: 7.8 % (ref 0–13.4)
NEUTROPHILS # BLD AUTO: 7.71 K/UL (ref 2–7.15)
NEUTROPHILS NFR BLD: 81.4 % (ref 44–72)
NRBC # BLD AUTO: 0 K/UL
NRBC BLD-RTO: 0 /100 WBC
PHOSPHATE SERPL-MCNC: 3.1 MG/DL (ref 2.5–4.5)
PLATELET # BLD AUTO: 272 K/UL (ref 164–446)
PMV BLD AUTO: 10.1 FL (ref 9–12.9)
POTASSIUM SERPL-SCNC: 5.3 MMOL/L (ref 3.6–5.5)
RBC # BLD AUTO: 3.85 M/UL (ref 4.2–5.4)
SODIUM SERPL-SCNC: 126 MMOL/L (ref 135–145)
WBC # BLD AUTO: 9.5 K/UL (ref 4.8–10.8)

## 2021-02-18 PROCEDURE — 93010 ELECTROCARDIOGRAM REPORT: CPT | Performed by: INTERNAL MEDICINE

## 2021-02-18 PROCEDURE — 700102 HCHG RX REV CODE 250 W/ 637 OVERRIDE(OP): Performed by: INTERNAL MEDICINE

## 2021-02-18 PROCEDURE — 700102 HCHG RX REV CODE 250 W/ 637 OVERRIDE(OP): Performed by: HOSPITALIST

## 2021-02-18 PROCEDURE — 80048 BASIC METABOLIC PNL TOTAL CA: CPT

## 2021-02-18 PROCEDURE — 99239 HOSP IP/OBS DSCHRG MGMT >30: CPT | Performed by: INTERNAL MEDICINE

## 2021-02-18 PROCEDURE — 84100 ASSAY OF PHOSPHORUS: CPT

## 2021-02-18 PROCEDURE — 83735 ASSAY OF MAGNESIUM: CPT

## 2021-02-18 PROCEDURE — 99024 POSTOP FOLLOW-UP VISIT: CPT | Performed by: NURSE PRACTITIONER

## 2021-02-18 PROCEDURE — 36415 COLL VENOUS BLD VENIPUNCTURE: CPT

## 2021-02-18 PROCEDURE — 85025 COMPLETE CBC W/AUTO DIFF WBC: CPT

## 2021-02-18 PROCEDURE — 93005 ELECTROCARDIOGRAM TRACING: CPT | Performed by: INTERNAL MEDICINE

## 2021-02-18 PROCEDURE — A9270 NON-COVERED ITEM OR SERVICE: HCPCS | Performed by: HOSPITALIST

## 2021-02-18 PROCEDURE — A9270 NON-COVERED ITEM OR SERVICE: HCPCS | Performed by: INTERNAL MEDICINE

## 2021-02-18 PROCEDURE — 71045 X-RAY EXAM CHEST 1 VIEW: CPT

## 2021-02-18 PROCEDURE — 700111 HCHG RX REV CODE 636 W/ 250 OVERRIDE (IP): Performed by: INTERNAL MEDICINE

## 2021-02-18 RX ORDER — CARVEDILOL 3.12 MG/1
3.12 TABLET ORAL 2 TIMES DAILY WITH MEALS
Qty: 60 TABLET | Refills: 0 | Status: SHIPPED
Start: 2021-02-18

## 2021-02-18 RX ORDER — CARVEDILOL 3.12 MG/1
3.12 TABLET ORAL 2 TIMES DAILY WITH MEALS
Status: DISCONTINUED | OUTPATIENT
Start: 2021-02-18 | End: 2021-02-18 | Stop reason: HOSPADM

## 2021-02-18 RX ORDER — AMLODIPINE BESYLATE 5 MG/1
5 TABLET ORAL DAILY
Qty: 30 TABLET | Refills: 0 | Status: SHIPPED
Start: 2021-02-19 | End: 2021-02-18

## 2021-02-18 RX ORDER — ACETAMINOPHEN 325 MG/1
650 TABLET ORAL EVERY 6 HOURS PRN
Qty: 30 TABLET | Refills: 0 | Status: SHIPPED
Start: 2021-02-18

## 2021-02-18 RX ADMIN — CARVEDILOL 3.12 MG: 3.12 TABLET, FILM COATED ORAL at 10:13

## 2021-02-18 RX ADMIN — AMLODIPINE BESYLATE 5 MG: 5 TABLET ORAL at 05:18

## 2021-02-18 RX ADMIN — CEFAZOLIN SODIUM 2 G: 2 INJECTION, SOLUTION INTRAVENOUS at 02:45

## 2021-02-18 RX ADMIN — DOCUSATE SODIUM 50 MG AND SENNOSIDES 8.6 MG 2 TABLET: 8.6; 5 TABLET, FILM COATED ORAL at 05:18

## 2021-02-18 RX ADMIN — LISINOPRIL 20 MG: 20 TABLET ORAL at 10:12

## 2021-02-18 ASSESSMENT — ENCOUNTER SYMPTOMS
WHEEZING: 0
SPEECH CHANGE: 0
FOCAL WEAKNESS: 0
ABDOMINAL PAIN: 0
LIGHT-HEADEDNESS: 0
BRUISES/BLEEDS EASILY: 0
COUGH: 0
HEARTBURN: 0
PALPITATIONS: 0
WEAKNESS: 0
FATIGUE: 0
SHORTNESS OF BREATH: 0
NAUSEA: 0
FEVER: 0
PND: 0
CHILLS: 0
NUMBNESS: 0
CLAUDICATION: 0
ORTHOPNEA: 0
DIZZINESS: 0
SPEECH DIFFICULTY: 0
VOMITING: 0
TROUBLE SWALLOWING: 0
WEIGHT LOSS: 1
BLOOD IN STOOL: 0

## 2021-02-18 ASSESSMENT — PAIN DESCRIPTION - PAIN TYPE
TYPE: ACUTE PAIN
TYPE: ACUTE PAIN

## 2021-02-18 NOTE — PROGRESS NOTES
Call attempt was made to give report to [facility] RN. Message was left, will attempt to reach RN again shortly.

## 2021-02-18 NOTE — NON-PROVIDER
[facility] Cardiology Follow-up Note    Date of Service:    2/18/2021      Name:   Ana Torres   YOB: 1935  Age:   85 y.o.  female   MRN:   [id number]      Chief Complaint: S/P PPM Placement    HPI:  This is an 85 year old lady who was admitted on 02/09/21 with a UTI and JOSE C, found to be hyperkalemic and received hemodialysis. The patient had reported several days of weakness as well as a ground level fall prior to coming to the hospital, and she noted some weight loss over the last several months. PMH significant for atrial fibrillation (anticoagulated on Eliquis prior to admission), CHF, valvular heart disease, CAD s/p CABG 2004, pulmonary hypertension, CKD, DM2, PVD and hypertension.    The patient was in a fib at time of admission. While in the hospital, the patient was noted to be bradycardic with HR in the 40s, with occasional pauses lasting 2.1-3.5 seconds. As such, cardiology was consulted and the patient was recommended for a pacemaker.    Interim Events:  Dual chamber PPM placement 02/17/21  CXR yesterday following the procedure and this morning show appropriate lead placement. No evidence of late pneumothorax, the patient does have a chronic finding of elevated right hemidiaphragm on chest x-ray. Pacemaker check this morning showed normal function of the device.    The patient notes some mild soreness over the device placement site but has not noticed any redness, swelling, or drainage. Currently she denies any chest pain or pressure, palpitations, shortness of breath, or leg swelling.    Plan is for patient to be discharged to SNF today.      ROS  Review of Systems   Constitutional: Positive for weight loss. Negative for chills and fever.   Respiratory: Negative for cough and shortness of breath.    Cardiovascular: Negative for chest pain, palpitations, orthopnea, claudication, leg swelling and PND.   Gastrointestinal: Negative for abdominal pain, heartburn, nausea and vomiting.  "  Genitourinary: Negative for dysuria and hematuria.   Neurological: Negative for dizziness, speech change and focal weakness.   Endo/Heme/Allergies: Does not bruise/bleed easily.       Past medical, surgical, social, and family history reviewed and unchanged from admission except as noted in HPI.    Medications: Reviewed in MAR  Current Facility-Administered Medications   Medication Dose Frequency Provider Last Rate Last Admin   • loperamide (IMODIUM) capsule 2 mg  2 mg 4X/DAY PRN PORTIA HopkinsOCarson   2 mg at 02/16/21 1841   • amLODIPine (NORVASC) tablet 5 mg  5 mg Q DAY PORTIA UptonO.   5 mg at 02/18/21 0518   • lisinopril (PRINIVIL) tablet 20 mg  20 mg QDAY JOE Upton.OCarson   20 mg at 02/17/21 0943   • atorvastatin (LIPITOR) tablet 80 mg  80 mg Q EVENING JOE Upton.O.   80 mg at 02/17/21 1837   • hydrALAZINE (APRESOLINE) injection 10 mg  10 mg Q4HRS PRN Nrainder Jean Baptiste M.D.   10 mg at 02/12/21 0531   • senna-docusate (PERICOLACE or SENOKOT S) 8.6-50 MG per tablet 2 Tab  2 tablet BID Tessa Soler M.D.   2 tablet at 02/18/21 0518    And   • polyethylene glycol/lytes (MIRALAX) PACKET 1 Packet  1 Packet QDAY PRN Tessa Soler M.D.        And   • magnesium hydroxide (MILK OF MAGNESIA) suspension 30 mL  30 mL QDAY PRN Tessa Soler M.D.        And   • bisacodyl (DULCOLAX) suppository 10 mg  10 mg QDAY PRN Tessa Soler M.D.       • acetaminophen (Tylenol) tablet 650 mg  650 mg Q6HRS PRN Tessa Soler M.D.       Last reviewed on 2/9/2021  5:26 PM by Molly Cheng    Allergies   Allergen Reactions   • Tetracaine Hcl [Tetcaine]    • Vancomycin    • Vigamox [Moxifloxacin]        Physical Exam  Body mass index is 24.09 kg/m². /61   Pulse 63   Temp 36.3 °C (97.4 °F) (Temporal)   Resp 16   Ht 1.499 m (4' 11\")   Wt 54.1 kg (119 lb 4.3 oz)   SpO2 90%    Vitals:    02/17/21 2025 02/17/21 2349 02/18/21 0450 02/18/21 0804   BP:  147/66 135/67 137/61   Pulse:  71 62 63   Resp:  16 17 16 "   Temp:  36.5 °C (97.7 °F) 36.3 °C (97.3 °F) 36.3 °C (97.4 °F)   TempSrc:  Temporal Temporal Temporal   SpO2:  94% 98% 90%   Weight: 54.1 kg (119 lb 4.3 oz)      Height:        Oxygen Therapy:  Pulse Oximetry: 90 %, O2 (LPM): 2, O2 Delivery Device: Nasal Cannula    Physical Exam   Constitutional: She is oriented to person, place, and time. No distress.   Chronically ill-appearing, small body habitus.   Eyes: EOM are normal. No scleral icterus.   Neck: No JVD present.   Cardiovascular: Normal rate, regular rhythm, S1 normal and S2 normal. Exam reveals no gallop.   No murmur heard.  Pulmonary/Chest: No accessory muscle usage. She has decreased breath sounds in the right lower field.   Pauses to take a breath after speaking 3-4 words (denies feeling short of breath)  Coarse breath sounds throughout.  Bandage over the PPM site, appears dry without erythema or swelling.   Musculoskeletal:      Cervical back: Normal range of motion and neck supple.   Neurological: She is alert and oriented to person, place, and time.   Skin: There is pallor.   Psychiatric: She has a normal mood and affect.       Labs (personally reviewed):     Lab Results   Component Value Date/Time    SODIUM 126 (L) 02/18/2021 05:04 AM    POTASSIUM 5.3 02/18/2021 05:04 AM    CHLORIDE 99 02/18/2021 05:04 AM    CO2 20 02/18/2021 05:04 AM    GLUCOSE 233 (H) 02/18/2021 05:04 AM    BUN 29 (H) 02/18/2021 05:04 AM    CREATININE 0.78 02/18/2021 05:04 AM     Lab Results   Component Value Date/Time    ALKPHOSPHAT 117 (H) 02/09/2021 03:20 PM    ASTSGOT 22 02/09/2021 03:20 PM    ALTSGPT 26 02/09/2021 03:20 PM    TBILIRUBIN 0.6 02/09/2021 03:20 PM      Lab Results   Component Value Date/Time    CHOLSTRLTOT 101 06/11/2019 01:28 PM    LDL 49 06/11/2019 01:28 PM    HDL 39 (A) 06/11/2019 01:28 PM    TRIGLYCERIDE 66 06/11/2019 01:28 PM         Cardiac Imaging and Procedures Review:      Telemetry Review: Paced rhythm, rate in the 60s    Echo 02/11/21:  CONCLUSIONS  Fair  quality study, improved with contrast.  Mild to moderately reduced left ventricular systolic function.  Left ventricular ejection fraction is visually estimated to be 40-45%.  Hypokinesis of the mid anterior septum and apex.  No LV thrombus.  Grade II diastolic dysfunction.  Moderately dilated left atrium.  Estimated right ventricular systolic pressure is 45 mmHg.     Compared to prior echo on 11/10/20, LV EF is improved, aortic stenosis   not appreciated, improved RVSP, less MR and TR, wall motion   abnormalities were probably present.    CXR 21:  Right lower lobe infiltrate and layering right pleural effusion, stable since prior study.  Cardiomegaly  There is chronic elevation of the right hemidiaphragm.  PPM and lead placement appear appropriate.      Assessment and Medical Decision Makin   Bradycardia, s/p PPM placement  -   PPM placed yesterday 21 by Dr. Calle  -   Device check today showing normal function  -   Pacer restrictions reviewed with patient. No showers until seen in follow up; may take sponge bath.  Keep dressing dry & in place until your follow up visit at the cardiology office. No lifting over 10 lbs with affected arm for six weeks. Do not raise affected arm above shoulder level or behind head for six weeks. No driving for the first week. Call our office if you notice any increased swelling, redness, warmth, or drainage at the implant site. May remove arm sling after one day, but please wear if you have trouble remembering to keep your arm down. Do not place cell phones or mobile devices directly over implanted device.   -   Patient will be discharged to skilled nursing facility today. Follow up appointment made for one week.    2   Atrial fibrillation  -   Chronic  -   Continue Eliquis and Coreg    3   HFrEF, LVEF 40 - 45%  -   Patient is on beta blocker, ACE-I, spironolactone, and torsemide at home  -   Has follow up appointment with HF clinic    5   Coronary artery disease  -    History of CABG in 2004  -   On high intensity statin    7   Pulmonary hypertension  -   History of pulmonary hypertension  -   On oxygen at home, typically 2 L/min    6   Hypertension  -   Patient initially hypotensive on admission, BP over the last couple of days has been in the 130s - 140s / 60s - 80s  -   Patient on lisinopril, amlodipine, and carvedilol for BP control        ROBIN Sheehan student

## 2021-02-18 NOTE — DISCHARGE PLANNING
@1200  Agency/Facility Name: [facility]  Outcome: Came and transport patient without being confirmed to set up transport or even a single call and no insurance auth.    @0936  Agency/Facility Name: [facility]  Spoke To: Munira  Outcome: Transport set for 1200.

## 2021-02-18 NOTE — DISCHARGE INSTRUCTIONS
Discharge Instructions    Discharged to other by medical transportation with escort. Discharged via ambulance, hospital escort: Yes.  Special equipment needed: Not Applicable    Be sure to schedule a follow-up appointment with your primary care doctor or any specialists as instructed.     Discharge Plan:   Diet Plan: Discussed  Activity Level: Discussed  Confirmed Follow up Appointment: No (Comments)(PT to skilled facility)  Confirmed Symptoms Management: Discussed  Medication Reconciliation Updated: Yes  Influenza Vaccine Indication: Not indicated: Previously immunized this influenza season and > 8 years of age    I understand that a diet low in cholesterol, fat, and sodium is recommended for good health. Unless I have been given specific instructions below for another diet, I accept this instruction as my diet prescription.   Other diet: Heart heathy    Special Instructions: None    · Is patient discharged on Warfarin / Coumadin?   No     Depression / Suicide Risk    As you are discharged from this [facility] facility, it is important to learn how to keep safe from harming yourself.    Recognize the warning signs:  · Abrupt changes in personality, positive or negative- including increase in energy   · Giving away possessions  · Change in eating patterns- significant weight changes-  positive or negative  · Change in sleeping patterns- unable to sleep or sleeping all the time   · Unwillingness or inability to communicate  · Depression  · Unusual sadness, discouragement and loneliness  · Talk of wanting to die  · Neglect of personal appearance   · Rebelliousness- reckless behavior  · Withdrawal from people/activities they love  · Confusion- inability to concentrate     If you or a loved one observes any of these behaviors or has concerns about self-harm, here's what you can do:  · Talk about it- your feelings and reasons for harming yourself  · Remove any means that you might use to hurt yourself (examples:  pills, rope, extension cords, firearm)  · Get professional help from the community (Mental Health, Substance Abuse, psychological counseling)  · Do not be alone:Call your Safe Contact- someone whom you trust who will be there for you.  · Call your local CRISIS HOTLINE 362-2041 or 784-994-0963  · Call your local Children's Mobile Crisis Response Team [place] (071) 117-2127 or www.[employer]  · Call the toll free National Suicide Prevention Hotlines   · National Suicide Prevention Lifeline 562-495-KMUO (7793)  · National Hope Line Network 800-SUICIDE (707-6677)

## 2021-02-18 NOTE — PROGRESS NOTES
Cardiology Follow Up Progress Note    Date of Service  2/18/2021    Attending Physician  Chanell Vera D.O.    Chief Complaint/reason for EP consult:  Bradycardia/SSS.       HPI:  This is an 85 year old lady who was admitted on 02/09/21 with a UTI and JOSE C, found to be hyperkalemic and received hemodialysis. The patient had reported several days of weakness as well as a ground level fall prior to coming to the hospital, and she noted some weight loss over the last several months. PMH significant for atrial fibrillation (anticoagulated on Eliquis prior to admission), CHF, valvular heart disease, CAD s/p CABG 2004, pulmonary hypertension, CKD, DM2, PVD and hypertension.     The patient was in a fib at time of admission. While in the hospital, the patient was noted to be bradycardic with HR in the 40s, with occasional pauses lasting 2.1-3.5 seconds. As such, cardiology was consulted and the patient was recommended for a pacemaker.    Interim Events  MDT dual chamber PPM placed by Dr. Calle 2/17/21.  Monitored rhythm: currently .  No events overnight.  Reports no chest pain or dyspnea.  Mild PPM site tenderness.     MDT device interrogation reveals normal device function this AM.     Review of Systems  Review of Systems   Constitutional: Negative for chills, fatigue and fever.   HENT: Negative for nosebleeds, tinnitus and trouble swallowing.    Respiratory: Negative for cough, shortness of breath and wheezing.    Cardiovascular: Negative for chest pain, palpitations and leg swelling.   Gastrointestinal: Negative for abdominal pain and blood in stool.   Genitourinary: Negative for hematuria.   Musculoskeletal:        Mild PPM site tenderness.    Neurological: Negative for dizziness, syncope, speech difficulty, weakness, light-headedness and numbness.       Vital signs in last 24 hours  Temp:  [36.1 °C (97 °F)-36.5 °C (97.7 °F)] 36.3 °C (97.4 °F)  Pulse:  [59-71] 64  Resp:  [10-17] 16  BP: ()/(46-74) 140/64  SpO2:   [90 %-100 %] 90 %    Physical Exam  Physical Exam  Vitals and nursing note reviewed.   Constitutional:       Appearance: Normal appearance. She is underweight.   HENT:      Head: Normocephalic and atraumatic.      Nose: No congestion.   Eyes:      Conjunctiva/sclera: Conjunctivae normal.      Pupils: Pupils are equal, round, and reactive to light.   Cardiovascular:      Rate and Rhythm: Normal rate and regular rhythm.      Pulses: Normal pulses.      Heart sounds: Normal heart sounds. No murmur. No friction rub. No gallop.    Pulmonary:      Effort: Pulmonary effort is normal.      Breath sounds: Normal breath sounds. No wheezing, rhonchi or rales.      Comments: Coarse BS throughout   Chest:      Comments: Left upper chest PPM site is clean and dry; there is no evidence of hematoma or erythema.   Abdominal:      General: Bowel sounds are normal.      Tenderness: There is no abdominal tenderness.   Musculoskeletal:         General: Normal range of motion.      Cervical back: Normal range of motion.      Right lower leg: No edema.      Left lower leg: No edema.   Skin:     General: Skin is warm and dry.   Neurological:      Mental Status: She is alert and oriented to person, place, and time.      Gait: Gait normal.   Psychiatric:         Mood and Affect: Mood normal.         Behavior: Behavior normal.         Thought Content: Thought content normal.         Judgment: Judgment normal.         Lab Review  Lab Results   Component Value Date/Time    WBC 9.5 02/18/2021 05:04 AM    RBC 3.85 (L) 02/18/2021 05:04 AM    HEMOGLOBIN 12.0 02/18/2021 05:04 AM    HEMATOCRIT 37.0 02/18/2021 05:04 AM    MCV 96.1 02/18/2021 05:04 AM    MCH 31.2 02/18/2021 05:04 AM    MCHC 32.4 (L) 02/18/2021 05:04 AM    MPV 10.1 02/18/2021 05:04 AM      Lab Results   Component Value Date/Time    SODIUM 126 (L) 02/18/2021 05:04 AM    POTASSIUM 5.3 02/18/2021 05:04 AM    CHLORIDE 99 02/18/2021 05:04 AM    CO2 20 02/18/2021 05:04 AM    GLUCOSE 233 (H)  02/18/2021 05:04 AM    BUN 29 (H) 02/18/2021 05:04 AM    CREATININE 0.78 02/18/2021 05:04 AM      Lab Results   Component Value Date/Time    ASTSGOT 22 02/09/2021 03:20 PM    ALTSGPT 26 02/09/2021 03:20 PM     Lab Results   Component Value Date/Time    CHOLSTRLTOT 101 06/11/2019 01:28 PM    LDL 49 06/11/2019 01:28 PM    HDL 39 (A) 06/11/2019 01:28 PM    TRIGLYCERIDE 66 06/11/2019 01:28 PM    TROPONINT 81 (H) 02/09/2021 03:20 PM       No results for input(s): NTPROBNP in the last 72 hours.    Cardiac Imaging and Procedures Review  Echocardiogram:  2/11/21  CONCLUSIONS  Fair quality study, improved with contrast.  Mild to moderately reduced left ventricular systolic function.  Left ventricular ejection fraction is visually estimated to be 40-45%.  Hypokinesis of the mid anterior septum and apex.  No LV thrombus.  Grade II diastolic dysfunction.  Moderately dilated left atrium.  Estimated right ventricular systolic pressure is 45 mmHg.     Compared to prior echo on 11/10/20, LV EF is improved, aortic stenosis   not appreciated, improved RVSP, less MR and TR, wall motion   abnormalities were probably present.      Imaging  Chest X-Ray:  2/18/21   FINDINGS:  Position of medical devices appears stable. Cardiomegaly is observed.  Postsurgical changes of sternotomy are noted.  The mediastinal contour appears within normal limits.  Hazy pulmonary opacity is seen.  The lungs appear well expanded bilaterally.  Opacity of the right lung base is noted.  Veil-like opacities are seen in the right lung base.  The bony structures appear age-appropriate.    Assessment/Plan:  1. SSS:  - S/P MDT dual chamber PPM placed by Dr. Calle 2/17/21.    - Device is working normally on interrogation this AM.  Wound site is clean.  CXR this AM without evidence of late PTX; lead position appears stable.    - I have discussed post PPM instructions with her and she does verbalize understanding.  - Dispo: She is being transferred to [facility] today.  OK  from EP standpoint.  I have placed PPM DC instructions, discussed with RN to print and place in transfer packet and give copy to pt.     2   Atrial fibrillation  -   Chronic  -   Ok to continue Eliquis and Coreg     3   HFrEF, LVEF 40 - 45%  -   Patient is on beta blocker, ACE-I, spironolactone, and torsemide at home  -   Has follow up appointment with HF clinic    EP will sign off.  Follow up is arranged in device clinic. Please call with any questions.      MARIIA Avitia.   [facility]  [phone]

## 2021-02-18 NOTE — DISCHARGE PLANNING
Anticipated Discharge Disposition: [facility] for SNF     Action: Pt has transport set up for 12 pm with [facility] transport on 2 liters of oxygen.     Left VM for pts son to notify about transport time. [facility] packet signed by MD and placed in pt's chart. Notified MD and RN.     Barriers to Discharge: na     Plan: LSW to assist as needed and monitor for barriers to discharge.

## 2021-02-18 NOTE — PROGRESS NOTES
Patient discharged AOx4, PIV removed, tele monitor removed, armband removed. Discharge summary thoroughly reviewed with patient. Patient acknowledged that all questions had been addressed and verbalized understanding of provided teaching. All belongings discharged with patient, patient wearing dentures at discharge. Patient discharged via gurney with [facility].

## 2021-02-18 NOTE — PROGRESS NOTES
Bedside report received from nurse. Assumed care of pt. Pt resting comfortably in bed. A/Ox4, VSS,  All needs met. POC reviewed and white board updated. Tele box on. Call light in reach. Bed locked in lowest position with 2 upper bed rails up. No pain or complaints. Pt set to discharge to [facility] with [facility] at 1100.

## 2021-02-18 NOTE — PROGRESS NOTES
Monitor summary:  Prior to pacer placement:  Afib 48-72  Post pacer placement:  100% paced 60-62  (f)PVC, coup, trig  1-3.5 sp  6 rate dependent BBB

## 2021-02-18 NOTE — DISCHARGE SUMMARY
Discharge Summary    CHIEF COMPLAINT ON ADMISSION  Chief Complaint   Patient presents with   • Weakness     Pt is complaining of increased weakness over the last three days. Pt states she has lost 10lbs over the last week. Pt usually ambulates on her own with a steady gait. Pt lives by herself and since becoming weak, patient has been having episodes of incontinence.    • GLF     Pt fell onto her knees today and used her med alert button. EMS found pt on her knees. Pt denies hitting her head. Pt taking eliquis.        Reason for Admission  EMS     CODE STATUS  Full Code    HPI & HOSPITAL COURSE  This is a 85 y.o. female here with weakness and acute renal failure.     85 y.o. female with CHF, CAD w/ CABG, HTN, PVD, DMII, afib admitted 2/9/2021 with acute renal failure and initial K:6.6 with Cr:3.67. She was hypotensive in the ER and admitted to the ICU with septic shock from UTI.  Patient was treated for her hyperkalemia with calcium chloride, insulin, and D50.  Patient's home blood pressure medication including torsemide and spironolactone were held.  Patient was slowly fluid resuscitated due to her chronic systolic heart failure.  Patient's hypotension improved and she was downgraded out of ICU.  Echocardiogram was done that showed EF 40 to 45% and grade 2 diastolic dysfunction with RVSP 45 mmHg. Patient has since finished antibiotics for UTI with no further symptoms. She was restarted on lisinopril and amlodipine for blood pressure, diuretics were held as patient euvoluemic on exam. Telemetry monitoring noted runs of aflutter converting to sinus bradycardia in the 30's, as well as a 3.5 sec pause. Cardiology was consulted and pacemaker was placed on 2/17. Pacemaker was interrogated and is working appropriately. Amlodipine stopped and restarted on coreg for HF at lowest dose, will likely need to be titrated back up. Patient was evaluated by PT/OT who recommended postacute placement. Patient's vital signs are stable  and she is ready for discharge to SNF.     Therefore, she is discharged in fair and stable condition to skilled nursing facility.    The patient met 2-midnight criteria for an inpatient stay at the time of discharge.      FOLLOW UP ITEMS POST DISCHARGE  Follow up with primary care physician for further blood pressure management.  Follow up with cardiology post pacemaker placement.      DISCHARGE DIAGNOSES  Principal Problem:    Acute renal failure superimposed on stage 3 chronic kidney disease (HCC) POA: Yes  Active Problems:    Chronic combined systolic and diastolic heart failure ([facility]) POA: Yes    Bradycardia POA: Unknown    Essential hypertension POA: Yes    DM type 2 (diabetes mellitus, type 2) ([facility]) POA: Yes    Chronic atrial fibrillation ([facility]) POA: Yes    Pulmonary hypertension ([facility]) POA: Yes    Nonrheumatic aortic valve stenosis POA: Unknown    CAD (coronary artery disease) POA: Yes  Resolved Problems:    Severe sepsis with septic shock ([facility]) POA: Unknown    Hyperkalemia POA: Unknown    Acute cystitis without hematuria POA: Yes      Overview: Diagnois update 10/1/2016    Syncope POA: Yes    Hypercalcemia POA: Unknown      FOLLOW UP  Future Appointments   Date Time Provider Department Center   3/8/2021 11:00 AM BETHEL Lamar [facility] None     Rich Coffey M.D.  [address]  [address]  [facility] [address]  [phone]      Follow up with primary care     Neida Calle M.D.  [address]  [address]  [facility] [address]  [phone]    Schedule an appointment as soon as possible for a visit  Follow up with cardiology after pacemaker placement.     [address] [address]  [phone]          MEDICATIONS ON DISCHARGE     Medication List      START taking these medications      Instructions   acetaminophen 325 MG Tabs  Commonly known as: Tylenol   Take 2 Tablets by mouth every 6 hours as needed (Mild Pain; (Pain scale 1-3); Temp greater than 100.5  F).  Dose: 650 mg        CHANGE how you take these medications      Instructions   carvedilol 3.125 MG Tabs  What changed:   · medication strength  · how much to take  Commonly known as: COREG   Take 1 tablet by mouth 2 times a day, with meals.  Dose: 3.125 mg        CONTINUE taking these medications      Instructions   apixaban 2.5mg Tabs  Commonly known as: ELIQUIS   Take 1 Tab by mouth 2 Times a Day.  Dose: 2.5 mg     atorvastatin 80 MG tablet  Commonly known as: LIPITOR   Take 1 Tab by mouth every evening.  Dose: 80 mg     lisinopril 20 MG Tabs  Commonly known as: PRINIVIL   Take 1 Tab by mouth every day.  Dose: 20 mg        STOP taking these medications    spironolactone 25 MG Tabs  Commonly known as: ALDACTONE     torsemide 100 MG Tabs  Commonly known as: DEMADEX            Allergies  Allergies   Allergen Reactions   • Tetracaine Hcl [Tetcaine]    • Vancomycin    • Vigamox [Moxifloxacin]        DIET  Orders Placed This Encounter   Procedures   • Diet Order Diet: Cardiac     Standing Status:   Standing     Number of Occurrences:   1     Order Specific Question:   Diet:     Answer:   Cardiac [6]       ACTIVITY  As tolerated.  Weight bearing as tolerated    LINES, DRAINS, AND WOUNDS  This is an automated list. Peripheral IVs will be removed prior to discharge.  Peripheral IV 02/15/21 22 G Anterior;Left Forearm (Active)   Site Assessment Clean;Dry;Intact 02/18/21 0800   Dressing Type Occlusive;Transparent 02/18/21 0800   Line Status Flushed;Scrubbed the hub prior to access;Saline locked 02/18/21 0800   Dressing Status Old drainage;Dry;Intact 02/18/21 0800   Dressing Intervention N/A 02/18/21 0800   [place]fil[place] Grading [place] 0 02/18/21 0800   Phlebitis Scale ([facility] Only) 0 02/18/21 0800       Peripheral IV 02/17/21 20 G Left Antecubital (Active)   Site Assessment Clean;Dry;Intact 02/18/21 0800   Dressing Type Transparent 02/18/21 0800   Line Status Saline locked 02/18/21 0800   Dressing Status  Clean;Dry;Intact 02/17/21 2000   Dressing Intervention N/A 02/17/21 2000   Infiltration Grading ([facility] 0 02/17/21 2000   Phlebitis Scale ([facility]) 0 02/17/21 2000     External Urinary Catheter (Female Wick) (Active)   Collection Container Suction container 02/17/21 1300   Suction Level (Female Wick Catheter) 60 mmHg 02/17/21 1300   Output (mL) 1000 mL 02/17/21 1300      Moisture Associated Skin Damage 02/10/21 Midline Sacrum (Active)   Drainage Amount None 02/15/21 1900   Periwound Assessment Pink 02/15/21 1900   IAD Cleansing Soap and Water 02/17/21 0900   Periwound Protectant Barrier Paste 02/17/21 0900       Wound 02/10/21 Pressure Injury Sacrum POA DTI with surrounding MASD (Active)   Wound Image   02/18/21 0832   Site Assessment Pink;Red 02/17/21 2000   Periwound Assessment Red 02/17/21 2000   Margins Attached edges 02/17/21 2000   Closure Open to air 02/17/21 2000   Drainage Amount None 02/17/21 2000   Treatments Site care 02/17/21 2000   Wound Cleansing Soap and Water 02/17/21 2000   Periwound Protectant Barrier Paste 02/17/21 2000   Dressing Cleansing/Solutions Not Applicable 02/17/21 2000   Dressing Options Open to Air 02/17/21 2000   Dressing Changed Observed 02/17/21 2000   Dressing Status Open to Air 02/17/21 2000   Dressing Change/Treatment Frequency Every Shift, and As Needed 02/17/21 2000   NEXT Weekly Photo (Inpatient Only) 02/17/21 02/16/21 0800   Pressure Injury Stage DTPI 02/10/21 1000   Wound Length (cm) 1.3 cm 02/10/21 1000   Wound Width (cm) 0.3 cm 02/10/21 1000   Wound Surface Area (cm^2) 0.39 cm^2 02/10/21 1000       Peripheral IV 02/15/21 22 G Anterior;Left Forearm (Active)   Site Assessment Clean;Dry;Intact 02/18/21 0800   Dressing Type Occlusive;Transparent 02/18/21 0800   Line Status Flushed;Scrubbed the hub prior to access;Saline locked 02/18/21 0800   Dressing Status Old drainage;Dry;Intact 02/18/21 0800   Dressing Intervention N/A 02/18/21 0800   [place]fil[place] Grading  [place] 02/18/21 0800   Phlebitis Scale ([facility] Only) 0 02/18/21 0800       Peripheral IV 02/17/21 20 G Left Antecubital (Active)   Site Assessment Clean;Dry;Intact 02/18/21 0800   Dressing Type Transparent 02/18/21 0800   Line Status Saline locked 02/18/21 0800   Dressing Status Clean;Dry;Intact 02/17/21 2000   Dressing Intervention N/A 02/17/21 2000   Infiltration Grading ([facility]) 0 02/17/21 2000   Phlebitis Scale ([facility] Only) 0 02/17/21 2000               MENTAL STATUS ON TRANSFER  Level of Consciousness: Alert  Orientation : Oriented x 4  Speech: Speech Clear    CONSULTATIONS  Cardiology  Critical Care    PROCEDURES  Central line placement  Permanent Pacemaker Implantation    LABORATORY  Lab Results   Component Value Date    SODIUM 126 (L) 02/18/2021    POTASSIUM 5.3 02/18/2021    CHLORIDE 99 02/18/2021    CO2 20 02/18/2021    GLUCOSE 233 (H) 02/18/2021    BUN 29 (H) 02/18/2021    CREATININE 0.78 02/18/2021        Lab Results   Component Value Date    WBC 9.5 02/18/2021    HEMOGLOBIN 12.0 02/18/2021    HEMATOCRIT 37.0 02/18/2021    PLATELETCT 272 02/18/2021        Total time of the discharge process exceeds 35 minutes.

## 2021-02-18 NOTE — CARE PLAN
Problem: Venous Thromboembolism (VTW)/Deep Vein Thrombosis (DVT) Prevention:  Goal: Patient will participate in Venous Thrombosis (VTE)/Deep Vein Thrombosis (DVT)Prevention Measures  Outcome: PROGRESSING AS EXPECTED     Problem: Bowel/Gastric:  Goal: Will not experience complications related to bowel motility  Outcome: PROGRESSING AS EXPECTED     Problem: Discharge Barriers/Planning  Goal: Patient's continuum of care needs will be met  Outcome: PROGRESSING AS EXPECTED     Problem: Skin Integrity  Goal: Risk for impaired skin integrity will decrease  Outcome: PROGRESSING AS EXPECTED     Problem: Pain Management  Goal: Pain level will decrease to patient's comfort goal  Outcome: PROGRESSING AS EXPECTED     Problem: Safety  Goal: Will remain free from injury  Outcome: PROGRESSING AS EXPECTED  Goal: Will remain free from falls  Outcome: PROGRESSING AS EXPECTED

## 2021-02-18 NOTE — CARE PLAN
Problem: Venous Thromboembolism (VTW)/Deep Vein Thrombosis (DVT) Prevention:  Goal: Patient will participate in Venous Thrombosis (VTE)/Deep Vein Thrombosis (DVT)Prevention Measures  Outcome: PROGRESSING AS EXPECTED     Problem: Knowledge Deficit  Goal: Knowledge of the prescribed therapeutic regimen will improve  Outcome: PROGRESSING AS EXPECTED     Problem: Skin Integrity  Goal: Risk for impaired skin integrity will decrease  Outcome: PROGRESSING AS EXPECTED     Problem: Safety  Goal: Will remain free from injury  Outcome: PROGRESSING AS EXPECTED  Goal: Will remain free from falls  Outcome: PROGRESSING AS EXPECTED

## 2021-07-07 ENCOUNTER — PATIENT OUTREACH (OUTPATIENT)
Dept: HEALTH INFORMATION MANAGEMENT | Facility: OTHER | Age: 86
End: 2021-07-07